# Patient Record
Sex: MALE | Race: WHITE | Employment: FULL TIME | URBAN - NONMETROPOLITAN AREA
[De-identification: names, ages, dates, MRNs, and addresses within clinical notes are randomized per-mention and may not be internally consistent; named-entity substitution may affect disease eponyms.]

---

## 2017-07-20 ENCOUNTER — HOSPITAL ENCOUNTER (EMERGENCY)
Age: 32
Discharge: HOME | End: 2017-07-20
Attending: EMERGENCY MEDICINE

## 2017-07-20 VITALS
HEART RATE: 77 BPM | SYSTOLIC BLOOD PRESSURE: 99 MMHG | TEMPERATURE: 98.1 F | RESPIRATION RATE: 20 BRPM | DIASTOLIC BLOOD PRESSURE: 83 MMHG | OXYGEN SATURATION: 98 %

## 2017-07-20 DIAGNOSIS — K52.9 GASTROENTERITIS: ICD-10-CM

## 2017-07-20 DIAGNOSIS — R11.11 VOMITING WITHOUT NAUSEA, INTRACTABILITY OF VOMITING NOT SPECIFIED, UNSPECIFIED VOMITING TYPE: Primary | ICD-10-CM

## 2017-07-20 DIAGNOSIS — R19.7 DIARRHEA, UNSPECIFIED TYPE: ICD-10-CM

## 2017-07-20 PROCEDURE — 6360000002 HC RX W HCPCS: Performed by: EMERGENCY MEDICINE

## 2017-07-20 PROCEDURE — 99283 EMERGENCY DEPT VISIT LOW MDM: CPT

## 2017-07-20 RX ORDER — ONDANSETRON 4 MG/1
4 TABLET, ORALLY DISINTEGRATING ORAL ONCE
Status: COMPLETED | OUTPATIENT
Start: 2017-07-20 | End: 2017-07-20

## 2017-07-20 RX ORDER — ONDANSETRON 4 MG/1
4 TABLET, ORALLY DISINTEGRATING ORAL EVERY 8 HOURS PRN
Qty: 4 TABLET | Refills: 0 | Status: SHIPPED | OUTPATIENT
Start: 2017-07-20 | End: 2017-12-30 | Stop reason: ALTCHOICE

## 2017-07-20 RX ADMIN — ONDANSETRON 4 MG: 4 TABLET, ORALLY DISINTEGRATING ORAL at 08:42

## 2017-12-30 ENCOUNTER — HOSPITAL ENCOUNTER (EMERGENCY)
Age: 32
Discharge: HOME | End: 2017-12-30
Attending: FAMILY MEDICINE

## 2017-12-30 VITALS
SYSTOLIC BLOOD PRESSURE: 132 MMHG | OXYGEN SATURATION: 98 % | HEART RATE: 79 BPM | TEMPERATURE: 98.8 F | DIASTOLIC BLOOD PRESSURE: 93 MMHG | WEIGHT: 300 LBS | RESPIRATION RATE: 18 BRPM

## 2017-12-30 DIAGNOSIS — H66.002 ACUTE SUPPURATIVE OTITIS MEDIA OF LEFT EAR WITHOUT SPONTANEOUS RUPTURE OF TYMPANIC MEMBRANE, RECURRENCE NOT SPECIFIED: Primary | ICD-10-CM

## 2017-12-30 PROCEDURE — 6370000000 HC RX 637 (ALT 250 FOR IP): Performed by: FAMILY MEDICINE

## 2017-12-30 PROCEDURE — 99282 EMERGENCY DEPT VISIT SF MDM: CPT

## 2017-12-30 RX ORDER — AMOXICILLIN AND CLAVULANATE POTASSIUM 875; 125 MG/1; MG/1
1 TABLET, FILM COATED ORAL ONCE
Status: COMPLETED | OUTPATIENT
Start: 2017-12-30 | End: 2017-12-30

## 2017-12-30 RX ORDER — AMOXICILLIN 500 MG/1
1000 CAPSULE ORAL 3 TIMES DAILY
Qty: 60 CAPSULE | Refills: 0 | Status: SHIPPED | OUTPATIENT
Start: 2017-12-30 | End: 2018-01-09

## 2017-12-30 RX ADMIN — AMOXICILLIN AND CLAVULANATE POTASSIUM 1 TABLET: 875; 125 TABLET, FILM COATED ORAL at 01:53

## 2017-12-30 ASSESSMENT — PAIN DESCRIPTION - ORIENTATION: ORIENTATION: LEFT

## 2017-12-30 ASSESSMENT — PAIN DESCRIPTION - ONSET: ONSET: AWAKENED FROM SLEEP

## 2017-12-30 ASSESSMENT — PAIN DESCRIPTION - DESCRIPTORS: DESCRIPTORS: THROBBING

## 2017-12-30 ASSESSMENT — PAIN DESCRIPTION - PROGRESSION: CLINICAL_PROGRESSION: NOT CHANGED

## 2017-12-30 ASSESSMENT — PAIN SCALES - GENERAL: PAINLEVEL_OUTOF10: 6

## 2017-12-30 ASSESSMENT — PAIN DESCRIPTION - LOCATION: LOCATION: EAR

## 2017-12-30 ASSESSMENT — PAIN DESCRIPTION - FREQUENCY: FREQUENCY: CONTINUOUS

## 2017-12-30 NOTE — ED PROVIDER NOTES
[address]  eMERGENCY dEPARTMENT eNCOUnter          [address] [facility]       Chief Complaint   Patient presents with   Jesi Cobos had a cold for about a week with sinus pain. Tonight around 10:30 I woke up from a nap with left ear pain and noticed blood. Felt dizzy now\"       Nurses Notes reviewed and I agree except as noted in the HPI. HISTORY OF PRESENT ILLNESS    Terrie Zabala is a 28 y.o. male who presents To emergency room with complaint of pain in his left ear, patient states he's had cold with some sinus discomfort over the past week, beginning dosing increasing left ear pain, did try to take a Q-tip and clears wax out me and noticing blood. Patient states he has had ear infections in both ears in the past.  Patient rates his pain 6 out of 10, throbbing continuous. REVIEW OF SYSTEMS     Review of Systems   All other systems reviewed and are negative. PAST MEDICAL HISTORY    has no past medical history on file. SURGICAL HISTORY      has a past surgical history that includes Umbilical hernia repair; Tympanostomy tube placement; and Tympanostomy tube placement. CURRENT MEDICATIONS       Previous Medications    No medications on file       ALLERGIES     has No Known Allergies. FAMILY HISTORY     has no family status information on file. family history is not on file. SOCIAL HISTORY      reports that he has been smoking Cigarettes. He has been smoking about 0.50 packs per day. He does not have any smokeless tobacco history on file. He reports that he does not drink alcohol. PHYSICAL EXAM     INITIAL VITALS:  weight is 300 lb (136.1 kg). His oral temperature is 98.8 °F (37.1 °C). His blood pressure is 132/93 (abnormal) and his pulse is 79. His respiration is 18 and oxygen saturation is 98%. Physical Exam   Constitutional: Patient is oriented to person, place, and time. Patient appears well-developed and well-nourished.  Patient is active and cooperative. HENT:   Head: Normocephalic and atraumatic. Head is without contusion. Right Ear: Hearing and external ear normal. No drainage. Normal TM   Left Ear: Hearing and external ear normal. No drainage. Noted opaque TM with bulging, noted gelled blood external to the tympanic membrane consistent with small scrape noted on the anterior aspect of the ear canal  Nose: Nose normal. No nasal deformity. No epistaxis. Mouth/Throat: Mucous membranes are not dry. Mild erythema without exudate  Eyes: EOMI. Conjunctivae, sclera, and lids are normal. Right eye exhibits no discharge. Left eye exhibits no discharge. Neck: Full passive range of motion without pain and phonation normal.   Cardiovascular:  Normal rate, regular rhythm and intact distal pulses. Pulses: Right radial pulse  2+   Pulmonary/Chest: Effort normal. No tachypnea and no bradypnea. No wheezes, rhonchi, or rales. Abdominal: Soft. Increased BMI   Musculoskeletal:   Negative acute trauma or deformity,  apparent full range of motion and normal strength all extremities appropriate to age. Neurological: Patient is alert and oriented to person, place, and time. patient displays no tremor. Patient displays no seizure activity. .  Lymphatic:  Mild anterior cervical lymphadenopathy  Skin: Skin is warm and dry. Patient is not diaphoretic. Psychiatric: Patient has a normal mood and affect.  Patient speech is normal and behavior is normal. Cognition and memory are normal.      DIFFERENTIAL DIAGNOSIS:   OM, Effusion, ruptured eardrum    DIAGNOSTIC RESULTS           RADIOLOGY: non-plain film images(s) such as CT, Ultrasound and MRI are read by the radiologist.  No orders to display       LABS:   Labs Reviewed - No data to display    EMERGENCY DEPARTMENT COURSE:   Vitals:    Vitals:    12/30/17 0116   BP: (!) 132/93   Pulse: 79   Resp: 18   Temp: 98.8 °F (37.1 °C)   TempSrc: Oral   SpO2: 98%   Weight: 300 lb (136.1 kg)     Patient history physical

## 2018-06-29 ENCOUNTER — APPOINTMENT (OUTPATIENT)
Dept: GENERAL RADIOLOGY | Age: 33
End: 2018-06-29

## 2018-06-29 ENCOUNTER — HOSPITAL ENCOUNTER (EMERGENCY)
Age: 33
Discharge: HOME | End: 2018-06-29
Attending: EMERGENCY MEDICINE

## 2018-06-29 VITALS
SYSTOLIC BLOOD PRESSURE: 122 MMHG | HEART RATE: 82 BPM | OXYGEN SATURATION: 97 % | RESPIRATION RATE: 26 BRPM | TEMPERATURE: 98.6 F | DIASTOLIC BLOOD PRESSURE: 65 MMHG

## 2018-06-29 DIAGNOSIS — M54.12 CERVICAL RADICULOPATHY: ICD-10-CM

## 2018-06-29 DIAGNOSIS — M54.2 NECK PAIN: Primary | ICD-10-CM

## 2018-06-29 LAB
ABSOLUTE BANDS #: 0.41 K/UL (ref 0–1)
ABSOLUTE EOS #: 0.14 K/UL (ref 0–0.4)
ABSOLUTE IMMATURE GRANULOCYTE: ABNORMAL K/UL (ref 0–0.3)
ABSOLUTE LYMPH #: 4.9 K/UL (ref 1–4.8)
ABSOLUTE MONO #: 0.95 K/UL (ref 0–1)
ANION GAP SERPL CALCULATED.3IONS-SCNC: 12 MMOL/L (ref 9–17)
ATYPICAL LYMPHOCYTE ABSOLUTE COUNT: 0.14 K/UL (ref 0–1)
ATYPICAL LYMPHOCYTES: 1 %
BANDS: 3 % (ref 0–10)
BASOPHILS # BLD: ABNORMAL % (ref 0–2)
BASOPHILS ABSOLUTE: ABNORMAL K/UL (ref 0–0.2)
BUN BLDV-MCNC: 20 MG/DL (ref 6–20)
BUN/CREAT BLD: 24 (ref 9–20)
CALCIUM SERPL-MCNC: 9.5 MG/DL (ref 8.6–10.4)
CHLORIDE BLD-SCNC: 100 MMOL/L (ref 98–107)
CO2: 27 MMOL/L (ref 20–31)
CREAT SERPL-MCNC: 0.85 MG/DL (ref 0.7–1.2)
DIFFERENTIAL TYPE: ABNORMAL
EKG ATRIAL RATE: 77 BPM
EKG P AXIS: 18 DEGREES
EKG P-R INTERVAL: 146 MS
EKG Q-T INTERVAL: 388 MS
EKG QRS DURATION: 80 MS
EKG QTC CALCULATION (BAZETT): 439 MS
EKG R AXIS: -9 DEGREES
EKG T AXIS: 36 DEGREES
EKG VENTRICULAR RATE: 77 BPM
EOSINOPHILS RELATIVE PERCENT: 1 % (ref 0–5)
GFR AFRICAN AMERICAN: >60 ML/MIN
GFR NON-AFRICAN AMERICAN: >60 ML/MIN
GFR SERPL CREATININE-BSD FRML MDRD: ABNORMAL ML/MIN/{1.73_M2}
GFR SERPL CREATININE-BSD FRML MDRD: ABNORMAL ML/MIN/{1.73_M2}
GLUCOSE BLD-MCNC: 148 MG/DL (ref 70–99)
HCT VFR BLD CALC: 43.6 % (ref 41–53)
HEMOGLOBIN: 15.1 G/DL (ref 13.5–17.5)
IMMATURE GRANULOCYTES: ABNORMAL %
LYMPHOCYTES # BLD: 36 % (ref 13–44)
MCH RBC QN AUTO: 32 PG (ref 26–34)
MCHC RBC AUTO-ENTMCNC: 34.7 G/DL (ref 31–37)
MCV RBC AUTO: 92.1 FL (ref 80–100)
MONOCYTES # BLD: 7 % (ref 5–9)
MORPHOLOGY: ABNORMAL
NRBC AUTOMATED: ABNORMAL PER 100 WBC
PDW BLD-RTO: 13.7 % (ref 12.1–15.2)
PLATELET # BLD: 209 K/UL (ref 140–450)
PLATELET ESTIMATE: ABNORMAL
PMV BLD AUTO: ABNORMAL FL (ref 6–12)
POTASSIUM SERPL-SCNC: 4 MMOL/L (ref 3.7–5.3)
RBC # BLD: 4.73 M/UL (ref 4.5–5.9)
RBC # BLD: ABNORMAL 10*6/UL
SEG NEUTROPHILS: 52 % (ref 39–75)
SEGMENTED NEUTROPHILS ABSOLUTE COUNT: 7.06 K/UL (ref 2.1–6.5)
SODIUM BLD-SCNC: 139 MMOL/L (ref 135–144)
TROPONIN INTERP: NORMAL
TROPONIN T: <0.03 NG/ML
WBC # BLD: 13.6 K/UL (ref 3.5–11)
WBC # BLD: ABNORMAL 10*3/UL

## 2018-06-29 PROCEDURE — 93005 ELECTROCARDIOGRAM TRACING: CPT

## 2018-06-29 PROCEDURE — 99283 EMERGENCY DEPT VISIT LOW MDM: CPT

## 2018-06-29 PROCEDURE — 85025 COMPLETE CBC W/AUTO DIFF WBC: CPT

## 2018-06-29 PROCEDURE — 96374 THER/PROPH/DIAG INJ IV PUSH: CPT

## 2018-06-29 PROCEDURE — 36415 COLL VENOUS BLD VENIPUNCTURE: CPT

## 2018-06-29 PROCEDURE — 71046 X-RAY EXAM CHEST 2 VIEWS: CPT

## 2018-06-29 PROCEDURE — 80048 BASIC METABOLIC PNL TOTAL CA: CPT

## 2018-06-29 PROCEDURE — 6360000002 HC RX W HCPCS: Performed by: EMERGENCY MEDICINE

## 2018-06-29 PROCEDURE — 84484 ASSAY OF TROPONIN QUANT: CPT

## 2018-06-29 RX ORDER — KETOROLAC TROMETHAMINE 30 MG/ML
15 INJECTION, SOLUTION INTRAMUSCULAR; INTRAVENOUS ONCE
Status: COMPLETED | OUTPATIENT
Start: 2018-06-29 | End: 2018-06-29

## 2018-06-29 RX ADMIN — KETOROLAC TROMETHAMINE 15 MG: 30 INJECTION, SOLUTION INTRAMUSCULAR at 00:55

## 2018-06-29 ASSESSMENT — ENCOUNTER SYMPTOMS
SHORTNESS OF BREATH: 0
EYE REDNESS: 0
DIARRHEA: 0
SORE THROAT: 0
ABDOMINAL PAIN: 0
TROUBLE SWALLOWING: 0
BACK PAIN: 0
NAUSEA: 0
EYE PAIN: 0
COUGH: 0
COLOR CHANGE: 0
VOMITING: 0

## 2018-06-29 ASSESSMENT — PAIN SCALES - GENERAL
PAINLEVEL_OUTOF10: 4
PAINLEVEL_OUTOF10: 4

## 2018-06-29 ASSESSMENT — PAIN DESCRIPTION - PAIN TYPE: TYPE: ACUTE PAIN

## 2018-06-29 ASSESSMENT — PAIN DESCRIPTION - ORIENTATION: ORIENTATION: RIGHT;LEFT

## 2018-06-29 ASSESSMENT — PAIN DESCRIPTION - FREQUENCY: FREQUENCY: CONTINUOUS

## 2018-06-29 ASSESSMENT — PAIN DESCRIPTION - DESCRIPTORS: DESCRIPTORS: SHARP;SHOOTING

## 2018-06-29 ASSESSMENT — PAIN DESCRIPTION - LOCATION: LOCATION: ARM

## 2018-06-29 NOTE — ED PROVIDER NOTES
[facility] ED  eMERGENCY dEPARTMENT eNCOUnter      Pt Name: Nickolas Chavira  MRN: [id number]  [place] 1985  Date of evaluation: 6/29/2018  Provider: [place] MD    CHIEF COMPLAINT       Chief Complaint   Patient presents with    Arm Pain     pt c/o bilateral arm pain with numbness, lightheaded and dizziness     Patient is a 77-year-old male presents to the emergency department complaining of bilateral arm pain. He states he developed pain across his back and neck with numbness and pain radiating down his arms. He denies any chest pain. He denies any shortness of breath. He denies any nausea or vomiting. He states that the symptoms were bad for 30 minutes and have gotten better but are still present. He states it hurts when he moves his arms. He denies any numbness or tingling. He denies any history of any medical problems and denies any family history of medical problems. Nursing Notes were reviewed. REVIEW OF SYSTEMS    (2-9 systems for level 4, 10 or more for level 5)     Review of Systems   Constitutional: Negative for chills and fever. HENT: Negative for ear pain, sore throat and trouble swallowing. Eyes: Negative for pain and redness. Respiratory: Negative for cough and shortness of breath. Cardiovascular: Negative for chest pain and palpitations. Gastrointestinal: Negative for abdominal pain, diarrhea, nausea and vomiting. Genitourinary: Negative for dysuria and frequency. Musculoskeletal: Negative for back pain and neck pain. Skin: Negative for color change and rash. Neurological: Positive for weakness and numbness. Negative for dizziness, syncope and headaches. Psychiatric/Behavioral: Negative for hallucinations and suicidal ideas. Except as noted above the remainder of the review of systems was reviewed and negative. PAST MEDICAL HISTORY   History reviewed. No pertinent past medical history.       SURGICAL HISTORY       Past Surgical History:   Procedure Laterality Date    TYMPANOSTOMY TUBE PLACEMENT      TYMPANOSTOMY TUBE PLACEMENT      UMBILICAL HERNIA REPAIR           ALLERGIES     Patient has no known allergies. FAMILY HISTORY     History reviewed. No pertinent family history. SOCIAL HISTORY       Social History     Social History    Marital status:      Spouse name: N/A    Number of children: N/A    Years of education: N/A     Social History Main Topics    Smoking status: Current Every Day Smoker     Packs/day: 0.50     Types: Cigarettes    Smokeless tobacco: Never Used    Alcohol use No    Drug use: Unknown    Sexual activity: Not Asked     Other Topics Concern    None     Social History Narrative    None           PHYSICAL EXAM    (up to 7 for level 4, 8 or more for level 5)     ED Triage Vitals [06/29/18 0028]   BP Temp Temp Source Pulse Resp SpO2 Height Weight   (!) 144/86 98.6 °F (37 °C) Oral 79 12 99 % -- --       Physical Exam     Physical    Vital signs and nursing notes were reviewed as well as the social, family, and past medical history. Gen. appearance: Patient is alert and oriented and in no acute distress    Head: Atraumatic, normocephalic    Neck: Supple, trachea/thyroid normal    EENT: PERRLA, EOMI, conjunctiva normal.    Skin: Warm and dry with no rash    Cardiovascular: Heart RRR, no gallops or rubs, no aortic enlargement or bruits noted. Respiratory: Lungs clear, no wheezing, no rales, normal breath sounds. Gastrointestinal: Abdomen nontender, bowel sounds normal, no rebound/guarding/distention or mass    Musculoskeletal: No tenderness in the extremities, no back or hip pain.     Neurological: Patient is alert and oriented ×3, no focal motor or sensory deficits noted, reflexes normal, NIH = 0      DIAGNOSTIC RESULTS     EKG: All EKG's are interpreted by the Emergency Department Physician who either signs or Co-signs this chart in the absence of a cardiologist.    EKG: normal EKG,

## 2018-06-29 NOTE — LETTER
[facility] ED  [address] [address]  Phone: [phone]               June 29, 2018    Patient: Kojo Jesus   YOB: 1985   Date of Visit: 6/29/2018       To Whom It May Concern:    Kojo Jesus was seen and treated in our emergency department on 6/29/2018.  He may return to work on 6/30/18      Sincerely,       Varsha Grissom RN         Signature:__________________________________

## 2018-11-12 ENCOUNTER — OFFICE VISIT (OUTPATIENT)
Dept: PRIMARY CARE CLINIC | Age: 33
End: 2018-11-12
Payer: COMMERCIAL

## 2018-11-12 VITALS
HEART RATE: 66 BPM | TEMPERATURE: 98.4 F | OXYGEN SATURATION: 98 % | WEIGHT: 296 LBS | SYSTOLIC BLOOD PRESSURE: 125 MMHG | DIASTOLIC BLOOD PRESSURE: 86 MMHG

## 2018-11-12 DIAGNOSIS — J02.0 STREP PHARYNGITIS: Primary | ICD-10-CM

## 2018-11-12 DIAGNOSIS — J02.9 SORE THROAT: ICD-10-CM

## 2018-11-12 LAB — S PYO AG THROAT QL: POSITIVE

## 2018-11-12 PROCEDURE — 99213 OFFICE O/P EST LOW 20 MIN: CPT | Performed by: NURSE PRACTITIONER

## 2018-11-12 PROCEDURE — 87880 STREP A ASSAY W/OPTIC: CPT | Performed by: NURSE PRACTITIONER

## 2018-11-12 RX ORDER — VARENICLINE TARTRATE
KIT
Refills: 0 | COMMUNITY
Start: 2018-10-26 | End: 2019-04-13 | Stop reason: ALTCHOICE

## 2018-11-12 RX ORDER — LURASIDONE HYDROCHLORIDE 20 MG/1
TABLET, FILM COATED ORAL
Refills: 0 | COMMUNITY
Start: 2018-10-25 | End: 2019-09-01

## 2018-11-12 RX ORDER — PENICILLIN V POTASSIUM 500 MG/1
500 TABLET ORAL 3 TIMES DAILY
Qty: 30 TABLET | Refills: 0 | Status: SHIPPED | OUTPATIENT
Start: 2018-11-12 | End: 2018-11-22

## 2018-11-12 ASSESSMENT — ENCOUNTER SYMPTOMS
NAUSEA: 1
VOMITING: 0
SHORTNESS OF BREATH: 0
COUGH: 1
ALLERGIC/IMMUNOLOGIC NEGATIVE: 1
EYES NEGATIVE: 1
SORE THROAT: 1
ABDOMINAL PAIN: 0
VOICE CHANGE: 1

## 2018-11-12 NOTE — PATIENT INSTRUCTIONS
SURVEY:    You may be receiving a survey from [employer] regarding your visit today. Please complete the survey to enable us to provide the highest quality of care to you and your family. If you cannot score us as very good on any question, please call the office to discuss how we could have made your experience exceptional.     Thank you. Patient Education        Sore Throat: Care Instructions  Your Care Instructions    Infection by bacteria or a virus causes most sore throats. Cigarette smoke, dry air, air pollution, allergies, and yelling can also cause a sore throat. Sore throats can be painful and annoying. Fortunately, most sore throats go away on their own. If you have a bacterial infection, your doctor may prescribe antibiotics. Follow-up care is a key part of your treatment and safety. Be sure to make and go to all appointments, and call your doctor if you are having problems. It's also a good idea to know your test results and keep a list of the medicines you take. How can you care for yourself at home? · If your doctor prescribed antibiotics, take them as directed. Do not stop taking them just because you feel better. You need to take the full course of antibiotics. · Gargle with warm salt water once an hour to help reduce swelling and relieve discomfort. Use 1 teaspoon of salt mixed in 1 cup of warm water. · Take an over-the-counter pain medicine, such as acetaminophen (Tylenol), ibuprofen (Advil, Motrin), or naproxen (Aleve). Read and follow all instructions on the label. · Be careful when taking over-the-counter cold or flu medicines and Tylenol at the same time. Many of these medicines have acetaminophen, which is Tylenol. Read the labels to make sure that you are not taking more than the recommended dose. Too much acetaminophen (Tylenol) can be harmful. · Drink plenty of fluids. Fluids may help soothe an irritated throat.  Hot fluids, such as tea or soup, may help decrease throat make and go to all appointments, and call your doctor if you are having problems. It's also a good idea to know your test results and keep a list of the medicines you take. How can you care for yourself at home? · Take your antibiotics as directed. Do not stop taking them just because you feel better. You need to take the full course of antibiotics. · Strep throat can spread to others until 24 hours after you begin taking antibiotics. During this time, you should avoid contact with other people at work or home, especially infants and children. Do not sneeze or cough on others, and wash your hands often. Keep your drinking glass and eating utensils separate from those of others, and wash these items well in hot, soapy water. · Gargle with warm salt water at least once each hour to help reduce swelling and make your throat feel better. Use 1 teaspoon of salt mixed in 8 fluid ounces of warm water. · Take an over-the-counter pain medication, such as acetaminophen (Tylenol), ibuprofen (Advil, Motrin), or naproxen (Aleve). Read and follow all instructions on the label. · Try an over-the-counter anesthetic throat spray or throat lozenges, which may help relieve throat pain. · Drink plenty of fluids. Fluids may help soothe an irritated throat. Hot fluids, such as tea or soup, may help your throat feel better. · Eat soft solids and drink plenty of clear liquids. Flavored ice pops, ice cream, scrambled eggs, sherbet, and gelatin dessert (such as Jell-O) may also soothe the throat. · Get lots of rest.  · Do not smoke, and avoid secondhand smoke. If you need help quitting, talk to your doctor about stop-smoking programs and medicines. These can increase your chances of quitting for good. · Use a vaporizer or humidifier to add moisture to the air in your bedroom. Follow the directions for cleaning the machine. When should you call for help?   Call your doctor now or seek immediate medical care if:    · You have a new or higher fever.     · You have a fever with a stiff neck or severe headache.     · You have new or worse trouble swallowing.     · Your sore throat gets much worse on one side.     · Your pain becomes much worse on one side of your throat.    Watch closely for changes in your health, and be sure to contact your doctor if:    · You are not getting better after 2 days (48 hours).     · You do not get better as expected. Where can you learn more? Go to https://[employer]. org and sign in to your [employer] account. Enter K625 in the "[employer]" box to learn more about \"Strep Throat: Care Instructions. \"     If you do not have an account, please click on the \"Sign Up Now\" link. Current as of: March 28, 2018  Content Version: 11.8  © 1965-1614 [employer]. Care instructions adapted under license by [facility] ([facility]). If you have questions about a medical condition or this instruction, always ask your healthcare professional. [address] any warranty or liability for your use of this information.

## 2018-11-12 NOTE — PROGRESS NOTES
[address] [facility] WALK-IN [facility] [address] [id number]  Dept: [phone]  Dept Fax: [phone]    Preethi Gauthier is a 35 y.o. male who presents to the [address] [place] [address] in Care today for his medical conditions/complaints as noted below. Preethi Gauthier is c/o of Pharyngitis (patient presents today for sore throat and BUK ear pain with fever since last night )      HPI:    Preethi Gauthier is a 35 y.o. male who presents with  Pharyngitis   This is a new problem. The current episode started yesterday (Late last night sore throat started. ). The problem occurs constantly. The problem has been unchanged. Associated symptoms include chills, coughing (at times ), diaphoresis, fatigue, a fever (102.9 temp this morning.), nausea, neck pain (Bilateral) and a sore throat. Pertinent negatives include no abdominal pain, congestion, headaches, rash or vomiting. The symptoms are aggravated by drinking, swallowing and eating. He has tried nothing for the symptoms. No past medical history on file. Current Outpatient Prescriptions   Medication Sig Dispense Refill    LATUDA 20 MG TABS tablet take 1 tablet by mouth daily  0    CHANTIX STARTING MONTH QUYEN 0.5 MG X 11 & 1 MG X 42 tablet   0    penicillin v potassium (VEETID) 500 MG tablet Take 1 tablet by mouth 3 times daily for 10 days 30 tablet 0     No current facility-administered medications for this visit. No Known Allergies    Subjective:      Review of Systems   Constitutional: Positive for chills, diaphoresis, fatigue and fever (102.9 temp this morning.). HENT: Positive for ear pain (Bilateral ear pain), sore throat and voice change (hoarse). Negative for congestion. Eyes: Negative. Respiratory: Positive for cough (at times ). Negative for shortness of breath. Cardiovascular: Negative. Gastrointestinal: Positive for nausea. Negative for abdominal pain and vomiting.    Endocrine: antibiotic  · Tylenol/Ibuprofen OTC PRN as directed on package for pain, discomfort or fever  · Encouraged to increase fluids and rest  · Avoid salty, spicy or acidic foods or beverages  · Soft diet until sore throat subsides  · Warm salt water gargles for sore throat  · Cepacol lozenges, chloraseptic spray OTC q 1-2 hrs PRN for sore throat  · Patient instructions given for strep pharyngitis  · To ER or call 911 if any difficulty breathing, shortness of breath, inability to swallow, hives, rash, facial/tongue swelling or temp greater than 103 degrees. · Follow up with PCP or Walk in Care as needed if symptoms worsen or do not improve. Return if symptoms worsen or fail to improve.     Orders Placed This Encounter   Medications    penicillin v potassium (VEETID) 500 MG tablet     Sig: Take 1 tablet by mouth 3 times daily for 10 days     Dispense:  30 tablet     Refill:  0        Electronically signed TOMMIE Calderon CNP on 11/12/2018 at 3:59 PM

## 2019-04-13 ENCOUNTER — OFFICE VISIT (OUTPATIENT)
Dept: PRIMARY CARE CLINIC | Age: 34
End: 2019-04-13
Payer: COMMERCIAL

## 2019-04-13 ENCOUNTER — HOSPITAL ENCOUNTER (OUTPATIENT)
Age: 34
Setting detail: SPECIMEN
Discharge: HOME | End: 2019-04-13
Payer: COMMERCIAL

## 2019-04-13 VITALS
OXYGEN SATURATION: 96 % | WEIGHT: 311 LBS | TEMPERATURE: 98.1 F | DIASTOLIC BLOOD PRESSURE: 80 MMHG | HEART RATE: 74 BPM | SYSTOLIC BLOOD PRESSURE: 126 MMHG

## 2019-04-13 DIAGNOSIS — J03.80 ACUTE BACTERIAL TONSILLITIS: ICD-10-CM

## 2019-04-13 DIAGNOSIS — J02.9 PHARYNGITIS, UNSPECIFIED ETIOLOGY: ICD-10-CM

## 2019-04-13 DIAGNOSIS — J01.00 ACUTE MAXILLARY SINUSITIS, RECURRENCE NOT SPECIFIED: ICD-10-CM

## 2019-04-13 DIAGNOSIS — R50.9 FEVER, UNSPECIFIED FEVER CAUSE: ICD-10-CM

## 2019-04-13 DIAGNOSIS — B96.89 ACUTE BACTERIAL TONSILLITIS: ICD-10-CM

## 2019-04-13 DIAGNOSIS — H66.002 ACUTE SUPPURATIVE OTITIS MEDIA OF LEFT EAR WITHOUT SPONTANEOUS RUPTURE OF TYMPANIC MEMBRANE, RECURRENCE NOT SPECIFIED: Primary | ICD-10-CM

## 2019-04-13 LAB
INFLUENZA A ANTIBODY: NORMAL
INFLUENZA B ANTIBODY: NORMAL
S PYO AG THROAT QL: NORMAL

## 2019-04-13 PROCEDURE — 87804 INFLUENZA ASSAY W/OPTIC: CPT | Performed by: NURSE PRACTITIONER

## 2019-04-13 PROCEDURE — 4004F PT TOBACCO SCREEN RCVD TLK: CPT | Performed by: NURSE PRACTITIONER

## 2019-04-13 PROCEDURE — G8421 BMI NOT CALCULATED: HCPCS | Performed by: NURSE PRACTITIONER

## 2019-04-13 PROCEDURE — G8427 DOCREV CUR MEDS BY ELIG CLIN: HCPCS | Performed by: NURSE PRACTITIONER

## 2019-04-13 PROCEDURE — 87651 STREP A DNA AMP PROBE: CPT

## 2019-04-13 PROCEDURE — 99214 OFFICE O/P EST MOD 30 MIN: CPT | Performed by: NURSE PRACTITIONER

## 2019-04-13 PROCEDURE — 87880 STREP A ASSAY W/OPTIC: CPT | Performed by: NURSE PRACTITIONER

## 2019-04-13 RX ORDER — AMOXICILLIN AND CLAVULANATE POTASSIUM 875; 125 MG/1; MG/1
1 TABLET, FILM COATED ORAL 2 TIMES DAILY
Qty: 20 TABLET | Refills: 0 | Status: SHIPPED | OUTPATIENT
Start: 2019-04-13 | End: 2019-04-23

## 2019-04-13 RX ORDER — BENZONATATE 100 MG/1
100-200 CAPSULE ORAL 3 TIMES DAILY PRN
Qty: 60 CAPSULE | Refills: 0 | Status: SHIPPED | OUTPATIENT
Start: 2019-04-13 | End: 2019-04-20

## 2019-04-13 ASSESSMENT — ENCOUNTER SYMPTOMS
NAUSEA: 0
SINUS PAIN: 1
VOMITING: 1
SORE THROAT: 1
SHORTNESS OF BREATH: 0
SINUS PRESSURE: 1
RHINORRHEA: 1
COUGH: 1
WHEEZING: 0
ALLERGIC/IMMUNOLOGIC NEGATIVE: 1
EYES NEGATIVE: 1

## 2019-04-13 NOTE — PATIENT INSTRUCTIONS
SURVEY:    You may be receiving a survey from [employer] regarding your visit today. Please complete the survey to enable us to provide the highest quality of care to you and your family. If you cannot score us a very good on any question, please call the office to discuss how we could have made your experience a very good one. Thank you. Patient Education        Ear Infection (Otitis Media): Care Instructions  Your Care Instructions    An ear infection may start with a cold and affect the middle ear (otitis media). It can hurt a lot. Most ear infections clear up on their own in a couple of days. Most often you will not need antibiotics. This is because many ear infections are caused by a virus. Antibiotics don't work against a virus. Regular doses of pain medicines are the best way to reduce your fever and help you feel better. Follow-up care is a key part of your treatment and safety. Be sure to make and go to all appointments, and call your doctor if you are having problems. It's also a good idea to know your test results and keep a list of the medicines you take. How can you care for yourself at home? · Take pain medicines exactly as directed. ? If the doctor gave you a prescription medicine for pain, take it as prescribed. ? If you are not taking a prescription pain medicine, take an over-the-counter medicine, such as acetaminophen (Tylenol), ibuprofen (Advil, Motrin), or naproxen (Aleve). Read and follow all instructions on the label. ? Do not take two or more pain medicines at the same time unless the doctor told you to. Many pain medicines have acetaminophen, which is Tylenol. Too much acetaminophen (Tylenol) can be harmful. · Plan to take a full dose of pain reliever before bedtime. Getting enough sleep will help you get better. · Try a warm, moist washcloth on the ear. It may help relieve pain. · If your doctor prescribed antibiotics, take them as directed.  Do not stop taking them just because you feel better. You need to take the full course of antibiotics. When should you call for help? Call your doctor now or seek immediate medical care if:    · You have new or increasing ear pain.     · You have new or increasing pus or blood draining from your ear.     · You have a fever with a stiff neck or a severe headache.    Watch closely for changes in your health, and be sure to contact your doctor if:    · You have new or worse symptoms.     · You are not getting better after taking an antibiotic for 2 days. Where can you learn more? Go to https://Powered Nowpepiceweb.DataCore Software. org and sign in to your WillCall account. Enter F445 in the TrendKite box to learn more about \"Ear Infection (Otitis Media): Care Instructions. \"     If you do not have an account, please click on the \"Sign Up Now\" link. Current as of: March 27, 2018  Content Version: 11.9  © 1756-9541 [employer] K[employer]. Care instructions adapted under license by [facility] ([facility]). If you have questions about a medical condition or this instruction, always ask your healthcare professional. [place] [address] any warranty or liability for your use of this information. Patient Education        Sore Throat: Care Instructions  Your Care Instructions    Infection by bacteria or a virus causes most sore throats. Cigarette smoke, dry air, air pollution, allergies, and yelling can also cause a sore throat. Sore throats can be painful and annoying. Fortunately, most sore throats go away on their own. If you have a bacterial infection, your doctor may prescribe antibiotics. Follow-up care is a key part of your treatment and safety. Be sure to make and go to all appointments, and call your doctor if you are having problems. It's also a good idea to know your test results and keep a list of the medicines you take. How can you care for yourself at home?   · If your doctor prescribed antibiotics, take them as directed. Do not stop taking them just because you feel better. You need to take the full course of antibiotics. · Gargle with warm salt water once an hour to help reduce swelling and relieve discomfort. Use 1 teaspoon of salt mixed in 1 cup of warm water. · Take an over-the-counter pain medicine, such as acetaminophen (Tylenol), ibuprofen (Advil, Motrin), or naproxen (Aleve). Read and follow all instructions on the label. · Be careful when taking over-the-counter cold or flu medicines and Tylenol at the same time. Many of these medicines have acetaminophen, which is Tylenol. Read the labels to make sure that you are not taking more than the recommended dose. Too much acetaminophen (Tylenol) can be harmful. · Drink plenty of fluids. Fluids may help soothe an irritated throat. Hot fluids, such as tea or soup, may help decrease throat pain. · Use over-the-counter throat lozenges to soothe pain. Regular cough drops or hard candy may also help. These should not be given to young children because of the risk of choking. · Do not smoke or allow others to smoke around you. If you need help quitting, talk to your doctor about stop-smoking programs and medicines. These can increase your chances of quitting for good. · Use a vaporizer or humidifier to add moisture to your bedroom. Follow the directions for cleaning the machine. When should you call for help? Call your doctor now or seek immediate medical care if:    · You have new or worse trouble swallowing.     · Your sore throat gets much worse on one side.    Watch closely for changes in your health, and be sure to contact your doctor if you do not get better as expected. Where can you learn more? Go to https://Pulsegema.Share Some Style. org and sign in to your Deligic account. Enter T124 in the BizGreet box to learn more about \"Sore Throat: Care Instructions. \"     If you do not have an account, please click on the \"Sign Up Now\" link.   Current as of: March 27, 2018  Content Version: 11.9  © 0360-2366 SafetyPay. Care instructions adapted under license by [facility] ([facility]). If you have questions about a medical condition or this instruction, always ask your healthcare professional. [address] any warranty or liability for your use of this information. Patient Education        Tonsillitis: Care Instructions  Your Care Instructions    Tonsillitis is an infection of the tonsils that is caused by bacteria or a virus. The tonsils are in the back of the throat and are part of the immune system. Tonsillitis typically lasts from a few days up to a couple of weeks. Tonsillitis caused by a virus goes away on its own. Tonsillitis caused by the bacteria that causes strep throat is treated with antibiotics. You and your doctor may consider surgery to remove the tonsils (tonsillectomy) if you have serious complications or repeat infections. Follow-up care is a key part of your treatment and safety. Be sure to make and go to all appointments, and call your doctor if you are having problems. It's also a good idea to know your test results and keep a list of the medicines you take. How can you care for yourself at home? · If your doctor prescribed antibiotics, take them as directed. Do not stop taking them just because you feel better. You need to take the full course of antibiotics. · Gargle with warm salt water. This helps reduce swelling and relieve discomfort. Gargle once an hour with 1 teaspoon of salt mixed in 8 fluid ounces of warm water. · Take an over-the-counter pain medicine, such as acetaminophen (Tylenol), ibuprofen (Advil, Motrin), or naproxen (Aleve). Be safe with medicines. Read and follow all instructions on the label. No one younger than 20 should take aspirin. It has been linked to Reye syndrome, a serious illness.   · Be careful when taking over-the-counter cold or flu medicines and Tylenol at the same time. Many of these medicines have acetaminophen, which is Tylenol. Read the labels to make sure that you are not taking more than the recommended dose. Too much acetaminophen (Tylenol) can be harmful. · Try an over-the-counter throat spray to relieve throat pain. · Drink plenty of fluids. Fluids may help soothe an irritated throat. Drink warm or cool liquids (whichever feels better). These include tea, soup, and juice. · Do not smoke, and avoid secondhand smoke. Smoking can make tonsillitis worse. If you need help quitting, talk to your doctor about stop-smoking programs and medicines. These can increase your chances of quitting for good. · Use a vaporizer or humidifier to add moisture to your bedroom. Follow the directions for cleaning the machine. When should you call for help? Call your doctor now or seek immediate medical care if:    · Your pain gets worse on one side of your throat.     · You have a new or higher fever.     · You notice changes in your voice.     · You have trouble opening your mouth.     · You have any trouble breathing.     · You have much more trouble swallowing.     · You have a fever with a stiff neck or a severe headache.     · You are sensitive to light or feel very sleepy or confused.    Watch closely for changes in your health, and be sure to contact your doctor if:    · You do not get better after 2 days. Where can you learn more? Go to https://GenometrycristelBluebox.Hypios. org and sign in to your Perceptis account. Enter E383 in the [facility] box to learn more about \"Tonsillitis: Care Instructions. \"     If you do not have an account, please click on the \"Sign Up Now\" link. Current as of: March 27, 2018  Content Version: 11.9  © 1457-2217 Mobileye, iovox. Care instructions adapted under license by [facility] ([facility]).  If you have questions about a medical condition or this instruction, always ask your healthcare professional. [place] [place] any warranty or liability for your use of this information. Patient Education        Sinusitis: Care Instructions  Your Care Instructions    Sinusitis is an infection of the lining of the sinus cavities in your head. Sinusitis often follows a cold. It causes pain and pressure in your head and face. In most cases, sinusitis gets better on its own in 1 to 2 weeks. But some mild symptoms may last for several weeks. Sometimes antibiotics are needed. Follow-up care is a key part of your treatment and safety. Be sure to make and go to all appointments, and call your doctor if you are having problems. It's also a good idea to know your test results and keep a list of the medicines you take. How can you care for yourself at home? · Take an over-the-counter pain medicine, such as acetaminophen (Tylenol), ibuprofen (Advil, Motrin), or naproxen (Aleve). Read and follow all instructions on the label. · If the doctor prescribed antibiotics, take them as directed. Do not stop taking them just because you feel better. You need to take the full course of antibiotics. · Be careful when taking over-the-counter cold or flu medicines and Tylenol at the same time. Many of these medicines have acetaminophen, which is Tylenol. Read the labels to make sure that you are not taking more than the recommended dose. Too much acetaminophen (Tylenol) can be harmful. · Breathe warm, moist air from a steamy shower, a hot bath, or a sink filled with hot water. Avoid cold, dry air. Using a humidifier in your home may help. Follow the directions for cleaning the machine. · Use saline (saltwater) nasal washes to help keep your nasal passages open and wash out mucus and bacteria. You can buy saline nose drops at a grocery store or drugstore. Or you can make your own at home by adding 1 teaspoon of salt and 1 teaspoon of baking soda to 2 cups of distilled water.  If you make your own, fill a bulb syringe with the solution, insert the tip

## 2019-04-13 NOTE — PROGRESS NOTES
Cardiovascular: Negative. Gastrointestinal: Positive for vomiting. Negative for nausea. Endocrine: Negative. Genitourinary: Negative. Musculoskeletal: Positive for myalgias (still has body aches). Skin: Negative. Allergic/Immunologic: Negative. Neurological: Positive for headaches. Hematological: Negative. Psychiatric/Behavioral: Negative. Objective:     Physical Exam   Constitutional: He is oriented to person, place, and time. He appears well-developed and well-nourished. HENT:   Head: Normocephalic and atraumatic. Right Ear: External ear normal.   Left Ear: External ear normal. No mastoid tenderness. Tympanic membrane is injected, erythematous and bulging. A middle ear effusion is present. Nose: Mucosal edema present. Right sinus exhibits frontal sinus tenderness. Left sinus exhibits frontal sinus tenderness. Mouth/Throat: Uvula is midline and mucous membranes are normal. Posterior oropharyngeal erythema present. Tonsils are 3+ on the right. Tonsils are 3+ on the left. No tonsillar exudate. Eyes: Pupils are equal, round, and reactive to light. EOM are normal.   Neck: Trachea normal and normal range of motion. Neck supple. Cardiovascular: Normal rate, regular rhythm and normal heart sounds. Pulmonary/Chest: Effort normal and breath sounds normal.   Abdominal: Soft. Bowel sounds are normal.   Musculoskeletal: Normal range of motion. Neurological: He is alert and oriented to person, place, and time. Skin: Skin is warm and dry. Capillary refill takes less than 2 seconds. Psychiatric: He has a normal mood and affect. Nursing note and vitals reviewed. /80   Pulse 74   Temp 98.1 °F (36.7 °C) (Oral)   Wt (!) 311 lb (141.1 kg)   SpO2 96%     Assessment:      Diagnosis Orders   1. Acute suppurative otitis media of left ear without spontaneous rupture of tympanic membrane, recurrence not specified     2.  Acute maxillary sinusitis, recurrence not specified 3. Pharyngitis, unspecified etiology  Strep A DNA probe, amplification    lidocaine viscous (XYLOCAINE) 2 % solution   4. Fever, unspecified fever cause  POCT rapid strep A    POCT Influenza A/B   5. Acute bacterial tonsillitis  lidocaine viscous (XYLOCAINE) 2 % solution     No results found for this visit on 04/13/19. Plan:       Exam findings and plan of care discussed at bedside including:    · Start Augmentin today as prescribed; administration and side effects reviewed. Encouraged that it be taken with food and a probiotic and examples give. · Supportive management discussed including: rest, hydration, OTC Tylenol or Ibuprofen as instructed on labelling. Warm compresses to ear to relieve pain. Elevate head of bed. Hot tea with honey and lemon for cough as needed. · Written instructions provided with AVS including Otitis Media, Augmentin, Tylneol and or ibuprofen weight specific dosing for pediatric age. · To ER or call 911 if any difficulty breathing, shortness of breath, inability to swallow, hives, rash, facial/tongue swelling or temp greater than 103 degrees. · Follow up with PCP as needed if symptoms worsen or do not improve. No follow-ups on file.     Orders Placed This Encounter   Medications    amoxicillin-clavulanate (AUGMENTIN) 875-125 MG per tablet     Sig: Take 1 tablet by mouth 2 times daily for 10 days     Dispense:  20 tablet     Refill:  0    lidocaine viscous (XYLOCAINE) 2 % solution     Sig: Take 15 mLs by mouth as needed for Irritation or Pain     Dispense:  1 Bottle     Refill:  0    benzonatate (TESSALON) 100 MG capsule     Sig: Take 1-2 capsules by mouth 3 times daily as needed for Cough     Dispense:  60 capsule     Refill:  0        Electronically signed by TOMMIE Corrales CNP on 4/13/2019 at 10:45 AM

## 2019-04-13 NOTE — LETTER
[facility]  [address]  [place] [address]  Phone: [phone]  Fax: [phone] [place], APRN - CNP        April 13, 2019     Patient: Celso Cavanaugh   YOB: 1985   Date of Visit: 4/13/2019       To Whom it May Concern:    Celso Cavanaugh was seen in my clinic on 4/13/2019. He may return to work on 4/14/19. If you have any questions or concerns, please don't hesitate to call.     Sincerely,           Nara Galvez, TOMMIE - CNP

## 2019-04-14 LAB
DIRECT EXAM: NORMAL
Lab: NORMAL
SPECIMEN DESCRIPTION: NORMAL

## 2019-04-15 ENCOUNTER — TELEPHONE (OUTPATIENT)
Dept: PRIMARY CARE CLINIC | Age: 34
End: 2019-04-15

## 2019-04-15 NOTE — TELEPHONE ENCOUNTER
----- Message from TOMMIE Stafford CNP sent at 4/15/2019 10:04 AM EDT -----  Please call and advise patient that his strep culture was negative.   Thanks, Mary Fang

## 2019-04-15 NOTE — TELEPHONE ENCOUNTER
Attempted to contact patient to let him know his strep culture results, no answer & mailbox is full so unable to leave message.

## 2019-05-20 ENCOUNTER — OFFICE VISIT (OUTPATIENT)
Dept: PRIMARY CARE CLINIC | Age: 34
End: 2019-05-20
Payer: COMMERCIAL

## 2019-05-20 VITALS
OXYGEN SATURATION: 98 % | WEIGHT: 311 LBS | DIASTOLIC BLOOD PRESSURE: 83 MMHG | TEMPERATURE: 97.6 F | HEART RATE: 74 BPM | SYSTOLIC BLOOD PRESSURE: 123 MMHG

## 2019-05-20 DIAGNOSIS — K52.9 GASTROENTERITIS, ACUTE: Primary | ICD-10-CM

## 2019-05-20 DIAGNOSIS — H66.002 ACUTE SUPPURATIVE OTITIS MEDIA OF LEFT EAR WITHOUT SPONTANEOUS RUPTURE OF TYMPANIC MEMBRANE, RECURRENCE NOT SPECIFIED: ICD-10-CM

## 2019-05-20 PROCEDURE — G8421 BMI NOT CALCULATED: HCPCS | Performed by: NURSE PRACTITIONER

## 2019-05-20 PROCEDURE — G8427 DOCREV CUR MEDS BY ELIG CLIN: HCPCS | Performed by: NURSE PRACTITIONER

## 2019-05-20 PROCEDURE — 4004F PT TOBACCO SCREEN RCVD TLK: CPT | Performed by: NURSE PRACTITIONER

## 2019-05-20 PROCEDURE — 99213 OFFICE O/P EST LOW 20 MIN: CPT | Performed by: NURSE PRACTITIONER

## 2019-05-20 RX ORDER — ONDANSETRON 4 MG/1
4 TABLET, ORALLY DISINTEGRATING ORAL EVERY 8 HOURS PRN
Qty: 9 TABLET | Refills: 0 | Status: SHIPPED | OUTPATIENT
Start: 2019-05-20 | End: 2019-09-01

## 2019-05-20 RX ORDER — AMOXICILLIN 875 MG/1
875 TABLET, COATED ORAL 2 TIMES DAILY
Qty: 20 TABLET | Refills: 0 | Status: SHIPPED | OUTPATIENT
Start: 2019-05-20 | End: 2019-05-30

## 2019-05-20 ASSESSMENT — ENCOUNTER SYMPTOMS
COUGH: 0
SORE THROAT: 0
VOMITING: 1
ABDOMINAL PAIN: 0
DIARRHEA: 1
WHEEZING: 0
NAUSEA: 1
BLOOD IN STOOL: 0
RHINORRHEA: 0
CHANGE IN BOWEL HABIT: 1
SHORTNESS OF BREATH: 0

## 2019-05-20 NOTE — PROGRESS NOTES
[facility] WALK-IN [facility]  [place] [id number]  Dept: [phone]  Dept Fax: [phone]     Nicole Closs is a 35 y.o. male who presents to the [facility] in Care today for hismedical conditions/complaints as noted below. Nicole Closs is c/o of Dizziness (Patient presents today fro dizziness, nausea, fatigue. Patient states he called off work yesterday and today. Patient denies ear pain, but states he had 1 episode of vomiting and \"slight\" diarrhea today as well )      HPI:     Nausea & Vomiting   This is a new problem. The current episode started yesterday (Started yesterday morning with nausea and dizziness. Woke up this morning vomiting with nausea, dizziness and diarrhea. Achy all over. \"Not hungry\"  Sipping on Powerade and keeping it down. ). Associated symptoms include a change in bowel habit, chills, diaphoresis, fatigue, myalgias, nausea and vomiting. Pertinent negatives include no abdominal pain, congestion, coughing, fever, headaches, rash or sore throat. Associated symptoms comments: Diarrhea today, 3-4 episodes. Vomited 5 times this morning, having dry heaves. . The symptoms are aggravated by drinking and eating (Wife had stomach flu couple weeks ago. ). He has tried nothing for the symptoms. The treatment provided no relief. History reviewed. No pertinent past medical history. Current Outpatient Medications   Medication Sig Dispense Refill    ondansetron (ZOFRAN-ODT) 4 MG disintegrating tablet Take 1 tablet by mouth every 8 hours as needed for Nausea or Vomiting 9 tablet 0    amoxicillin (AMOXIL) 875 MG tablet Take 1 tablet by mouth 2 times daily for 10 days 20 tablet 0    LATUDA 20 MG TABS tablet take 1 tablet by mouth daily  0     No current facility-administered medications for this visit.        No Known Allergies    Subjective:     Review of Systems   Constitutional: Positive for appetite change, chills, diaphoresis and fatigue. Negative for fever. HENT: Negative for congestion, ear pain, rhinorrhea and sore throat. Respiratory: Negative for cough, shortness of breath and wheezing. Gastrointestinal: Positive for change in bowel habit, diarrhea, nausea and vomiting. Negative for abdominal pain and blood in stool. Cramping in stomach. Musculoskeletal: Positive for myalgias. Skin: Negative for rash and wound. Neurological: Positive for dizziness. Negative for light-headedness and headaches. Objective:      Physical Exam   Constitutional: He is oriented to person, place, and time. Vital signs are normal. He appears well-developed and well-nourished. He is cooperative. He does not appear ill. No distress. HENT:   Head: Normocephalic and atraumatic. Right Ear: Hearing, tympanic membrane, external ear and ear canal normal. No drainage. No mastoid tenderness. Tympanic membrane is not injected, not erythematous and not bulging. No middle ear effusion. Left Ear: Hearing, external ear and ear canal normal. No drainage. No mastoid tenderness. Tympanic membrane is injected, erythematous (to entire TM) and bulging. A middle ear effusion is present. Nose: Nose normal. No mucosal edema or rhinorrhea. Right sinus exhibits no maxillary sinus tenderness and no frontal sinus tenderness. Left sinus exhibits no maxillary sinus tenderness and no frontal sinus tenderness. Mouth/Throat: Uvula is midline, oropharynx is clear and moist and mucous membranes are normal. Tonsils are 2+ on the right. Tonsils are 2+ on the left. No tonsillar exudate. Eyes: Pupils are equal, round, and reactive to light. Conjunctivae are normal.   Neck: Neck supple. Cardiovascular: Normal rate, regular rhythm, S1 normal, S2 normal and normal heart sounds. Exam reveals no gallop and no friction rub. No murmur heard. Pulmonary/Chest: Effort normal and breath sounds normal. No accessory muscle usage. No respiratory distress.  He has no decreased breath sounds. He has no wheezes. He has no rhonchi. He has no rales. No cough. Breath sounds clear B/L anterior and posterior lobes. Chest expansion symmetrical.  No audible wheezing or respiratory distress. No rales or rhonchi. Abdominal: Soft. Normal appearance. He exhibits no distension. Bowel sounds are increased. There is generalized tenderness. There is no rigidity, no rebound, no guarding, no tenderness at McBurney's point and negative Palacios's sign. Lymphadenopathy:     He has cervical adenopathy (B/L). Right cervical: Superficial cervical adenopathy present. No posterior cervical adenopathy present. Left cervical: Superficial cervical adenopathy present. No posterior cervical adenopathy present. Neurological: He is alert and oriented to person, place, and time. Skin: Skin is warm and dry. No rash noted. He is not diaphoretic. No erythema. No pallor. Psychiatric: He has a normal mood and affect. His behavior is normal.   Nursing note and vitals reviewed. /83   Pulse 74   Temp 97.6 °F (36.4 °C)   Wt (!) 311 lb (141.1 kg)   SpO2 98%     Assessment:      Diagnosis Orders   1. Gastroenteritis, acute  ondansetron (ZOFRAN-ODT) 4 MG disintegrating tablet   2. Acute suppurative otitis media of left ear without spontaneous rupture of tympanic membrane, recurrence not specified  amoxicillin (AMOXIL) 875 MG tablet       Plan:      Return if symptoms worsen or fail to improve, for Resume all previous medications as directed. Orders Placed This Encounter   Medications    ondansetron (ZOFRAN-ODT) 4 MG disintegrating tablet     Sig: Take 1 tablet by mouth every 8 hours as needed for Nausea or Vomiting     Dispense:  9 tablet     Refill:  0    amoxicillin (AMOXIL) 875 MG tablet     Sig: Take 1 tablet by mouth 2 times daily for 10 days     Dispense:  20 tablet     Refill:  0      1.   Gastroenteritis:  · Encouraged to increase fluids, gatorade and electrolyte solutions, 6-8 glasses per day. Avoid soft drinks and fruit juices. · Clear liquid diet for next 24 hours, broths, jello and oral rehydration solutions (Pedialyte). Avoid spicy, hot or high acid beverages or foods. Increase diet as tolerated, start with bland diet, dry toast, bananas, rice and applesauce and take smaller portions. · Meticulous handwashing to prevent spread of infection. · Zofran 4 mg ODT every 8 hours as needed for nausea  · Patient instructions given for viral gastroenteritis and Zofran. · Follow up with PCP or walk in care if symptoms worsen or do not improve. · To ER if unable to keep fluids down, severe abdominal pain, excessive bloody stool or rectal bleeding, prolonged symptoms greater than a week, weakness, any difficulty breathing, shortness of breath, inability to swallow, hives, rash, facial/tongue swelling or temp greater than 103 degrees. 2.  Otitis Media:  · Practice meticulous handwashing and cover cough to prevent spread of infection  · Encouraged to increase fluids and rest   · Continue antibiotic as prescribed until all doses completed. · Tylenol/Ibuprofen OTC PRN for pain, discomfort or fever as directed on package according to age/weight. · Warm compresses to ear to relieve discomfort  · Elevate head of bed (raise head of crib or use pillows for older children)  · Hot tea with honey and lemon for cough PRN   · Patient instructions given for otitis media and amoxicillin. · To ER or call 911 if any difficulty breathing, shortness of breath, inability to swallow, hives, rash, facial/tongue swelling or temp greater than 103 degrees. · Follow up with PCP as needed if symptoms worsen or do not improve. Fernanda Giordano received counseling on the following healthy behaviors: medication adherence. Patient given educational materials - see patient instructions. Discussed use,benefit, and side effects of prescribed medications. Treatment plan discussed atvisit. Continue routine health care follow up. All patient questions answered. Pt voiced understanding.       Electronically signed by Omelia Boxer, APRN - CNP on 5/20/2019 at 2:04 PM

## 2019-05-20 NOTE — PATIENT INSTRUCTIONS
SURVEY:    You may be receiving a survey from [employer] regarding your visit today. Please complete the survey to enable us to provide the highest quality of care to you and your family. If you cannot score us as very good on any question, please call the office to discuss how we could have made your experience exceptional.     Thank you. Patient Education        Gastroenteritis: Care Instructions  Your Care Instructions    Gastroenteritis is an illness that may cause nausea, vomiting, and diarrhea. It is sometimes called \"stomach flu. \" It can be caused by bacteria or a virus. You will probably begin to feel better in 1 to 2 days. In the meantime, get plenty of rest and make sure you do not become dehydrated. Dehydration occurs when your body loses too much fluid. Follow-up care is a key part of your treatment and safety. Be sure to make and go to all appointments, and call your doctor if you are having problems. It's also a good idea to know your test results and keep a list of the medicines you take. How can you care for yourself at home? · If your doctor prescribed antibiotics, take them as directed. Do not stop taking them just because you feel better. You need to take the full course of antibiotics. · Drink plenty of fluids to prevent dehydration, enough so that your urine is light yellow or clear like water. Choose water and other caffeine-free clear liquids until you feel better. If you have kidney, heart, or liver disease and have to limit fluids, talk with your doctor before you increase your fluid intake. · Drink fluids slowly, in frequent, small amounts, because drinking too much too fast can cause vomiting. · Begin eating mild foods, such as dry toast, yogurt, applesauce, bananas, and rice. Avoid spicy, hot, or high-fat foods, and do not drink alcohol or caffeine for a day or two. Do not drink milk or eat ice cream until you are feeling better.   How to prevent gastroenteritis  · Keep hot foods hot and cold foods cold. · Do not eat meats, dressings, salads, or other foods that have been kept at room temperature for more than 2 hours. · Use a thermometer to check your refrigerator. It should be between 34°F and 40°F.  · Defrost meats in the refrigerator or microwave, not on the kitchen counter. · Keep your hands and your kitchen clean. Wash your hands, cutting boards, and countertops with hot soapy water frequently. · Cook meat until it is well done. · Do not eat raw eggs or uncooked sauces made with raw eggs. · Do not take chances. If food looks or tastes spoiled, throw it out. When should you call for help? Call 911 anytime you think you may need emergency care. For example, call if:    · You vomit blood or what looks like coffee grounds.     · You passed out (lost consciousness).     · You pass maroon or very bloody stools.    Call your doctor now or seek immediate medical care if:    · You have severe belly pain.     · You have signs of needing more fluids. You have sunken eyes, a dry mouth, and pass only a little dark urine.     · You feel like you are going to faint.     · You have increased belly pain that does not go away in 1 to 2 days.     · You have new or increased nausea, or you are vomiting.     · You have a new or higher fever.     · Your stools are black and tarlike or have streaks of blood.    Watch closely for changes in your health, and be sure to contact your doctor if:    · You are dizzy or lightheaded.     · You urinate less than usual, or your urine is dark yellow or brown.     · You do not feel better with each day that goes by. Where can you learn more? Go to https://BioGenericspeContinuumRx.Entrisphere. org and sign in to your [employer] account. Enter N142 in the Amplify.LA box to learn more about \"Gastroenteritis: Care Instructions. \"     If you do not have an account, please click on the \"Sign Up Now\" link.   Current as of: July 30, 2018  Content Version: 12.0  © [id number] [employer]. Care instructions adapted under license by [facility] ([facility]). If you have questions about a medical condition or this instruction, always ask your healthcare professional. [address] any warranty or liability for your use of this information. Patient Education        ondansetron (oral)  Pronunciation:  on DAN se mariya  Brand:  Zofran, Zofran ODT, Janeen Ruiz  What is the most important information I should know about ondansetron? You should not use ondansetron if you are also using apomorphine (Apokyn). What is ondansetron? Ondansetron blocks the actions of chemicals in the body that can trigger nausea and vomiting. Ondansetron is used to prevent nausea and vomiting that may be caused by surgery, cancer chemotherapy, or radiation treatment. Ondansetron may be used for purposes not listed in this medication guide. What should I discuss with my health care provider before taking ondansetron? You should not use ondansetron if:  · you are also using apomorphine (Apokyn); or  · you are allergic to ondansetron or similar medicines (dolasetron, granisetron, palonosetron). To make sure ondansetron is safe for you, tell your doctor if you have:  · liver disease;  · an electrolyte imbalance (such as low levels of potassium or magnesium in your blood);  · congestive heart failure, slow heartbeats;  · a personal or family history of long QT syndrome; or  · a blockage in your digestive tract (stomach or intestines). Ondansetron is not expected to harm an unborn baby. Tell your doctor if you are pregnant. It is not known whether ondansetron passes into breast milk or if it could harm a nursing baby. Tell your doctor if you are breast-feeding a baby. Ondansetron is not approved for use by anyone younger than 3years old. Ondansetron orally disintegrating tablets may contain phenylalanine. Tell your doctor if you have phenylketonuria (PKU).   How should I take ondansetron? Follow all directions on your prescription label. Do not take this medicine in larger or smaller amounts or for longer than recommended. Ondansetron can be taken with or without food. The first dose of ondansetron is usually taken before the start of your surgery, chemotherapy, or radiation treatment. Follow your doctor's dosing instructions very carefully. Take the ondansetron regular tablet  with a full glass of water. To take the orally disintegrating tablet (Zofran ODT):  · Keep the tablet in its blister pack until you are ready to take it. Open the package and peel back the foil. Do not push a tablet through the foil or you may damage the tablet. · Use dry hands to remove the tablet and place it in your mouth. · Do not swallow the tablet whole. Allow it to dissolve in your mouth without chewing. · Swallow several times as the tablet dissolves. To use ondansetron oral soluble film (strip) (Alexus Haynes):  · Keep the strip in the foil pouch until you are ready to use the medicine. · Using dry hands, remove the strip and place it on your tongue. It will begin to dissolve right away. · Do not swallow the strip whole. Allow it to dissolve in your mouth without chewing. · Swallow several times after the strip dissolves. If desired, you may drink liquid to help swallow the dissolved strip. · Wash your hands after using Alexus Haynes. Measure liquid medicine with the dosing syringe provided, or with a special dose-measuring spoon or medicine cup. If you do not have a dose-measuring device, ask your pharmacist for one. Store at room temperature away from moisture, heat, and light. Store liquid medicine in an upright position. What happens if I miss a dose? Take the missed dose as soon as you remember. Skip the missed dose if it is almost time for your next scheduled dose. Do not  take extra medicine to make up the missed dose. What happens if I overdose?   Seek emergency medical attention or call the intended to cover all possible uses, directions, precautions, warnings, drug interactions, allergic reactions, or adverse effects. If you have questions about the drugs you are taking, check with your doctor, nurse or pharmacist.  Copyright 5906-4618 [address]: 13.01. Revision date: 10/21/2016. Care instructions adapted under license by [facility] ([facility]). If you have questions about a medical condition or this instruction, always ask your healthcare professional. [place] [address] any warranty or liability for your use of this information. Patient Education        Ear Infection (Otitis Media): Care Instructions  Your Care Instructions    An ear infection may start with a cold and affect the middle ear (otitis media). It can hurt a lot. Most ear infections clear up on their own in a couple of days. Most often you will not need antibiotics. This is because many ear infections are caused by a virus. Antibiotics don't work against a virus. Regular doses of pain medicines are the best way to reduce your fever and help you feel better. Follow-up care is a key part of your treatment and safety. Be sure to make and go to all appointments, and call your doctor if you are having problems. It's also a good idea to know your test results and keep a list of the medicines you take. How can you care for yourself at home? · Take pain medicines exactly as directed. ? If the doctor gave you a prescription medicine for pain, take it as prescribed. ? If you are not taking a prescription pain medicine, take an over-the-counter medicine, such as acetaminophen (Tylenol), ibuprofen (Advil, Motrin), or naproxen (Aleve). Read and follow all instructions on the label. ? Do not take two or more pain medicines at the same time unless the doctor told you to. Many pain medicines have acetaminophen, which is Tylenol. Too much acetaminophen (Tylenol) can be harmful.   · Plan to take a full dose of pain reliever before bedtime. Getting enough sleep will help you get better. · Try a warm, moist washcloth on the ear. It may help relieve pain. · If your doctor prescribed antibiotics, take them as directed. Do not stop taking them just because you feel better. You need to take the full course of antibiotics. When should you call for help? Call your doctor now or seek immediate medical care if:    · You have new or increasing ear pain.     · You have new or increasing pus or blood draining from your ear.     · You have a fever with a stiff neck or a severe headache.    Watch closely for changes in your health, and be sure to contact your doctor if:    · You have new or worse symptoms.     · You are not getting better after taking an antibiotic for 2 days. Where can you learn more? Go to https://EVOFEM.Peecho. org and sign in to your [employer] account. Enter I699 in the Snapkin box to learn more about \"Ear Infection (Otitis Media): Care Instructions. \"     If you do not have an account, please click on the \"Sign Up Now\" link. Current as of: October 21, 2018  Content Version: 12.0  © 3719-1848 [employer]. Care instructions adapted under license by [facility] ([facility]). If you have questions about a medical condition or this instruction, always ask your healthcare professional. [address] any warranty or liability for your use of this information. Patient Education        amoxicillin  Pronunciation:  am OX i myranda in  Brand:  Moxatag  What is the most important information I should know about amoxicillin? You should not use this medicine if you are allergic to any penicillin antibiotic. What is amoxicillin? Amoxicillin is a penicillin antibiotic that fights bacteria.   Amoxicillin is used to treat many different types of infection caused by bacteria, such as tonsillitis, bronchitis, pneumonia, gonorrhea, and infections of the ear, nose, throat, skin, or urinary tract. Amoxicillin is also sometimes used together with another antibiotic called clarithromycin (Biaxin) to treat stomach ulcers caused by Helicobacter pylori infection. This combination is sometimes used with a stomach acid reducer called lansoprazole (Prevacid). There are many brands and forms of amoxicillin available and not all brands are listed on this leaflet. Amoxicillin may also be used for purposes not listed in this medication guide. What should I discuss with my healthcare provider before taking amoxicillin? You should not use this medicine if you are allergic to any penicillin antibiotic, such as ampicillin, dicloxacillin, oxacillin, penicillin, or ticarcillin. To make sure amoxicillin is safe for you, tell your doctor if you have:  · asthma;  · liver or kidney disease;  · mononucleosis (also called \"mono\");  · a history of diarrhea caused by taking antibiotics; or  · food or drug allergies (especially to a cephalosporin antibiotic such as Omnicef, Cefzil, Ceftin, Keflex, and others). If you are being treated for gonorrhea, your doctor may also have you tested for syphilis, another sexually transmitted disease. Amoxicillin is not expected to harm an unborn baby. Tell your doctor if you are pregnant or plan to become pregnant during treatment. Amoxicillin can make birth control pills less effective. Ask your doctor about using non hormonal birth control (condom, diaphragm with spermicide) to prevent pregnancy while taking amoxicillin. Amoxicillin can pass into breast milk and may harm a nursing baby. Tell your doctor if you are breast-feeding a baby. The amoxicillin chewable tablet may contain phenylalanine. Talk to your doctor before using this form of amoxicillin if you have phenylketonuria (PKU). How should I take amoxicillin? Follow all directions on your prescription label. Do not take this medicine in larger or smaller amounts or for longer than recommended.   Take this may store liquid amoxicillin in a refrigerator but do not allow it to freeze. Throw away any liquid amoxicillin that is not used within 14 days after it was mixed at the pharmacy. What happens if I miss a dose? Take the missed dose as soon as you remember. Skip the missed dose if it is almost time for your next scheduled dose. Do not take extra medicine to make up the missed dose. What happens if I overdose? Seek emergency medical attention or call the Poison Help line at 1-626.620.3791. Overdose symptoms may include confusion, behavior changes, a severe skin rash, urinating less than usual, or seizure (black-out or convulsions). What should I avoid while taking amoxicillin? Antibiotic medicines can cause diarrhea, which may be a sign of a new infection. If you have diarrhea that is watery or bloody, stop using amoxicillin and call your doctor. Do not use anti-diarrhea medicine unless your doctor tells you to. What are the possible side effects of amoxicillin? Get emergency medical help if you have any of these signs of an allergic reaction: hives; difficulty breathing; swelling of your face, lips, tongue, or throat. Call your doctor at once if you have:  · diarrhea that is watery or bloody;  · fever, swollen gums, painful mouth sores, pain when swallowing, skin sores, cold or flu symptoms, cough, trouble breathing;  · swollen glands, rash or itching, joint pain, or general ill feeling;  · pale or yellowed skin, yellowing of the eyes, dark colored urine, fever, confusion or weakness;  · severe tingling, numbness, pain, muscle weakness;  · easy bruising, unusual bleeding (nose, mouth, vagina, or rectum), purple or red pinpoint spots under your skin; or  · severe skin reaction --fever, sore throat, swelling in your face or tongue, burning in your eyes, skin pain, followed by a red or purple skin rash that spreads (especially in the face or upper body) and causes blistering and peeling.   Common side effects may include:  · stomach pain, nausea, vomiting, diarrhea;  · vaginal itching or discharge;  · headache; or  · swollen, black, or \"hairy\" tongue. This is not a complete list of side effects and others may occur. Call your doctor for medical advice about side effects. You may report side effects to FDA at 8-180-XSF-0165. What other drugs will affect amoxicillin? Other drugs may interact with amoxicillin, including prescription and over-the-counter medicines, vitamins, and herbal products. Tell each of your health care providers about all medicines you use now and any medicine you start or stop using. Where can I get more information? Your pharmacist can provide more information about amoxicillin. Remember, keep this and all other medicines out of the reach of children, never share your medicines with others, and use this medication only for the indication prescribed. Every effort has been made to ensure that the information provided by [address] is accurate, up-to-date, and complete, but no guarantee is made to that effect. Drug information contained herein may be time sensitive. [employer] information has been compiled for use by healthcare practitioners and consumers in the [place] and therefore [employer] does not warrant that uses outside of the [place] are appropriate, unless specifically indicated otherwise. [employer] drug information does not endorse drugs, diagnose patients or recommend therapy. [employer] drug information is an informational resource designed to assist licensed healthcare practitioners in caring for their patients and/or to serve consumers viewing this service as a supplement to, and not a substitute for, the expertise, skill, knowledge and judgment of healthcare practitioners.  The absence of a warning for a given drug or drug combination in no way should be construed to indicate that the drug or drug combination is safe, effective or appropriate for any given · Tylenol/Ibuprofen OTC PRN for pain, discomfort or fever as directed on package according to age/weight. · Warm compresses to ear to relieve discomfort  · Elevate head of bed (raise head of crib or use pillows for older children)  · Hot tea with honey and lemon for cough PRN   · Patient instructions given for otitis media and amoxicillin. · To ER or call 911 if any difficulty breathing, shortness of breath, inability to swallow, hives, rash, facial/tongue swelling or temp greater than 103 degrees. · Follow up with PCP as needed if symptoms worsen or do not improve.

## 2019-09-01 ENCOUNTER — HOSPITAL ENCOUNTER (EMERGENCY)
Age: 34
Discharge: HOME | End: 2019-09-01
Attending: EMERGENCY MEDICINE
Payer: COMMERCIAL

## 2019-09-01 VITALS
HEIGHT: 71 IN | DIASTOLIC BLOOD PRESSURE: 85 MMHG | TEMPERATURE: 97.6 F | SYSTOLIC BLOOD PRESSURE: 145 MMHG | WEIGHT: 315 LBS | RESPIRATION RATE: 16 BRPM | BODY MASS INDEX: 44.1 KG/M2 | HEART RATE: 68 BPM | OXYGEN SATURATION: 98 %

## 2019-09-01 DIAGNOSIS — H66.015 RECURRENT ACUTE SUPPURATIVE OTITIS MEDIA WITH SPONTANEOUS RUPTURE OF LEFT TYMPANIC MEMBRANE: Primary | ICD-10-CM

## 2019-09-01 PROCEDURE — 99282 EMERGENCY DEPT VISIT SF MDM: CPT

## 2019-09-01 RX ORDER — AMOXICILLIN 500 MG/1
500 CAPSULE ORAL 3 TIMES DAILY
Qty: 30 CAPSULE | Refills: 0 | Status: SHIPPED | OUTPATIENT
Start: 2019-09-01 | End: 2019-09-11

## 2019-09-01 RX ORDER — OXYMETAZOLINE HYDROCHLORIDE 0.05 G/100ML
2 SPRAY NASAL 2 TIMES DAILY
Qty: 1 BOTTLE | Refills: 0 | Status: SHIPPED | OUTPATIENT
Start: 2019-09-01 | End: 2019-10-01

## 2019-09-01 ASSESSMENT — PAIN DESCRIPTION - ORIENTATION: ORIENTATION: LEFT

## 2019-09-01 ASSESSMENT — PAIN DESCRIPTION - PAIN TYPE: TYPE: ACUTE PAIN

## 2019-09-01 ASSESSMENT — PAIN DESCRIPTION - DESCRIPTORS: DESCRIPTORS: ACHING

## 2019-09-01 ASSESSMENT — PAIN SCALES - GENERAL: PAINLEVEL_OUTOF10: 4

## 2019-09-01 ASSESSMENT — PAIN DESCRIPTION - FREQUENCY: FREQUENCY: CONTINUOUS

## 2019-09-01 ASSESSMENT — PAIN DESCRIPTION - LOCATION: LOCATION: EAR

## 2019-09-01 NOTE — LETTER
[facility] ED  [address] [place], [address]  Phone: [phone]               September 1, 2019    Patient: Soco Larson   YOB: 1985   Date of Visit: 9/1/2019       To Whom It May Concern:    Soco Larson was seen and treated in our emergency department on 9/1/2019. He may return to work on 9/2/2019.       Sincerely,       Dr. Paul Caputo        Signature:__________________________________

## 2019-12-19 RX ORDER — AMOXICILLIN 500 MG/1
500 CAPSULE ORAL 3 TIMES DAILY
COMMUNITY
End: 2020-01-07

## 2019-12-19 RX ORDER — DICYCLOMINE HCL 20 MG
20 TABLET ORAL EVERY 6 HOURS
COMMUNITY
End: 2020-01-07

## 2019-12-19 RX ORDER — FLUTICASONE PROPIONATE 50 MCG
1 SPRAY, SUSPENSION (ML) NASAL DAILY
COMMUNITY
End: 2020-01-07

## 2020-01-07 ENCOUNTER — HOSPITAL ENCOUNTER (EMERGENCY)
Age: 35
Discharge: HOME | End: 2020-01-08
Attending: FAMILY MEDICINE
Payer: COMMERCIAL

## 2020-01-07 VITALS
TEMPERATURE: 98.2 F | OXYGEN SATURATION: 100 % | SYSTOLIC BLOOD PRESSURE: 138 MMHG | HEART RATE: 80 BPM | RESPIRATION RATE: 16 BRPM | DIASTOLIC BLOOD PRESSURE: 92 MMHG | BODY MASS INDEX: 43.54 KG/M2 | WEIGHT: 312.2 LBS

## 2020-01-07 PROCEDURE — 99283 EMERGENCY DEPT VISIT LOW MDM: CPT

## 2020-01-07 ASSESSMENT — PAIN DESCRIPTION - ORIENTATION: ORIENTATION: RIGHT

## 2020-01-07 ASSESSMENT — PAIN SCALES - GENERAL: PAINLEVEL_OUTOF10: 10

## 2020-01-07 ASSESSMENT — PAIN DESCRIPTION - PAIN TYPE: TYPE: ACUTE PAIN

## 2020-01-07 ASSESSMENT — PAIN DESCRIPTION - DESCRIPTORS: DESCRIPTORS: THROBBING;SHOOTING;ACHING

## 2020-01-07 ASSESSMENT — PAIN DESCRIPTION - LOCATION: LOCATION: ARM

## 2020-01-08 NOTE — ED PROVIDER NOTES
symptoms and exam findings, discussed clinical diagnosis neuropraxia, likely affecting the right median nerve, expected timeline of several hours to even a few days, discussed Motrin Tylenol for pain, importance of hydration, continue range of motion, follow-up with primary care provider, if symptoms not improved patient may need to see neurology, patient acknowledges    FINAL IMPRESSION      1. Neurapraxia of right median nerve, initial encounter          DISPOSITION/PLAN   D/c    PATIENT REFERRED TO:  Mendez Marie  [facility] [id number]    Call       Jay Parnell MD  [address] [facility] [address]  [phone]    Call   Neurology, Call As needed      DISCHARGE MEDICATIONS:  Discharge Medication List as of 1/8/2020 12:48 AM              Summation      Patient Course:d/c    ED Medications administered this visit:  Medications - No data to display    New Prescriptions from this visit:    Discharge Medication List as of 1/8/2020 12:48 AM          Follow-up:  Mendez Marie  [address] [address] [address]  [phone]    Call       Jay Parnell MD  [address] [facility] [address]  [phone]    Call   Neurology, Call As needed        Final Impression:   1.  Neurapraxia of right median nerve, initial encounter               (Please note that portions of this note were completed with a voice recognition program.  Efforts were made to edit the dictations but occasionally words are mis-transcribed.)    MD Ishan Nash MD  01/08/20 0504

## 2020-01-15 ENCOUNTER — OFFICE VISIT (OUTPATIENT)
Dept: ENT CLINIC | Age: 35
End: 2020-01-15
Payer: COMMERCIAL

## 2020-01-15 VITALS
OXYGEN SATURATION: 96 % | DIASTOLIC BLOOD PRESSURE: 80 MMHG | BODY MASS INDEX: 43.26 KG/M2 | RESPIRATION RATE: 18 BRPM | SYSTOLIC BLOOD PRESSURE: 121 MMHG | HEIGHT: 71 IN | WEIGHT: 309 LBS | HEART RATE: 75 BPM

## 2020-01-15 PROBLEM — H74.42 GRANULATION POLYP OF MIDDLE EAR, LEFT: Status: ACTIVE | Noted: 2020-01-15

## 2020-01-15 PROBLEM — H70.12 CHRONIC INFLAMMATION OF LEFT MASTOID: Status: ACTIVE | Noted: 2020-01-15

## 2020-01-15 PROCEDURE — G8484 FLU IMMUNIZE NO ADMIN: HCPCS | Performed by: OTOLARYNGOLOGY

## 2020-01-15 PROCEDURE — G8427 DOCREV CUR MEDS BY ELIG CLIN: HCPCS | Performed by: OTOLARYNGOLOGY

## 2020-01-15 PROCEDURE — G8419 CALC BMI OUT NRM PARAM NOF/U: HCPCS | Performed by: OTOLARYNGOLOGY

## 2020-01-15 PROCEDURE — 99243 OFF/OP CNSLTJ NEW/EST LOW 30: CPT | Performed by: OTOLARYNGOLOGY

## 2020-01-15 RX ORDER — CIPROFLOXACIN AND DEXAMETHASONE 3; 1 MG/ML; MG/ML
4 SUSPENSION/ DROPS AURICULAR (OTIC) 2 TIMES DAILY
Qty: 1 BOTTLE | Refills: 0 | Status: SHIPPED | OUTPATIENT
Start: 2020-01-15 | End: 2020-01-22

## 2020-01-15 ASSESSMENT — ENCOUNTER SYMPTOMS
NAUSEA: 0
VOICE CHANGE: 0
ABDOMINAL DISTENTION: 0
SHORTNESS OF BREATH: 0
RHINORRHEA: 0
DIARRHEA: 0
SORE THROAT: 0
COUGH: 0
WHEEZING: 0
EYE DISCHARGE: 0
CHOKING: 0
APNEA: 0
SINUS PAIN: 0
CONSTIPATION: 0
CHEST TIGHTNESS: 0
EYE REDNESS: 0
STRIDOR: 0
BLOOD IN STOOL: 0
EYE ITCHING: 0
TROUBLE SWALLOWING: 0
ANAL BLEEDING: 0
BACK PAIN: 0
ABDOMINAL PAIN: 0
EYE PAIN: 0
PHOTOPHOBIA: 0
FACIAL SWELLING: 0
COLOR CHANGE: 0
RECTAL PAIN: 0
VOMITING: 0
SINUS PRESSURE: 0

## 2020-01-15 NOTE — PROGRESS NOTES
file     Non-medical: Not on file   Tobacco Use    Smoking status: Current Every Day Smoker     Packs/day: 0.50     Types: Cigarettes    Smokeless tobacco: Never Used   Substance and Sexual Activity    Alcohol use: No    Drug use: Never    Sexual activity: Not on file   Lifestyle    Physical activity:     Days per week: Not on file     Minutes per session: Not on file    Stress: Not on file   Relationships    Social connections:     Talks on phone: Not on file     Gets together: Not on file     Attends Mu-ism service: Not on file     Active member of club or organization: Not on file     Attends meetings of clubs or organizations: Not on file     Relationship status: Not on file    Intimate partner violence:     Fear of current or ex partner: Not on file     Emotionally abused: Not on file     Physically abused: Not on file     Forced sexual activity: Not on file   Other Topics Concern    Not on file   Social History Narrative    Not on file     History reviewed. No pertinent family history.      PHYSICAL EXAM:    The patient was examined today 1/15/2020 with findings as follows:    CONSTITUTIONAL:    General Appearance: well-appearing, nontoxic, alert, no acute distress     Communication: understanding at normal conversational tones, normal voicing, speech intelligible    HEAD/FACE:    Head: atraumatic, normocephalic, no lesions    Facial Inspection: no lesions, healthy skin    Facial Strength: motor strength normal, symmetric strength, symmetric movement, motor strength    Sinuses: no sinus tenderness    Salivary Glands: no enlargements of parotid glands, no tenderness of parotid glands, no masses of parotid glands, clear salivary flow on palpation from Stensen's ducts, no duct stones of Stensen's duct, no enlargement of submandibular glands, no tenderness of submandibular glands, no masses of submandibular glands, clear salivary flow from Sabrina's ducts, no stones of [place]'s ducts    Temporomandibular Joint: no crepitus with motion, no tenderness on palpation, no trismus, motion symmetric    EYES:    Pupils: PERRLA, extra-ocular movements intact, no nystagmus, sclera white, no redness of eyes, no watering of eyes    EARS:    Bilateral External Ears: no pits, no tags    Right External Ear: normally formed, no lesions, no mastoid tenderness    Left External Ear: normally formed, no lesions, no mastoid tenderness    Right External Auditory Canal: normal, healthy skin, no obstructing cerumen, no discharge    Left External Auditory Canal: normal, healthy skin, no obstructing cerumen, no discharge    Right Tympanic Membrane: normal landmarks, translucent, mobile to pneumatic otoscopy, no perforation    Left Tympanic Membrane: large granulation tissue extending from likely perforation    Hearing: intact to spoken voice, intact to finger rub, Garcia midline, Right Ear: Rinne AC>BC, Left Left Ear: Rinne AC<BC    NOSE:    Nasal Skin: no lesions, no lacerations, no scars    Nasal Dorsum: symmetric with no visible or palpable deformities    Nasal Tip: normal symmetric nasal tip, normal nasal valves    Nasal Mucosa: normal, pink and moist    Septum: not markedly deformed, midline, no exposed vessels, no bleeding, no septal granuloma    Turbinates: normal size and conformation    Nasopharynx: normal    ORAL CAVITY/MOUTH:    Lips, teeth, gums: normal lips, normal gums, dentition intact, no dental pain on palpation    Oral Mucosa: normal, moist, no lesions    Palate: normal hard palate, normal soft palate, symmetric palatal elevation    Floor of Mouth: normal floor of mouth    Tongue: normal tongue, no lesions, no edema, no masses, normal mucosa, mobile    Tonsils: 3+ tonsils, symmetric, no lesions    Posterior pharynx: normal    NECK:    Neck: no masses, area of concern is prominent clavicular head of SCM muscle, trachea midline, normal range of motion, no cysts or pits, no tenderness to palpation    Thyroid: normal thyroid, no enlargement, no tenderness, no nodules    LYMPH NODES:    Cervical: no palpable lymph node enlargement    SKIN:    General Appearance: no lesions, warm and dry, normal turgor, no bruising    NEUROLOGICAL SYSTEM:    Orientation: oriented to time, oriented to place, oriented to person    Cranial Nerves: Cranial Nerves II-XII intact, normal facial movement    PSYCHIATRIC:    Mood and affect: normal mood, normal affect            Assessment and Plan:    The patient and/or caregiver is advised on the use of any prescribed medication. The avoidance of water exposure to the ear and mechanical trauma such as the use of cotton tipped swabs or the insertion of objects into the ear is advised. The patient and/or caregiver is to notify the office if no improvement or worsening of symptoms is noted prior to the scheduled follow-up for sooner evaluation. The patient and/or caregiver is able to state an understanding of these recommendations and is agreeable to the treatment plan. He has a long history of left ear disease highly suggestive of cholesteatoma. Topical treatment and follow-up imaging is advised for further assessment. I do not appreciate a discrete mass in the neck and suspect a palpable neck muscle possibly with spasm from chronic mastoid inflammation. Diagnosis Orders   1. Chronic inflammation of left mastoid     2. Granulation polyp of middle ear, left  ciprofloxacin-dexamethasone (CIPRODEX) 0.3-0.1 % otic suspension   3. Neck mass          The patient and/or caregiver is to notify the office if no improvement or worsening of symptoms is noted prior to the scheduled follow-up for sooner evaluation. The patient and/or caregiver is able to state an understanding of these recommendations and is agreeable to the treatment plan.

## 2020-01-15 NOTE — PROGRESS NOTES
Review of Systems   Constitutional: Negative for activity change, appetite change, chills, diaphoresis, fatigue, fever and unexpected weight change. HENT: Positive for congestion, ear discharge, ear pain and hearing loss. Negative for dental problem, drooling, facial swelling, mouth sores, nosebleeds, postnasal drip, rhinorrhea, sinus pressure, sinus pain, sneezing, sore throat, tinnitus, trouble swallowing and voice change. Eyes: Negative for photophobia, pain, discharge, redness, itching and visual disturbance. Respiratory: Negative for apnea, cough, choking, chest tightness, shortness of breath, wheezing and stridor. Cardiovascular: Negative for chest pain, palpitations and leg swelling. Gastrointestinal: Negative for abdominal distention, abdominal pain, anal bleeding, blood in stool, constipation, diarrhea, nausea, rectal pain and vomiting. Endocrine: Negative for cold intolerance, heat intolerance, polydipsia, polyphagia and polyuria. Genitourinary: Negative for decreased urine volume, difficulty urinating, discharge, dysuria, enuresis, flank pain, frequency, genital sores, hematuria, penile pain, penile swelling, scrotal swelling, testicular pain and urgency. Musculoskeletal: Negative for arthralgias, back pain, gait problem, joint swelling, myalgias, neck pain and neck stiffness. Skin: Negative for color change, pallor, rash and wound. Allergic/Immunologic: Negative for environmental allergies, food allergies and immunocompromised state. Neurological: Negative for dizziness, tremors, seizures, syncope, facial asymmetry, speech difficulty, weakness, light-headedness, numbness and headaches. Hematological: Negative for adenopathy. Does not bruise/bleed easily. Psychiatric/Behavioral: Negative for agitation, behavioral problems, confusion, decreased concentration, dysphoric mood, hallucinations, self-injury, sleep disturbance and suicidal ideas.  The patient is not nervous/anxious and is not hyperactive.

## 2020-02-19 ENCOUNTER — OFFICE VISIT (OUTPATIENT)
Dept: ENT CLINIC | Age: 35
End: 2020-02-19
Payer: COMMERCIAL

## 2020-02-19 VITALS
HEIGHT: 71 IN | SYSTOLIC BLOOD PRESSURE: 137 MMHG | WEIGHT: 314.6 LBS | BODY MASS INDEX: 44.04 KG/M2 | DIASTOLIC BLOOD PRESSURE: 84 MMHG | HEART RATE: 74 BPM

## 2020-02-19 PROCEDURE — G8484 FLU IMMUNIZE NO ADMIN: HCPCS | Performed by: OTOLARYNGOLOGY

## 2020-02-19 PROCEDURE — 92567 TYMPANOMETRY: CPT | Performed by: AUDIOLOGIST

## 2020-02-19 PROCEDURE — 92557 COMPREHENSIVE HEARING TEST: CPT | Performed by: AUDIOLOGIST

## 2020-02-19 PROCEDURE — 99214 OFFICE O/P EST MOD 30 MIN: CPT | Performed by: OTOLARYNGOLOGY

## 2020-02-19 PROCEDURE — G8417 CALC BMI ABV UP PARAM F/U: HCPCS | Performed by: OTOLARYNGOLOGY

## 2020-02-19 PROCEDURE — 4004F PT TOBACCO SCREEN RCVD TLK: CPT | Performed by: OTOLARYNGOLOGY

## 2020-02-19 PROCEDURE — G8427 DOCREV CUR MEDS BY ELIG CLIN: HCPCS | Performed by: OTOLARYNGOLOGY

## 2020-02-19 PROCEDURE — G8428 CUR MEDS NOT DOCUMENT: HCPCS | Performed by: AUDIOLOGIST

## 2020-02-19 ASSESSMENT — ENCOUNTER SYMPTOMS
SHORTNESS OF BREATH: 0
EYE ITCHING: 0
CHOKING: 0
RECTAL PAIN: 0
CONSTIPATION: 0
TROUBLE SWALLOWING: 1
CHEST TIGHTNESS: 0
COUGH: 0
FACIAL SWELLING: 0
ANAL BLEEDING: 0
BACK PAIN: 0
WHEEZING: 0
COLOR CHANGE: 0
EYE PAIN: 0
VOICE CHANGE: 1
SORE THROAT: 0
DIARRHEA: 0
ABDOMINAL PAIN: 0
SINUS PRESSURE: 0
PHOTOPHOBIA: 0
RHINORRHEA: 0
ABDOMINAL DISTENTION: 0
NAUSEA: 0
EYE DISCHARGE: 0
STRIDOR: 0
VOMITING: 0
BLOOD IN STOOL: 0
EYE REDNESS: 0

## 2020-02-19 NOTE — PROGRESS NOTES
feeling \"off balance\". Denies spinning sensation. Denies tinnitus/ringing. Review of systems covering 10 systems is reviewed as staff entry in other note and pertinent positives and negatives noted. Past Medical History:   Diagnosis Date    Bipolar 1 disorder ([address]     Hypertension     Obesity     Pre-diabetes      No current outpatient medications on file.    No Known Allergies   Past Surgical History:   Procedure Laterality Date    TESTICLE REMOVAL      TONSILLECTOMY      TYMPANOSTOMY TUBE PLACEMENT      TYMPANOSTOMY TUBE PLACEMENT      UMBILICAL HERNIA REPAIR        Social History     Socioeconomic History    Marital status:      Spouse name: Not on file    Number of children: Not on file    Years of education: Not on file    Highest education level: Not on file   Occupational History    Not on file   Social Needs    Financial resource strain: Not on file    Food insecurity:     Worry: Not on file     Inability: Not on file    Transportation needs:     Medical: Not on file     Non-medical: Not on file   Tobacco Use    Smoking status: Current Every Day Smoker     Packs/day: 0.50     Types: Cigarettes    Smokeless tobacco: Never Used   Substance and Sexual Activity    Alcohol use: No    Drug use: Never    Sexual activity: Not on file   Lifestyle    Physical activity:     Days per week: Not on file     Minutes per session: Not on file    Stress: Not on file   Relationships    Social connections:     Talks on phone: Not on file     Gets together: Not on file     Attends Adventism service: Not on file     Active member of club or organization: Not on file     Attends meetings of clubs or organizations: Not on file     Relationship status: Not on file    Intimate partner violence:     Fear of current or ex partner: Not on file     Emotionally abused: Not on file     Physically abused: Not on file     Forced sexual activity: Not on file   Other Topics Concern    Not on file Rinne AC<BC     NOSE:     Nasal Skin: no lesions, no lacerations, no scars     Nasal Dorsum: symmetric with no visible or palpable deformities     Nasal Tip: normal symmetric nasal tip, normal nasal valves     Nasal Mucosa: normal, pink and moist     Septum: not markedly deformed, midline, no exposed vessels, no bleeding, no septal granuloma     Turbinates: normal size and conformation     Nasopharynx: normal     ORAL CAVITY/MOUTH:     Lips, teeth, gums: normal lips, normal gums, dentition intact, no dental pain on palpation     Oral Mucosa: normal, moist, no lesions     Palate: normal hard palate, normal soft palate, symmetric palatal elevation     Floor of Mouth: normal floor of mouth     Tongue: normal tongue, no lesions, no edema, no masses, normal mucosa, mobile     Tonsils: 3+ tonsils, symmetric, no lesions     Posterior pharynx: normal     NECK:     Neck: no masses, area of concern is prominent clavicular head of SCM muscle, trachea midline, normal range of motion, no cysts or pits, no tenderness to palpation     Thyroid: normal thyroid, no enlargement, no tenderness, no nodules     LYMPH NODES:     Cervical: no palpable lymph node enlargement     SKIN:     General Appearance: no lesions, warm and dry, normal turgor, no bruising     NEUROLOGICAL SYSTEM:     Orientation: oriented to time, oriented to place, oriented to person     Cranial Nerves: Cranial Nerves II-XII intact, normal facial movement     PSYCHIATRIC:     Mood and affect: normal mood, normal affect      Assessment and Plan:    His granulation tissue has resolved. A large perforation with a white mass highly suggestive of cholesteatoma is noted and imaging for further assessment is advised. Audiometric evaluaiton reveals a left sided moderate conductive hearing loss and a large volume tympanogram consistent with these findings. Diagnosis Orders   1.  Cholesteatoma of attic, left  Ambulatory referral to Audiology    CT IAC POSTERIOR FOSSA WO CONTRAST   2. Conductive hearing loss of left ear with unrestricted hearing of right ear  Ambulatory referral to Audiology        The patient and/or caregiver is to notify the office if no improvement or worsening of symptoms is noted prior to the scheduled follow-up for sooner evaluation. The patient and/or caregiver is able to state an understanding of these recommendations and is agreeable to the treatment plan.

## 2020-02-19 NOTE — PROGRESS NOTES
Nisa Bhat   1985, 29 y.o. male   [id number]       Referring Provider: Dr. Shae Whittaker is a 29 y.o. male seen today, 02/19/20 , for a same-day add-on audiologic evaluation. AUDIOLOGIC HISTORY:    Nisa Bhat noted hearing loss, otalgia and dizziness. Nisa Bhat denied aural fullness, tinnitus and history of ear surgery. ASSESSMENT AND FINDINGS:    Otoscopy revealed: minimal cerumen accumulation bilaterally. RIGHT EAR:  Tympanometry: Normal peak pressure and compliance, Type A tympanogram, consistent with normal middle ear function. Acoustic Reflexes: Ipsilateral: Did not test. Contralateral: Did not test.  Hearing Sensitivity: Normal hearing sensitivity  Word Recognition: Excellent (%), based on NU-6 10-word list at 55 dBHL using recorded speech stimuli. LEFT EAR:  Tympanometry: Normal peak pressure and compliance, Type A tympanogram, consistent with normal middle ear function. Acoustic Reflexes: Ipsilateral: Did not test. Contralateral: Did not test.  Hearing Sensitivity: Moderate conductive hearing loss rising to normal hearing. Word Recognition: Excellent (%), based on NU-6 10-word list at 85 dBHL using recorded speech stimuli. See scanned tympanograms and audiogram for results. RECOMMENDATIONS:                                                                                                                                                                                                                                                                      - Continue medical follow-up with Dr. Ayan Hutchinson. and  - Retest hearing as medically indicated and/or sooner if a change in hearing is noted.          Electronically signed by Tex Wolff

## 2020-02-27 ENCOUNTER — HOSPITAL ENCOUNTER (OUTPATIENT)
Dept: CT IMAGING | Age: 35
Discharge: HOME | End: 2020-02-29
Payer: COMMERCIAL

## 2020-02-27 PROCEDURE — 70480 CT ORBIT/EAR/FOSSA W/O DYE: CPT

## 2020-03-11 ENCOUNTER — OFFICE VISIT (OUTPATIENT)
Dept: ENT CLINIC | Age: 35
End: 2020-03-11
Payer: COMMERCIAL

## 2020-03-11 VITALS
WEIGHT: 314 LBS | HEART RATE: 74 BPM | HEIGHT: 71 IN | BODY MASS INDEX: 43.96 KG/M2 | DIASTOLIC BLOOD PRESSURE: 87 MMHG | RESPIRATION RATE: 20 BRPM | TEMPERATURE: 97.1 F | SYSTOLIC BLOOD PRESSURE: 129 MMHG

## 2020-03-11 PROCEDURE — G8417 CALC BMI ABV UP PARAM F/U: HCPCS | Performed by: OTOLARYNGOLOGY

## 2020-03-11 PROCEDURE — G8427 DOCREV CUR MEDS BY ELIG CLIN: HCPCS | Performed by: OTOLARYNGOLOGY

## 2020-03-11 PROCEDURE — 4004F PT TOBACCO SCREEN RCVD TLK: CPT | Performed by: OTOLARYNGOLOGY

## 2020-03-11 PROCEDURE — G8484 FLU IMMUNIZE NO ADMIN: HCPCS | Performed by: OTOLARYNGOLOGY

## 2020-03-11 PROCEDURE — 99214 OFFICE O/P EST MOD 30 MIN: CPT | Performed by: OTOLARYNGOLOGY

## 2020-03-11 ASSESSMENT — ENCOUNTER SYMPTOMS
SINUS PAIN: 1
NAUSEA: 1
COUGH: 0
VOMITING: 0
ABDOMINAL PAIN: 0
COLOR CHANGE: 0
SINUS PRESSURE: 1
SORE THROAT: 0
EYE DISCHARGE: 0
RHINORRHEA: 1
EYE REDNESS: 0
EYE ITCHING: 0
PHOTOPHOBIA: 0
DIARRHEA: 0
FACIAL SWELLING: 0
STRIDOR: 0
CHEST TIGHTNESS: 0
TROUBLE SWALLOWING: 0
BACK PAIN: 0
ABDOMINAL DISTENTION: 0
RECTAL PAIN: 0
VOICE CHANGE: 0
WHEEZING: 0
CHOKING: 0
SHORTNESS OF BREATH: 0
BLOOD IN STOOL: 0
EYE PAIN: 0
CONSTIPATION: 0
ANAL BLEEDING: 0
APNEA: 0

## 2020-03-11 NOTE — PROGRESS NOTES
[facility] [address] [place] [address] [facility]  [address] [address]  Dept: [phone]  Radha Christian MD    Jie Cochran 29 y.o. male     Patient presents with a chief complaint of Follow-up (f/u CT CT IAC posterior fossa 02/27/20. Patient complains of pressure in left ear.)       /87 (Site: Left Upper Arm, Position: Sitting, Cuff Size: Large Adult)   Pulse 74   Temp 97.1 °F (36.2 °C) (Oral)   Resp 20   Ht 5' 11\" (1.803 m)   Wt (!) 314 lb (142.4 kg)   BMI 43.79 kg/m²       History of Presenting Illness: The patient/caregiver reports a history of complaint with the following features: Onset:   Hearing loss in left ear x years. No change since last visit. Timing:  Gradual onset  Duration:  Hearing loss constant in left ear. Constant left ear pain, soreness type. Intermittent left ear fullness/pressure. Quality:  Hearing loss left ear, off balance, left ear pain  Location:  Left ear  Denies right ear complaints. Severity:   Current level of ear pain moderate. Risk factors/other information:  Ear tubes multiple times during childhood. Can't remember how many. Denies other types of ear surgery. Hx of ear infections. Denies Hx of ear trauma or head trauma. Denies water getting in left ear. Tries to prevent water from getting in ear. Will wear a cotton ball with water exposure. Alleviating factors:  Nothing. No relief with OTC pain relievers. No relief with Ciprodex. Aggravating factors:  Nothing   Associated factors:  Denies ear drainage. Intermittent left ear fullness/pressure. Lately noticing feeling \"off balance\". Denies spinning sensation. Denies tinnitus/ringing. Review of systems covering 10 systems is reviewed as staff entry in other note and pertinent positives and negatives noted.     Past Medical History:   Diagnosis Date    Bipolar 1 disorder ([facility] [address]     Hypertension     Obesity     Pre-diabetes      No current outpatient medications on file. No Known Allergies   Past Surgical History:   Procedure Laterality Date    TESTICLE REMOVAL      TONSILLECTOMY      TYMPANOSTOMY TUBE PLACEMENT      TYMPANOSTOMY TUBE PLACEMENT      UMBILICAL HERNIA REPAIR        Social History     Socioeconomic History    Marital status:      Spouse name: Not on file    Number of children: Not on file    Years of education: Not on file    Highest education level: Not on file   Occupational History    Not on file   Social Needs    Financial resource strain: Not on file    Food insecurity     Worry: Not on file     Inability: Not on file    Transportation needs     Medical: Not on file     Non-medical: Not on file   Tobacco Use    Smoking status: Current Every Day Smoker     Packs/day: 0.50     Types: Cigarettes    Smokeless tobacco: Never Used   Substance and Sexual Activity    Alcohol use: No    Drug use: Never    Sexual activity: Not on file   Lifestyle    Physical activity     Days per week: Not on file     Minutes per session: Not on file    Stress: Not on file   Relationships    Social connections     Talks on phone: Not on file     Gets together: Not on file     Attends Christian service: Not on file     Active member of club or organization: Not on file     Attends meetings of clubs or organizations: Not on file     Relationship status: Not on file    Intimate partner violence     Fear of current or ex partner: Not on file     Emotionally abused: Not on file     Physically abused: Not on file     Forced sexual activity: Not on file   Other Topics Concern    Not on file   Social History Narrative    Not on file     History reviewed. No pertinent family history.      PHYSICAL EXAM:     The patient was examined today 3/11/2020 with findings as follows:     CONSTITUTIONAL:     General Appearance: well-appearing, nontoxic, alert, no acute distress      Communication: understanding at normal conversational tones, normal voicing, speech intelligible     HEAD/FACE:     Head: atraumatic, normocephalic, no lesions     Facial Inspection: no lesions, healthy skin     Facial Strength: motor strength normal, symmetric strength, symmetric movement, motor strength     Sinuses: no sinus tenderness     Salivary Glands: no enlargements of parotid glands, no tenderness of parotid glands, no masses of parotid glands, clear salivary flow on palpation from Stensen's ducts, no duct stones of Stensen's duct, no enlargement of submandibular glands, no tenderness of submandibular glands, no masses of submandibular glands, clear salivary flow from [place]'s ducts, no stones of [place]'s ducts     Temporomandibular Joint: no crepitus with motion, no tenderness on palpation, no trismus, motion symmetric     EYES:     Pupils: PERRLA, extra-ocular movements intact, no nystagmus, sclera white, no redness of eyes, no watering of eyes     EARS:     Bilateral External Ears: no pits, no tags     Right External Ear: normally formed, no lesions, no mastoid tenderness     Left External Ear: normally formed, no lesions, no mastoid tenderness     Right External Auditory Canal: normal, healthy skin, no obstructing cerumen, no discharge     Left External Auditory Canal: normal, healthy skin, no obstructing cerumen, no discharge     Right Tympanic Membrane: normal landmarks, translucent, mobile to pneumatic otoscopy, no perforation     Left Tympanic Membrane: large perforation with bulging pink-whitish area superiorly     Hearing: intact to spoken voice, intact to finger rub, Garcia midline, Right Ear: Rinne AC>BC, Left Left Ear: Rinne AC<BC     NOSE:     Nasal Skin: no lesions, no lacerations, no scars     Nasal Dorsum: symmetric with no visible or palpable deformities     Nasal Tip: normal symmetric nasal tip, normal nasal valves     Nasal Mucosa: normal, pink and moist     Septum: not markedly deformed, midline, no exposed vessels, no bleeding, no septal granuloma     Turbinates: normal size and conformation     Nasopharynx: normal     ORAL CAVITY/MOUTH:     Lips, teeth, gums: normal lips, normal gums, dentition intact, no dental pain on palpation     Oral Mucosa: normal, moist, no lesions     Palate: normal hard palate, normal soft palate, symmetric palatal elevation     Floor of Mouth: normal floor of mouth     Tongue: normal tongue, no lesions, no edema, no masses, normal mucosa, mobile     Tonsils: 3+ tonsils, symmetric, no lesions     Posterior pharynx: normal     NECK:     Neck: no masses, trachea midline, normal range of motion, no cysts or pits, no tenderness to palpation     Thyroid: normal thyroid, no enlargement, no tenderness, no nodules     LYMPH NODES:     Cervical: no palpable lymph node enlargement    RESPIRATORY:    Inspection/Auscultation: good air movement, chest expands symmetrically, normal breath sounds, no wheezing, no stridor    CARDIOVASCULAR SYSTEM:    Auscultation: regular rate and rhythm, carotid pulse normal, no carotid thrills, no carotid bruits    Observation/Palpation of Peripheral Vascular System: no varicosities, no cyanosis, no edema    SKIN:     General Appearance: no lesions, warm and dry, normal turgor, no bruising     NEUROLOGICAL SYSTEM:     Orientation: oriented to time, oriented to place, oriented to person     Cranial Nerves: Cranial Nerves II-XII intact, normal facial movement     PSYCHIATRIC:     Mood and affect: normal mood, normal affect      Assessment and Plan:    His granulation tissue has resolved. A large perforation with a white mass highly suggestive of cholesteatoma. CT of the left mastoid reveals extensive opacification of the mastoid and attic again consistent with chronic mastoiditis or cholesteatoma.   Given that he has chronic complaints without relief with medical therapy, surgical treatment is advised with mastoidectomy and reconstruction of the ossicular chain in hopes of improved hearing, although the primary goal is control of disease and discomfort. Surgical treatment is recommended upon review of the patient's history, clinical presentation, exam, and available testing and laboratory data. The risks, alternatives, potential complications, and benefits of surgery are discussed at length. The surgical procedure, pre-operative preparation, and post-operative course are discussed. Any questions are answered to the patient's and/or caregiver's satisfaction and they are agreeable to proceed. An informational sheet covering this information is also provided. Witnessed informed surgical consent is signed in the office. The patient and/or caregiver is able to state an understanding of these recommendations and is agreeable to the treatment plan. Diagnosis Orders   1. Cholesteatoma of attic, left     2. Chronic mastoiditis of left side     3. Conductive hearing loss of left ear with unrestricted hearing of right ear          The patient and/or caregiver is to notify the office if no improvement or worsening of symptoms is noted prior to the scheduled follow-up for sooner evaluation. The patient and/or caregiver is able to state an understanding of these recommendations and is agreeable to the treatment plan.

## 2020-03-11 NOTE — PROGRESS NOTES
Review of Systems   Constitutional: Negative for activity change, appetite change, chills, diaphoresis, fatigue, fever and unexpected weight change. HENT: Positive for congestion, ear discharge, ear pain, hearing loss (left ear), rhinorrhea, sinus pressure, sinus pain and sneezing. Negative for dental problem, drooling, facial swelling, mouth sores, nosebleeds, postnasal drip, sore throat, tinnitus, trouble swallowing and voice change. Eyes: Negative for photophobia, pain, discharge, redness, itching and visual disturbance. Respiratory: Negative for apnea, cough, choking, chest tightness, shortness of breath, wheezing and stridor. Cardiovascular: Negative for chest pain, palpitations and leg swelling. Gastrointestinal: Positive for nausea. Negative for abdominal distention, abdominal pain, anal bleeding, blood in stool, constipation, diarrhea, rectal pain and vomiting. Endocrine: Negative for cold intolerance, heat intolerance, polydipsia, polyphagia and polyuria. Genitourinary: Negative for decreased urine volume, difficulty urinating, discharge, dysuria, enuresis, flank pain, frequency, genital sores, hematuria, penile pain, penile swelling, scrotal swelling, testicular pain and urgency. Musculoskeletal: Positive for neck stiffness (left). Negative for arthralgias, back pain, gait problem, joint swelling, myalgias and neck pain. Skin: Negative for color change, pallor, rash and wound. Allergic/Immunologic: Negative for environmental allergies, food allergies and immunocompromised state. Neurological: Positive for dizziness (occasional). Negative for tremors, seizures, syncope, facial asymmetry, speech difficulty, weakness, light-headedness, numbness and headaches. Hematological: Negative for adenopathy. Does not bruise/bleed easily.    Psychiatric/Behavioral: Negative for agitation, behavioral problems, confusion, decreased concentration, dysphoric mood, hallucinations, self-injury, sleep

## 2020-10-20 ENCOUNTER — HOSPITAL ENCOUNTER (OUTPATIENT)
Age: 35
Discharge: HOME | End: 2020-10-20
Payer: COMMERCIAL

## 2020-10-20 PROCEDURE — 87081 CULTURE SCREEN ONLY: CPT

## 2020-10-22 LAB
CULTURE: NORMAL
Lab: NORMAL
SPECIMEN DESCRIPTION: NORMAL

## 2020-11-04 ENCOUNTER — OFFICE VISIT (OUTPATIENT)
Dept: ENT CLINIC | Age: 35
End: 2020-11-04
Payer: COMMERCIAL

## 2020-11-04 VITALS
TEMPERATURE: 97.3 F | WEIGHT: 314 LBS | SYSTOLIC BLOOD PRESSURE: 128 MMHG | BODY MASS INDEX: 43.96 KG/M2 | DIASTOLIC BLOOD PRESSURE: 88 MMHG | HEART RATE: 97 BPM | OXYGEN SATURATION: 98 % | HEIGHT: 71 IN

## 2020-11-04 PROCEDURE — 99213 OFFICE O/P EST LOW 20 MIN: CPT | Performed by: OTOLARYNGOLOGY

## 2020-11-04 PROCEDURE — G8427 DOCREV CUR MEDS BY ELIG CLIN: HCPCS | Performed by: OTOLARYNGOLOGY

## 2020-11-04 PROCEDURE — 4004F PT TOBACCO SCREEN RCVD TLK: CPT | Performed by: OTOLARYNGOLOGY

## 2020-11-04 PROCEDURE — G8484 FLU IMMUNIZE NO ADMIN: HCPCS | Performed by: OTOLARYNGOLOGY

## 2020-11-04 PROCEDURE — G8417 CALC BMI ABV UP PARAM F/U: HCPCS | Performed by: OTOLARYNGOLOGY

## 2020-11-04 RX ORDER — CIPROFLOXACIN AND DEXAMETHASONE 3; 1 MG/ML; MG/ML
4 SUSPENSION/ DROPS AURICULAR (OTIC) 2 TIMES DAILY
Qty: 1 BOTTLE | Refills: 0 | Status: SHIPPED | OUTPATIENT
Start: 2020-11-04 | End: 2020-11-14

## 2020-11-04 ASSESSMENT — ENCOUNTER SYMPTOMS
RHINORRHEA: 0
WHEEZING: 0
COUGH: 1
EYE DISCHARGE: 0
TROUBLE SWALLOWING: 0
EYE REDNESS: 0
ABDOMINAL DISTENTION: 0
VOMITING: 0
CONSTIPATION: 0
VOICE CHANGE: 0
BLOOD IN STOOL: 0
EYE ITCHING: 0
FACIAL SWELLING: 0
COLOR CHANGE: 0
CHEST TIGHTNESS: 0
PHOTOPHOBIA: 0
ANAL BLEEDING: 0
EYE PAIN: 0
BACK PAIN: 1
SINUS PAIN: 1
SHORTNESS OF BREATH: 0
SORE THROAT: 0
CHOKING: 0
DIARRHEA: 0
SINUS PRESSURE: 1
STRIDOR: 0
NAUSEA: 0
ABDOMINAL PAIN: 0
APNEA: 0
RECTAL PAIN: 0

## 2020-11-04 NOTE — PATIENT INSTRUCTIONS
SURVEY:    You may be receiving a survey from [employer] regarding your visit today. Please complete the survey to enable us to provide the highest quality of care to you and your family. If you cannot score us a very good on any question, please call the office to discuss how we could have made your experience a very good one. Thank you.

## 2020-11-04 NOTE — PROGRESS NOTES
[facility] [address] [place] [address] [facility]  [address] [facility] [address]  Dept: [phone]  Nilesh Mckeon MD    Miguel Pontiac 28 y.o. male     Patient presents with a chief complaint of Follow-up (Patient returns to discuss rescheduling mastoidectomy and reconstruction of ossicular chain. Surgery was initially scheduled at [facility], due to need of equipment not available at [facility], for Spring of 2020, canceled due to COVID-19. Patient has current c/o left ear pain, along with recovering from cold. Patient has had the cold x 2 weeks, along with left side ear pain/drainage. Patient had COVID testing performed 2 weeks ago which was negative. )       /88 (Site: Right Upper Arm, Position: Sitting, Cuff Size: Large Adult)   Pulse 97   Temp 97.3 °F (36.3 °C) (Infrared)   Ht 5' 11\" (1.803 m)   Wt (!) 314 lb (142.4 kg)   SpO2 98%   BMI 43.79 kg/m²       History of Presenting Illness: The patient/caregiver reports a history of complaint with the following features: Onset:   Hearing loss in left ear x years. Left ear discomfort on and off over last 6 months. Timing:  Gradual onset  Duration:  Hearing loss constant in left ear. Constant left ear pain, soreness type. Intermittent left ear fullness/pressure. Quality:  Hearing loss left ear, off balance, left ear pain  Location:  Left ear  Denies right ear complaints. Severity:   Current level of ear pain moderate. Risk factors/other information:  Ear tubes multiple times during childhood. Can't remember how many. Denies other types of ear surgery. Hx of ear infections. Denies Hx of ear trauma or head trauma. Denies water getting in left ear. Tries to prevent water from getting in ear. Will wear a cotton ball with water exposure. Alleviating factors:  Nothing. No relief with OTC pain relievers. No relief with Ciprodex.   Aggravating factors:  Nothing   Associated factors:  Denies ear drainage. Intermittent left ear fullness/pressure. Lately noticing feeling \"off balance\". Denies spinning sensation. Denies tinnitus/ringing. Review of systems covering 10 systems is reviewed as staff entry in other note and pertinent positives and negatives noted.     Past Medical History:   Diagnosis Date    Bipolar 1 disorder ([facility] [address]     Hypertension     Obesity     Pre-diabetes        Current Outpatient Medications:     Brompheniramine-Phenylephrine (COLD & ALLERGY PO), Take by mouth, Disp: , Rfl:     ciprofloxacin-dexamethasone (CIPRODEX) 0.3-0.1 % otic suspension, Place 4 drops into both ears 2 times daily for 10 days, Disp: 1 Bottle, Rfl: 0   No Known Allergies   Past Surgical History:   Procedure Laterality Date    TESTICLE REMOVAL      TONSILLECTOMY      TYMPANOSTOMY TUBE PLACEMENT      TYMPANOSTOMY TUBE PLACEMENT      UMBILICAL HERNIA REPAIR        Social History     Socioeconomic History    Marital status:      Spouse name: Not on file    Number of children: Not on file    Years of education: Not on file    Highest education level: Not on file   Occupational History    Not on file   Social Needs    Financial resource strain: Not on file    Food insecurity     Worry: Not on file     Inability: Not on file    Transportation needs     Medical: Not on file     Non-medical: Not on file   Tobacco Use    Smoking status: Current Every Day Smoker     Packs/day: 0.50     Types: Cigarettes    Smokeless tobacco: Former User     Types: Snuff, Chew   Substance and Sexual Activity    Alcohol use: No    Drug use: Never    Sexual activity: Not on file   Lifestyle    Physical activity     Days per week: Not on file     Minutes per session: Not on file    Stress: Not on file   Relationships    Social connections     Talks on phone: Not on file     Gets together: Not on file     Attends Jainism service: Not on file     Active member of club or organization: Not on file     Attends meetings of clubs or organizations: Not on file     Relationship status: Not on file    Intimate partner violence     Fear of current or ex partner: Not on file     Emotionally abused: Not on file     Physically abused: Not on file     Forced sexual activity: Not on file   Other Topics Concern    Not on file   Social History Narrative    Not on file     History reviewed. No pertinent family history.      PHYSICAL EXAM:     The patient was examined today 3/11/2020 with findings as follows:     CONSTITUTIONAL:     General Appearance: well-appearing, nontoxic, alert, no acute distress      Communication: understanding at normal conversational tones, normal voicing, speech intelligible     HEAD/FACE:     Head: atraumatic, normocephalic, no lesions     Facial Inspection: no lesions, healthy skin     Facial Strength: motor strength normal, symmetric strength, symmetric movement, motor strength     Sinuses: no sinus tenderness     Salivary Glands: no enlargements of parotid glands, no tenderness of parotid glands, no masses of parotid glands, clear salivary flow on palpation from Stensen's ducts, no duct stones of Stensen's duct, no enlargement of submandibular glands, no tenderness of submandibular glands, no masses of submandibular glands, clear salivary flow from Oneida's ducts, no stones of Sabrina's ducts     Temporomandibular Joint: no crepitus with motion, no tenderness on palpation, no trismus, motion symmetric     EYES:     Pupils: PERRLA, extra-ocular movements intact, no nystagmus, sclera white, no redness of eyes, no watering of eyes     EARS:     Bilateral External Ears: no pits, no tags     Right External Ear: normally formed, no lesions, no mastoid tenderness     Left External Ear: normally formed, no lesions, no mastoid tenderness     Right External Auditory Canal: normal, healthy skin, no obstructing cerumen, no discharge     Left External Auditory Canal: normal, healthy skin, no obstructing cerumen, no discharge     Right Tympanic Membrane: normal landmarks, translucent, mobile to pneumatic otoscopy, no perforation     Left Tympanic Membrane: large perforation with posterior granulation     Hearing: intact to spoken voice, intact to finger rub, Garcia midline, Right Ear: Rinne AC>BC, Left Left Ear: Rinne AC<BC     NECK:     Neck: no masses, trachea midline, normal range of motion, no cysts or pits, no tenderness to palpation     Thyroid: normal thyroid, no enlargement, no tenderness, no nodules     LYMPH NODES:     Cervical: no palpable lymph node enlargement    SKIN:     General Appearance: no lesions, warm and dry, normal turgor, no bruising     NEUROLOGICAL SYSTEM:     Orientation: oriented to time, oriented to place, oriented to person     PSYCHIATRIC:     Mood and affect: normal mood, normal affect      Assessment and Plan:    His granulation tissue has recurred. The use of ototopical agents to reduce this is advised. A large perforation with a white mass highly suggestive of cholesteatoma. CT of the left mastoid reveals extensive opacification of the mastoid and attic again consistent with chronic mastoiditis or cholesteatoma. Given that he has chronic complaints without relief with medical therapy, surgical treatment is again advised with mastoidectomy and reconstruction of the ossicular chain in hopes of improved hearing, although the primary goal is control of disease and discomfort. I remain concerned about the level of COVID spread currently and he does not appear to be at acute risk, but this will eventually require surgical treatment. Diagnosis Orders   1. Cholesteatoma of attic, left     2. Granulation polyp of middle ear, left  ciprofloxacin-dexamethasone (CIPRODEX) 0.3-0.1 % otic suspension   3.  Chronic mastoiditis of left side          The patient and/or caregiver is to notify the office if no improvement or worsening of symptoms is noted prior to the scheduled follow-up for sooner

## 2020-11-04 NOTE — PROGRESS NOTES
Review of Systems   Constitutional: Negative for activity change, appetite change, chills, diaphoresis, fatigue, fever and unexpected weight change. HENT: Positive for congestion, dental problem, drooling, ear discharge (left side), ear pain (left side), hearing loss (left ear), sinus pressure, sinus pain and tinnitus (occasional). Negative for facial swelling, mouth sores, nosebleeds, postnasal drip, rhinorrhea, sneezing, sore throat, trouble swallowing and voice change. Eyes: Negative for photophobia, pain, discharge, redness, itching and visual disturbance. Respiratory: Positive for cough. Negative for apnea, choking, chest tightness, shortness of breath, wheezing and stridor. Cardiovascular: Negative for chest pain, palpitations and leg swelling. Gastrointestinal: Negative for abdominal distention, abdominal pain, anal bleeding, blood in stool, constipation, diarrhea, nausea, rectal pain and vomiting. Endocrine: Negative for cold intolerance, heat intolerance, polydipsia, polyphagia and polyuria. Genitourinary: Negative for decreased urine volume, difficulty urinating, discharge, dysuria, enuresis, flank pain, frequency, genital sores, hematuria, penile pain, penile swelling, scrotal swelling, testicular pain and urgency. Musculoskeletal: Positive for back pain, neck pain and neck stiffness. Negative for arthralgias, gait problem, joint swelling and myalgias. Skin: Negative for color change, pallor, rash and wound. Allergic/Immunologic: Negative for environmental allergies, food allergies and immunocompromised state. Neurological: Negative for dizziness, tremors, seizures, syncope, facial asymmetry, speech difficulty, weakness, light-headedness, numbness and headaches. Hematological: Negative for adenopathy. Does not bruise/bleed easily.    Psychiatric/Behavioral: Negative for agitation, behavioral problems, confusion, decreased concentration, dysphoric mood, hallucinations, self-injury, sleep disturbance and suicidal ideas. The patient is not nervous/anxious and is not hyperactive.

## 2021-03-04 ENCOUNTER — APPOINTMENT (OUTPATIENT)
Dept: CT IMAGING | Age: 36
End: 2021-03-04
Payer: COMMERCIAL

## 2021-03-04 ENCOUNTER — HOSPITAL ENCOUNTER (EMERGENCY)
Age: 36
Discharge: HOME | End: 2021-03-05
Attending: INTERNAL MEDICINE
Payer: COMMERCIAL

## 2021-03-04 DIAGNOSIS — K76.0 HEPATIC STEATOSIS: ICD-10-CM

## 2021-03-04 DIAGNOSIS — I10 ESSENTIAL HYPERTENSION: ICD-10-CM

## 2021-03-04 DIAGNOSIS — N20.0 NEPHROLITHIASIS: ICD-10-CM

## 2021-03-04 DIAGNOSIS — Z72.0 TOBACCO ABUSE: ICD-10-CM

## 2021-03-04 DIAGNOSIS — R10.9 LEFT FLANK PAIN: Primary | ICD-10-CM

## 2021-03-04 LAB
BILIRUBIN URINE: NEGATIVE
COLOR: YELLOW
COMMENT UA: NORMAL
GLUCOSE URINE: NEGATIVE
KETONES, URINE: NEGATIVE
LEUKOCYTE ESTERASE, URINE: NEGATIVE
NITRITE, URINE: NEGATIVE
PH UA: 5 (ref 5–8)
PROTEIN UA: NEGATIVE
SPECIFIC GRAVITY UA: 1.02 (ref 1–1.03)
TURBIDITY: CLEAR
URINE HGB: NEGATIVE
UROBILINOGEN, URINE: NORMAL

## 2021-03-04 PROCEDURE — 6360000002 HC RX W HCPCS: Performed by: INTERNAL MEDICINE

## 2021-03-04 PROCEDURE — 80048 BASIC METABOLIC PNL TOTAL CA: CPT

## 2021-03-04 PROCEDURE — 85025 COMPLETE CBC W/AUTO DIFF WBC: CPT

## 2021-03-04 PROCEDURE — 74176 CT ABD & PELVIS W/O CONTRAST: CPT

## 2021-03-04 PROCEDURE — 2580000003 HC RX 258: Performed by: INTERNAL MEDICINE

## 2021-03-04 PROCEDURE — 96375 TX/PRO/DX INJ NEW DRUG ADDON: CPT

## 2021-03-04 PROCEDURE — 99282 EMERGENCY DEPT VISIT SF MDM: CPT

## 2021-03-04 PROCEDURE — 96374 THER/PROPH/DIAG INJ IV PUSH: CPT

## 2021-03-04 PROCEDURE — 81003 URINALYSIS AUTO W/O SCOPE: CPT

## 2021-03-04 RX ORDER — KETOROLAC TROMETHAMINE 30 MG/ML
30 INJECTION, SOLUTION INTRAMUSCULAR; INTRAVENOUS ONCE
Status: COMPLETED | OUTPATIENT
Start: 2021-03-04 | End: 2021-03-04

## 2021-03-04 RX ORDER — ONDANSETRON 2 MG/ML
4 INJECTION INTRAMUSCULAR; INTRAVENOUS ONCE
Status: COMPLETED | OUTPATIENT
Start: 2021-03-04 | End: 2021-03-04

## 2021-03-04 RX ORDER — 0.9 % SODIUM CHLORIDE 0.9 %
1000 INTRAVENOUS SOLUTION INTRAVENOUS ONCE
Status: COMPLETED | OUTPATIENT
Start: 2021-03-04 | End: 2021-03-05

## 2021-03-04 RX ORDER — IBUPROFEN 800 MG/1
800 TABLET ORAL EVERY 8 HOURS PRN
COMMUNITY
End: 2022-05-23

## 2021-03-04 RX ORDER — AMOXICILLIN AND CLAVULANATE POTASSIUM 875; 125 MG/1; MG/1
1 TABLET, FILM COATED ORAL 2 TIMES DAILY
COMMUNITY
End: 2021-04-28 | Stop reason: ALTCHOICE

## 2021-03-04 RX ADMIN — ONDANSETRON 4 MG: 2 INJECTION INTRAMUSCULAR; INTRAVENOUS at 23:50

## 2021-03-04 RX ADMIN — KETOROLAC TROMETHAMINE 30 MG: 30 INJECTION, SOLUTION INTRAMUSCULAR at 23:49

## 2021-03-04 RX ADMIN — SODIUM CHLORIDE 1000 ML: 9 INJECTION, SOLUTION INTRAVENOUS at 23:50

## 2021-03-04 ASSESSMENT — PAIN DESCRIPTION - PAIN TYPE: TYPE: ACUTE PAIN

## 2021-03-04 ASSESSMENT — PAIN DESCRIPTION - ORIENTATION: ORIENTATION: LEFT

## 2021-03-04 ASSESSMENT — PAIN DESCRIPTION - DESCRIPTORS: DESCRIPTORS: THROBBING

## 2021-03-04 ASSESSMENT — PAIN SCALES - GENERAL: PAINLEVEL_OUTOF10: 6

## 2021-03-05 VITALS
SYSTOLIC BLOOD PRESSURE: 149 MMHG | TEMPERATURE: 99 F | OXYGEN SATURATION: 97 % | BODY MASS INDEX: 45.61 KG/M2 | DIASTOLIC BLOOD PRESSURE: 79 MMHG | WEIGHT: 315 LBS | RESPIRATION RATE: 16 BRPM | HEART RATE: 88 BPM

## 2021-03-05 LAB
ABSOLUTE BANDS #: 0.76 K/UL (ref 0–1)
ABSOLUTE EOS #: 0.13 K/UL (ref 0–0.4)
ABSOLUTE IMMATURE GRANULOCYTE: ABNORMAL K/UL (ref 0–0.3)
ABSOLUTE LYMPH #: 2.92 K/UL (ref 1–4.8)
ABSOLUTE MONO #: 0.89 K/UL (ref 0–1)
ANION GAP SERPL CALCULATED.3IONS-SCNC: 11 MMOL/L (ref 9–17)
ATYPICAL LYMPHOCYTE ABSOLUTE COUNT: 0.25 K/UL (ref 0–1)
ATYPICAL LYMPHOCYTES: 2 %
BANDS: 6 % (ref 0–10)
BASOPHILS # BLD: ABNORMAL % (ref 0–2)
BASOPHILS ABSOLUTE: ABNORMAL K/UL (ref 0–0.2)
BUN BLDV-MCNC: 20 MG/DL (ref 6–20)
BUN/CREAT BLD: 25 (ref 9–20)
CALCIUM SERPL-MCNC: 9.7 MG/DL (ref 8.6–10.4)
CHLORIDE BLD-SCNC: 104 MMOL/L (ref 98–107)
CO2: 22 MMOL/L (ref 20–31)
CREAT SERPL-MCNC: 0.8 MG/DL (ref 0.7–1.2)
DIFFERENTIAL TYPE: ABNORMAL
EOSINOPHILS RELATIVE PERCENT: 1 % (ref 0–5)
GFR AFRICAN AMERICAN: >60 ML/MIN
GFR NON-AFRICAN AMERICAN: >60 ML/MIN
GFR SERPL CREATININE-BSD FRML MDRD: ABNORMAL ML/MIN/{1.73_M2}
GFR SERPL CREATININE-BSD FRML MDRD: ABNORMAL ML/MIN/{1.73_M2}
GLUCOSE BLD-MCNC: 109 MG/DL (ref 70–99)
HCT VFR BLD CALC: 41.7 % (ref 41–53)
HEMOGLOBIN: 14.5 G/DL (ref 13.5–17.5)
IMMATURE GRANULOCYTES: ABNORMAL %
LYMPHOCYTES # BLD: 23 % (ref 13–44)
MCH RBC QN AUTO: 32.2 PG (ref 26–34)
MCHC RBC AUTO-ENTMCNC: 34.8 G/DL (ref 31–37)
MCV RBC AUTO: 92.7 FL (ref 80–100)
MONOCYTES # BLD: 7 % (ref 5–9)
MORPHOLOGY: ABNORMAL
NRBC AUTOMATED: ABNORMAL PER 100 WBC
PDW BLD-RTO: 12.5 % (ref 12.1–15.2)
PLATELET # BLD: 202 K/UL (ref 140–450)
PLATELET ESTIMATE: ABNORMAL
PMV BLD AUTO: ABNORMAL FL
POTASSIUM SERPL-SCNC: 4.2 MMOL/L (ref 3.7–5.3)
RBC # BLD: 4.5 M/UL (ref 4.5–5.9)
RBC # BLD: ABNORMAL 10*6/UL
SEG NEUTROPHILS: 61 % (ref 39–75)
SEGMENTED NEUTROPHILS ABSOLUTE COUNT: 7.75 K/UL (ref 2.1–6.5)
SODIUM BLD-SCNC: 137 MMOL/L (ref 135–144)
WBC # BLD: 12.7 K/UL (ref 3.5–11)
WBC # BLD: ABNORMAL 10*3/UL

## 2021-03-05 RX ORDER — CYCLOBENZAPRINE HCL 10 MG
10 TABLET ORAL 2 TIMES DAILY PRN
Qty: 30 TABLET | Refills: 0 | Status: SHIPPED | OUTPATIENT
Start: 2021-03-05 | End: 2021-03-15

## 2021-03-05 NOTE — ED PROVIDER NOTES
[facility] ED  EMERGENCY DEPARTMENT ENCOUNTER      Pt Name: Joe Hassan  MRN: [id number]  [place] 1985  Date of evaluation: 3/4/2021  Provider: Bobo Cramer MD    [address] [place] [address]       Chief Complaint   Patient presents with    Flank Pain     started last Friday. Went and seen his PCP and gave urine sample. Has a CT scan this Tuesday. HISTORY OF PRESENT ILLNESS   (Location/Symptom, Timing/Onset, Context/Setting, Quality, Duration, Modifying Factors, Severity)  Note limiting factors. Joe Hassan is a 28 y.o. male who has a history of bipolar disease, kidney stones, hypertension, obesity, diabetes, morbid obesity, presents to the emergency department for evaluation and management of 1 week of left flank pain. It does not radiate to his abdomen. Nothing nothing makes it better or worse. He was seen by his primary care provider for the same problem and his urine was tested. No evidence of infection. He denies trauma or lifting. He is scheduled for CT scan next week. He tried ibuprofen 800 mg at 5:00 this evening. This patient is being evaluated during the COVID-19 pandemic. HPI    Nursing Notes were reviewed. REVIEW OF SYSTEMS    (2-9 systems for level 4, 10 or more for level 5)       REVIEW OF SYSTEMS    Constitutional: Negative for fatigue and fever. Gastrointestinal: Negative for abdominal distention, abdominal pain, diarrhea, nausea and vomiting. Genitourinary: Negative for difficulty urinating, dysuria, hematuria and urgency. Musculoskeletal: Positive for left flank pain, negative for arthralgias, back pain and neck pain. Skin: Negative for color change, pallor, rash and wound. Except as noted above the remainder of the review of systems was reviewed and negative.        PASTMEDICAL HISTORY     Past Medical History:   Diagnosis Date    Bipolar 1 disorder ([facility] [address]     Hypertension     Kidney stones     Obesity     Pre-diabetes SURGICAL HISTORY       Past Surgical History:   Procedure Laterality Date    TESTICLE REMOVAL      TONSILLECTOMY      TYMPANOSTOMY TUBE PLACEMENT      TYMPANOSTOMY TUBE PLACEMENT      UMBILICAL HERNIA REPAIR           CURRENT MEDICATIONS       Discharge Medication List as of 3/5/2021 12:29 AM      CONTINUE these medications which have NOT CHANGED    Details   amoxicillin-clavulanate (AUGMENTIN) 875-125 MG per tablet Take 1 tablet by mouth 2 times dailyHistorical Med      ibuprofen (ADVIL;MOTRIN) 800 MG tablet Take 800 mg by mouth every 8 hours as needed for PainHistorical Med      Brompheniramine-Phenylephrine (COLD & ALLERGY PO) Take by mouthHistorical Med             ALLERGIES     Patient has no known allergies. FAMILY HISTORY     History reviewed. No pertinent family history.        SOCIAL HISTORY       Social History     Socioeconomic History    Marital status:      Spouse name: None    Number of children: None    Years of education: None    Highest education level: None   Occupational History    None   Social Needs    Financial resource strain: None    Food insecurity     Worry: None     Inability: None    Transportation needs     Medical: None     Non-medical: None   Tobacco Use    Smoking status: Current Every Day Smoker     Packs/day: 0.50     Types: Cigarettes    Smokeless tobacco: Former User     Types: Snuff, Chew   Substance and Sexual Activity    Alcohol use: No    Drug use: Never    Sexual activity: None   Lifestyle    Physical activity     Days per week: None     Minutes per session: None    Stress: None   Relationships    Social connections     Talks on phone: None     Gets together: None     Attends Yarsanism service: None     Active member of club or organization: None     Attends meetings of clubs or organizations: None     Relationship status: None    Intimate partner violence     Fear of current or ex partner: None     Emotionally abused: None Physically abused: None     Forced sexual activity: None   Other Topics Concern    None   Social History Narrative    None       SCREENINGS             PHYSICAL EXAM    (up to 7 for level 4, 8 or more for level 5)     ED Triage Vitals   BP Temp Temp src Pulse Resp SpO2 Height Weight   -- -- -- -- -- -- -- --       Physical Exam  Physical Exam   Constitutional:  Appears well, well-developed and well-nourished. No distress noted. Non toxic in appearance. Morbidly obese. HENT:     Head: Normocephalic and atraumatic. Mouth/Throat: Oropharynx is clear and mucosa moist. No oropharyngeal exudate noted. Posterior pharynx is pink and noninjected. Eyes: Conjunctivae and EOM are normal. Pupils are equal, round, and reactive to light. No scleral icterus. There is no tearing or drainage. Neck: Normal range of motion. Neck supple. No tracheal deviation present. Cardiovascular: Normal rate, regular rhythm, normal heartsounds and intact distal pulses. Exam reveals no gallop or friction rub. No murmur heard. Pulmonary/Chest: Effort normal and breath sounds are symmetric and normal. No respiratory distress. There are no wheezes, rales or rhonchi. No tenderness is exhibited upon palpation of the chest wall. Abdominal: Soft. Bowel sounds are normal. No distension or no mass exhibitted. There is no tenderness, rebound, rigidity or guarding. Genitourinary:   Mild left CVA tenderness noted on examination. No masses or induration discoloration. Musculoskeletal: Normal range of motion. No edema, tenderness or deformity. There is no tenderness on palpation of the spinous processes or step-offs noted. No paraspinous tenderness noted. Lymphadenopathy:  No cervical adenopathy. Neurological:   alert and oriented to person, place, and time. Normal speech, normal comprehension, normal cognition,  Reflexes are normal.  There are no cranial nerve deficits.  Normal muscle tone, motor and sensory function including SILT exhibited. Strength 5/5 in extremities and torso. Coordination normal and gait normal.    Skin: Skin is warm and dry. No rash noted. No diaphoresis. No erythema. No pallor. Psychiatric: Pleasant and cooperative. Normal mood and affect. Behavior is  normal. Judgment and thought content normal.     DIAGNOSTIC RESULTS     EKG: All EKG's are interpreted by the Emergency Department Physician who either signs or Co-signs this chart in the absence of a cardiologist.    Not indicated. RADIOLOGY:   Non-plain film images such as CT, Ultrasoundand MRI are read by the radiologist. Plain radiographic images are visualized and preliminarily interpreted by the emergency physician with the below findings:    Not indicated. Interpretation per the Radiologist below, if available at the time of this note:    CT ABDOMEN PELVIS WO CONTRAST Additional Contrast? None   Final Result         1. Nonobstructive left renal calculus with no ureteral calculus seen. 2. Hepatic steatosis. 3. Normal appendix. ED BEDSIDE ULTRASOUND:   Performed by ED Physician - none    LABS:  Labs Reviewed   CBC WITH AUTO DIFFERENTIAL - Abnormal; Notable for the following components:       Result Value    WBC 12.7 (*)     Segs Absolute 7.75 (*)     All other components within normal limits   BASIC METABOLIC PANEL W/ REFLEX TO MG FOR LOW K - Abnormal; Notable for the following components:    Glucose 109 (*)     Bun/Cre Ratio 25 (*)     All other components within normal limits   URINALYSIS       All other labs were within normal range or not returned as of this dictation.     EMERGENCY DEPARTMENT COURSE and DIFFERENTIAL DIAGNOSIS/MDM:   Vitals:    Vitals:    03/04/21 2352 03/05/21 0003 03/05/21 0019 03/05/21 0031   BP: (!) 153/93 (!) 153/81 (!) 149/79    Pulse:       Resp:       Temp:       TempSrc:       SpO2: 98% 96% 98% 97%   Weight:           Noted    MDM    CRITICAL CARE TIME   Total Critical Care time was 0 minutes EDCOURSE       CONSULTS:  None    PROCEDURES:  Unless otherwise noted below, none     Procedures      Summation    Brien Mejias is a 28 y.o. male who has a history of  bipolar disease, kidney stones, hypertension, obesity, diabetes, morbid obesity, presented with left musculoskeletal flank pain. No evidence of UTI or hematuria for suspicion for kidney stone. He has no evidence of nephrolithiasis and hepatic steatosis but no source of pain otherwise identified    He is well, well hydrated, nontoxic, hemodynamically stable and satisfactory for discharge for outpatient management. Findings discussed at length with patient. I prescribed NSAIDs and muscle relaxants. The patient was evaluated during the global COVID-19  pandemic, and that diagnosis was suspected/considered upon their initial presentation. Their evaluation, treatment and testing was consistent with current guidelines for patients who present with complaints or symptoms that may be related to COVID-19 . The patient did not meet criteria for COVID-19 testing per current protocol. We recommend COVID-19 testing as per current recommendations if symptoms worsen including fever and difficulty breathing and any other concerns or indications should arise. I instructed the patient to followup with the PCP for evaluation of response to management of acute problem, and with the PCP for management of hypertension and tobacco abuse. I instructed the patient to return to the ER if his condition worsens, if there is any concern for altered mental status, difficulty breathing, dehydration or loss of function.         Patient Course:        ED Medicationsadministered this visit:    Medications   0.9 % sodium chloride bolus (0 mLs Intravenous Stopped 3/5/21 0040)   ketorolac (TORADOL) injection 30 mg (30 mg Intravenous Given 3/4/21 2349)   ondansetron (ZOFRAN) injection 4 mg (4 mg Intravenous Given 3/4/21 2770)       New Prescriptions from this visit:    Discharge Medication List as of 3/5/2021 12:29 AM      START taking these medications    Details   cyclobenzaprine (FLEXERIL) 10 MG tablet Take 1 tablet by mouth 2 times daily as needed for Muscle spasms Caution may cause drowsiness. , Disp-30 tablet, R-0Normal             Follow-up:  Maurizio Herman [id number]    Schedule an appointment as soon as possible for a visit in 1 week      [facility]  [address] [id number] [phone]  Go to   As needed, If symptoms worsen        Final Impression:   1. Left flank pain    2. Essential hypertension    3. Nephrolithiasis    4. Hepatic steatosis    5. Tobacco abuse               (Please note that portions of this note werecompleted with a voice recognition program.  Efforts were made to edit the dictations but occasionally words are mis-transcribed.)    FINAL IMPRESSION      1. Left flank pain    2. Essential hypertension    3. Nephrolithiasis    4. Hepatic steatosis    5. Tobacco abuse          DISPOSITION/PLAN   DISPOSITION        PATIENT REFERRED TO:  Maurizio Shipman [facility] [place] [facility] [phone]    Schedule an appointment as soon as possible for a visit in 1 week      [facility]  [address] [id number] [phone]  Go to   As needed, If symptoms worsen      DISCHARGE MEDICATIONS:  Discharge Medication List as of 3/5/2021 12:29 AM      START taking these medications    Details   cyclobenzaprine (FLEXERIL) 10 MG tablet Take 1 tablet by mouth 2 times daily as needed for Muscle spasms Caution may cause drowsiness. , Disp-30 tablet, R-0Normal                (Please note that portions of this note were completed with a voice recognition program.  Efforts were made to edit the dictations but occasionally words are mis-transcribed.)    Wilene Denver, MD (electronically signed)  Attending Emergency Physician         Wilene Denver, MD  03/06/21 5539 Marjorie Galvan MD  03/06/21 9766

## 2021-03-24 ENCOUNTER — OFFICE VISIT (OUTPATIENT)
Dept: ENT CLINIC | Age: 36
End: 2021-03-24
Payer: COMMERCIAL

## 2021-03-24 VITALS
HEART RATE: 88 BPM | OXYGEN SATURATION: 98 % | HEIGHT: 71 IN | DIASTOLIC BLOOD PRESSURE: 88 MMHG | SYSTOLIC BLOOD PRESSURE: 134 MMHG | TEMPERATURE: 98.2 F | BODY MASS INDEX: 43.96 KG/M2 | WEIGHT: 314 LBS

## 2021-03-24 DIAGNOSIS — H70.12 CHRONIC INFLAMMATION OF LEFT MASTOID: ICD-10-CM

## 2021-03-24 DIAGNOSIS — H71.02 CHOLESTEATOMA OF ATTIC, LEFT: ICD-10-CM

## 2021-03-24 DIAGNOSIS — H74.42 GRANULATION POLYP OF MIDDLE EAR, LEFT: Primary | ICD-10-CM

## 2021-03-24 DIAGNOSIS — H90.12 CONDUCTIVE HEARING LOSS OF LEFT EAR WITH UNRESTRICTED HEARING OF RIGHT EAR: ICD-10-CM

## 2021-03-24 PROCEDURE — G8417 CALC BMI ABV UP PARAM F/U: HCPCS | Performed by: OTOLARYNGOLOGY

## 2021-03-24 PROCEDURE — G8484 FLU IMMUNIZE NO ADMIN: HCPCS | Performed by: OTOLARYNGOLOGY

## 2021-03-24 PROCEDURE — G8427 DOCREV CUR MEDS BY ELIG CLIN: HCPCS | Performed by: OTOLARYNGOLOGY

## 2021-03-24 PROCEDURE — 99214 OFFICE O/P EST MOD 30 MIN: CPT | Performed by: OTOLARYNGOLOGY

## 2021-03-24 PROCEDURE — 4004F PT TOBACCO SCREEN RCVD TLK: CPT | Performed by: OTOLARYNGOLOGY

## 2021-03-24 RX ORDER — CIPROFLOXACIN AND DEXAMETHASONE 3; 1 MG/ML; MG/ML
4 SUSPENSION/ DROPS AURICULAR (OTIC) 2 TIMES DAILY
Qty: 1 BOTTLE | Refills: 0 | Status: SHIPPED | OUTPATIENT
Start: 2021-03-24 | End: 2021-03-31

## 2021-03-24 ASSESSMENT — ENCOUNTER SYMPTOMS
STRIDOR: 0
DIARRHEA: 0
BLOOD IN STOOL: 0
ABDOMINAL DISTENTION: 0
EYE DISCHARGE: 0
SINUS PAIN: 0
EYE ITCHING: 0
TROUBLE SWALLOWING: 0
ABDOMINAL PAIN: 0
EYE REDNESS: 0
FACIAL SWELLING: 0
SINUS PRESSURE: 0
PHOTOPHOBIA: 0
VOICE CHANGE: 0
CHOKING: 0
VOMITING: 0
NAUSEA: 0
EYE PAIN: 0
SHORTNESS OF BREATH: 0
CONSTIPATION: 0
APNEA: 0
BACK PAIN: 0
WHEEZING: 0
CHEST TIGHTNESS: 0
ANAL BLEEDING: 0
RECTAL PAIN: 0
COUGH: 1
SORE THROAT: 0
COLOR CHANGE: 0
RHINORRHEA: 0

## 2021-03-24 NOTE — PROGRESS NOTES
hallucinations, self-injury and suicidal ideas. The patient is not nervous/anxious and is not hyperactive.

## 2021-03-24 NOTE — PROGRESS NOTES
[facility] [address] [place] [address] [facility]  [address] [address]  Dept: [phone]  MD Aleksandr Nowakye Pies 28 y.o. male     Patient presents with a chief complaint of Otalgia (Follow up- Chronic Mastoiditis/Cholesteatoma of Left side. He was previously seen in the office on 11/04/2020. Patient is present today with c/o Left side Ear Pain- 6/10, on the pain scale. The pain does come and go. He has been experiencing drainage over the last couple of weeks. He did experience some blood from the Left ear 2 weeks ago. )       /88   Pulse 88   Temp 98.2 °F (36.8 °C) (Infrared)   Ht 5' 11\" (1.803 m)   Wt (!) 314 lb (142.4 kg)   SpO2 98%   BMI 43.79 kg/m²       History of Presenting Illness: The patient/caregiver reports a history of complaint with the following features: Follow-up cholesteatoma of left ear. Onset:  Hearing loss x years in left ear. Left ear pain x 10 months. Timing:  Gradual onset hearing loss  Duration:  Constant hearing loss, but severity can fluctuate. Quality:  Hearing loss, aching left ear pain. Blood from left ear 2 weeks ago noted once. Location:  Left ear  Severity:   Left ear pain that fluctuates from mild to severe. Risk factors:   Ear tubes multiple times during childhood.  Can't remember how many. Denies other types of ear surgery. At prior visit left TM with large perforation and posterior granulation tissue noted. Hx of ear infections. Denies Hx of ear trauma. Denies water getting in left ear. Tries to prevent water from getting in ear. Will wear a cotton ball with water exposure. Alleviating factors:   Tyelnol helps with the pain. No relief with Ciprodex   Aggravating factors:  Nothing  Associated factors:   Denies fever. Intermittent left ear fullness/pressure. Sometimes ringing and sometimes clicking in the left ear.     Review of systems covering 10 systems is reviewed as staff entry in Not on file     Attends meetings of clubs or organizations: Not on file     Relationship status: Not on file    Intimate partner violence     Fear of current or ex partner: Not on file     Emotionally abused: Not on file     Physically abused: Not on file     Forced sexual activity: Not on file   Other Topics Concern    Not on file   Social History Narrative    Not on file     No family history on file.      PHYSICAL EXAM:    The patient was examined today 3/24/2021 with findings as follows:    CONSTITUTIONAL:    General Appearance: well-appearing, nontoxic, alert, no acute distress     Communication: understanding at normal conversational tones, normal voicing, speech intelligible    HEAD/FACE:    Head: atraumatic, normocephalic, no lesions    Facial Inspection: no lesions, healthy skin    Facial Strength: motor strength normal, symmetric strength, symmetric movement, motor strength    Sinuses: no sinus tenderness    Salivary Glands: no enlargements of parotid glands, no tenderness of parotid glands, no masses of parotid glands, clear salivary flow on palpation from Stensen's ducts, no duct stones of Stensen's duct, no enlargement of submandibular glands, no tenderness of submandibular glands, no masses of submandibular glands, clear salivary flow from Voorhees's ducts, no stones of Voorhees's ducts    Temporomandibular Joint: no crepitus with motion, no tenderness on palpation, no trismus, motion symmetric    EYES:    Pupils: PERRLA, extra-ocular movements intact, no nystagmus, sclera white, no redness of eyes, no watering of eyes    EARS:     Bilateral External Ears: no pits, no tags     Right External Ear: normally formed, no lesions, no mastoid tenderness     Left External Ear: normally formed, no lesions, no mastoid tenderness     Right External Auditory Canal: normal, healthy skin, no obstructing cerumen, no discharge     Left External Auditory Canal: normal, healthy skin, no obstructing cerumen, no discharge     Right Tympanic Membrane: normal landmarks, translucent, mobile to pneumatic otoscopy, no perforation     Left Tympanic Membrane: large perforation with posterior granulation     Hearing: intact to spoken voice, intact to finger rub, Garcia midline, Right Ear: Rinne AC>BC, Left Left Ear: Rinne AC<BC    NOSE:    Nasal Skin: no lesions, no lacerations, no scars    Nasal Dorsum: symmetric with no visible or palpable deformities    Nasal Tip: normal symmetric nasal tip, normal nasal valves    Nasal Mucosa: normal, pink and moist    Septum: not markedly deformed, midline, no exposed vessels, no bleeding, no septal granuloma    Turbinates: normal size and conformation    Nasopharynx: normal    ORAL CAVITY/MOUTH:    Lips, teeth, gums: normal lips, normal gums, dentition intact, no dental pain on palpation    Oral Mucosa: normal, moist, no lesions    Palate: normal hard palate, normal soft palate, symmetric palatal elevation    Floor of Mouth: normal floor of mouth    Tongue: normal tongue, no lesions, no edema, no masses, normal mucosa, mobile    Tonsils: normal tonsils, symmetric, no lesions    Posterior pharynx: normal    HYPOPHARYNX/LARYNX:    Hypopharynx: normal hypopharynx, normal tongue base, normal pyriform sinus, normal vallecula    Larynx: normal epiglottis, normal false vocal cords, normal true vocal cords, normal glottic mobility, no arytenoid edema, no post-cricoid edema, no subglottic stenosis    NECK:    Neck: no masses, trachea midline, normal range of motion, no cysts or pits, no tenderness to palpation    Thyroid: normal thyroid, no enlargement, no tenderness, no nodules    LYMPH NODES:    Cervical: no palpable lymph node enlargement    RESPIRATORY:    Inspection/Auscultation: good air movement, chest expands symmetrically, normal breath sounds, no wheezing, no stridor    CARDIOVASCULAR SYSTEM:    Auscultation: regular rate and rhythm, carotid pulse normal, no carotid thrills, no carotid bruits    Observation/Palpation of Peripheral Vascular System: no varicosities, no cyanosis, no edema    SKIN:    General Appearance: no lesions, warm and dry, normal turgor, no bruising    NEUROLOGICAL SYSTEM:    Orientation: oriented to time, oriented to place, oriented to person    Cranial Nerves: Cranial Nerves II-XII intact, normal facial movement    PSYCHIATRIC:    Mood and affect: normal mood, normal affect          Assessment and Plan:    He continues with granulation and drainage with a large perforation of the left tympanic membrane. Prior CT showed complete opacification of the attic and mastoid and given this, surgical treatment with tympanomastoidectomy is offered at this time. Surgical treatment is recommended upon review of the patient's history, clinical presentation, exam, and available testing and laboratory data. The risks, alternatives, potential complications, and benefits of surgery are discussed at length. The surgical procedure, pre-operative preparation, and post-operative course are discussed. Any questions are answered to the patient's and/or caregiver's satisfaction and they are agreeable to proceed. An informational sheet covering this information is also provided. Witnessed informed surgical consent is signed in the office. The patient and/or caregiver is able to state an understanding of these recommendations and is agreeable to the treatment plan. Diagnosis Orders   1. Granulation polyp of middle ear, left  ciprofloxacin-dexamethasone (CIPRODEX) 0.3-0.1 % otic suspension   2. Chronic inflammation of left mastoid     3. Conductive hearing loss of left ear with unrestricted hearing of right ear     4. Cholesteatoma of attic, left        No follow-ups on file. The patient and/or caregiver is to notify the office if no improvement or worsening of symptoms is noted prior to the scheduled follow-up for sooner evaluation.  The patient and/or caregiver is able to state an understanding of these recommendations and is agreeable to the treatment plan. --Michael Calvin MD on 3/24/2021 at 11:23 AM    An electronic signature was used to authenticate this note.

## 2021-03-24 NOTE — PATIENT INSTRUCTIONS
brain, or blood vessels; and need for revision surgery for recurrent disease. Hearing loss may require treatment with additional surgery, treatment with a hearing aid, or not be correctable. Removal of the nerve controlling taste is sometimes a necessary part of surgery. Bleeding is uncommon and is usually limited. A visible scar above or behind the ear is possible. There is a small risk from anesthesia. Alternatives- Some ear surgery is elective, which means that you may choose to have no treatment or to treat the problem with medication or by other means. For some conditions surgery is the only available treatment. Sometimes a decision to not have treatment can have serious consequences that you should discuss with your doctor. Complications- Injury to the nerve controlling facial movement resulting in loss of facial movement on one half of the face, complete hearing loss not treatable with a hearing aid, injury to the balance organ with severe dizziness, injury to the brain or brain lining, and injury to the blood vessels are serious complications of ear surgery. All of these complications are rare, but may require additional treatment. Benefits- Most middle ear surgery is without severe complications and the success in relieving most conditions is excellent with significant reduction in the frequency of ear infection, reduced pain, drainage or odor, and stable or improved hearing. RECOVERY- What should I expect? Most patients have a rapid recovery and can resume normal activities later the same day as surgery after a short observation period. If nausea or vomiting is severe, an overnight stay in the hospital may be needed. Once you have gone home, common events in the recovery period and expectations of what is normal are listed below. Call your doctor if you have any questions or concerns that are not answered here.   Pain- Pain is usually mild to moderate and is an expected part of recovery the surgical incision is fresh, you should avoid any water exposure for at least 48 hours. After that time, a cotton ball coated with petroleum jelly may be placed to keep the ear dry during bathing. After healing is complete, you may purchase sized plugs at your doctors office, or many commercial plugs are available at your favorite pharmacy. The most important factor is a comfortable fit that keeps water from entering the ear canal.   Follow-up- Your doctor will see you for follow-up evaluation the day after surgery and then in approximately two weeks after surgery unless another time has been arranged. Call the office if an appointment has not been previously scheduled. A hearing test to confirm that hearing has returned to normal levels will also be performed when the ears are healthy and healing is complete. Ongoing visits for cleaning of the ear and monitoring for recurrent disease may be needed. NOTICE:  THIS DOCUMENT IS INTENDED SOLELY FOR PATIENT EDUCATIONAL PURPOSES AND IS PROVIDED AS A COURTESY TO PATIENTS OF DR. Bebo Pires MD AND Jona Santana PA-C. IT IS NOT INTENDED AS A SUBSTITUTE FOR PROFESSIONAL MEDICAL CARE OR ADVICE. THE PATIENT SHOULD SEEK ADVICE FROM THE PHYSICIAN IF THERE ARE ANY QUESTIONS ABOUT THE CONTENTS OR DIRECTIONS PROVIDED IN THIS DOCUMENT.

## 2021-04-27 ENCOUNTER — ANESTHESIA EVENT (OUTPATIENT)
Dept: OPERATING ROOM | Age: 36
End: 2021-04-27
Payer: COMMERCIAL

## 2021-04-28 ENCOUNTER — HOSPITAL ENCOUNTER (OUTPATIENT)
Age: 36
Discharge: HOME | End: 2021-04-28
Payer: COMMERCIAL

## 2021-04-28 ENCOUNTER — OFFICE VISIT (OUTPATIENT)
Dept: ENT CLINIC | Age: 36
End: 2021-04-28
Payer: COMMERCIAL

## 2021-04-28 VITALS
WEIGHT: 303 LBS | DIASTOLIC BLOOD PRESSURE: 88 MMHG | RESPIRATION RATE: 20 BRPM | BODY MASS INDEX: 42.42 KG/M2 | TEMPERATURE: 97.8 F | SYSTOLIC BLOOD PRESSURE: 134 MMHG | HEART RATE: 80 BPM | HEIGHT: 71 IN

## 2021-04-28 DIAGNOSIS — H90.12 CONDUCTIVE HEARING LOSS OF LEFT EAR WITH UNRESTRICTED HEARING OF RIGHT EAR: ICD-10-CM

## 2021-04-28 DIAGNOSIS — Z01.818 PREOP TESTING: ICD-10-CM

## 2021-04-28 DIAGNOSIS — H70.12 CHRONIC MASTOIDITIS OF LEFT SIDE: Primary | ICD-10-CM

## 2021-04-28 DIAGNOSIS — Z01.818 PREOP TESTING: Primary | ICD-10-CM

## 2021-04-28 DIAGNOSIS — H71.02 CHOLESTEATOMA OF ATTIC, LEFT: ICD-10-CM

## 2021-04-28 PROCEDURE — 93005 ELECTROCARDIOGRAM TRACING: CPT

## 2021-04-28 PROCEDURE — G8427 DOCREV CUR MEDS BY ELIG CLIN: HCPCS | Performed by: OTOLARYNGOLOGY

## 2021-04-28 PROCEDURE — 4004F PT TOBACCO SCREEN RCVD TLK: CPT | Performed by: OTOLARYNGOLOGY

## 2021-04-28 PROCEDURE — 99214 OFFICE O/P EST MOD 30 MIN: CPT | Performed by: OTOLARYNGOLOGY

## 2021-04-28 PROCEDURE — G8417 CALC BMI ABV UP PARAM F/U: HCPCS | Performed by: OTOLARYNGOLOGY

## 2021-04-28 RX ORDER — SODIUM CHLORIDE 0.9 % (FLUSH) 0.9 %
10 SYRINGE (ML) INJECTION EVERY 12 HOURS SCHEDULED
Status: CANCELLED | OUTPATIENT
Start: 2021-04-28

## 2021-04-28 RX ORDER — SODIUM CHLORIDE 9 MG/ML
25 INJECTION, SOLUTION INTRAVENOUS PRN
Status: CANCELLED | OUTPATIENT
Start: 2021-04-28

## 2021-04-28 RX ORDER — SODIUM CHLORIDE 0.9 % (FLUSH) 0.9 %
10 SYRINGE (ML) INJECTION PRN
Status: CANCELLED | OUTPATIENT
Start: 2021-04-28

## 2021-04-28 ASSESSMENT — ENCOUNTER SYMPTOMS
VOICE CHANGE: 0
CHOKING: 0
SORE THROAT: 0
EYE REDNESS: 0
BLOOD IN STOOL: 0
SHORTNESS OF BREATH: 0
BACK PAIN: 0
COLOR CHANGE: 0
ABDOMINAL DISTENTION: 0
STRIDOR: 0
NAUSEA: 0
APNEA: 0
COUGH: 0
DIARRHEA: 0
PHOTOPHOBIA: 0
ANAL BLEEDING: 0
TROUBLE SWALLOWING: 0
RHINORRHEA: 0
RECTAL PAIN: 0
EYE DISCHARGE: 0
VOMITING: 0
WHEEZING: 0
EYE ITCHING: 0
FACIAL SWELLING: 0
CONSTIPATION: 0
EYE PAIN: 0
SINUS PRESSURE: 0
ABDOMINAL PAIN: 0
CHEST TIGHTNESS: 0
SINUS PAIN: 0

## 2021-04-28 NOTE — PROGRESS NOTES
[facility] [address] [place] [address] [facility]  [address] [facility] [address]  Dept: [phone]  MD Silviano Francoshaun Jesus 28 y.o. male     Patient presents with a chief complaint of Follow-up (Patient returns for follow-up to update H&P and consent for tympanomastoidectomy scheduled 05/04/21.)       /88 (Site: Right Upper Arm, Position: Sitting, Cuff Size: Large Adult)   Pulse 80   Temp 97.8 °F (36.6 °C) (Infrared)   Resp 20   Ht 5' 11\" (1.803 m)   Wt (!) 303 lb (137.4 kg)   BMI 42.26 kg/m²       History of Presenting Illness: The patient/caregiver reports a history of complaint with the following features: Follow-up cholesteatoma of left ear. Onset:  Hearing loss x years in left ear. Left ear pain x 10 months. Timing:  Gradual onset hearing loss  Duration:  Constant hearing loss, but severity can fluctuate. Quality:  Hearing loss, aching left ear pain. Location:  Left ear  Severity:   Left ear pain that fluctuates from mild to severe. Risk factors:   Ear tubes multiple times during childhood. At prior visit left TM with large perforation and posterior granulation tissue noted. Hx of ear infections. Tyelnol helps with the pain. Aggravating factors:  Nothing  Associated factors:   Denies fever. Intermittent left ear fullness/pressure. Sometimes ringing and sometimes clicking in the left ear. Review of systems covering 10 systems is reviewed as staff entry in other note and pertinent positives and negatives noted.     Past Medical History:   Diagnosis Date    Bipolar 1 disorder ([address]     Hypertension     Kidney stones     Obesity     Pre-diabetes        Current Outpatient Medications:     ibuprofen (ADVIL;MOTRIN) 800 MG tablet, Take 800 mg by mouth every 8 hours as needed for Pain, Disp: , Rfl:     Brompheniramine-Phenylephrine (COLD & ALLERGY PO), Take by mouth, Disp: , Rfl:    No Known Allergies   Past Surgical History: Procedure Laterality Date    TESTICLE REMOVAL      TONSILLECTOMY      TYMPANOSTOMY TUBE PLACEMENT      TYMPANOSTOMY TUBE PLACEMENT      UMBILICAL HERNIA REPAIR        Social History     Socioeconomic History    Marital status:      Spouse name: Not on file    Number of children: Not on file    Years of education: Not on file    Highest education level: Not on file   Occupational History    Not on file   Social Needs    Financial resource strain: Not on file    Food insecurity     Worry: Not on file     Inability: Not on file    Transportation needs     Medical: Not on file     Non-medical: Not on file   Tobacco Use    Smoking status: Current Every Day Smoker     Packs/day: 0.50     Types: Cigarettes    Smokeless tobacco: Former User     Types: Snuff, Chew   Substance and Sexual Activity    Alcohol use: No    Drug use: Never    Sexual activity: Not on file   Lifestyle    Physical activity     Days per week: Not on file     Minutes per session: Not on file    Stress: Not on file   Relationships    Social connections     Talks on phone: Not on file     Gets together: Not on file     Attends [employer] service: Not on file     Active member of club or organization: Not on file     Attends meetings of clubs or organizations: Not on file     Relationship status: Not on file    Intimate partner violence     Fear of current or ex partner: Not on file     Emotionally abused: Not on file     Physically abused: Not on file     Forced sexual activity: Not on file   Other Topics Concern    Not on file   Social History Narrative    Not on file     History reviewed. No pertinent family history.      PHYSICAL EXAM:    The patient was examined today 4/28/2021 with findings as follows:    CONSTITUTIONAL:    General Appearance: well-appearing, nontoxic, alert, no acute distress     Communication: understanding at normal conversational tones, normal voicing, speech intelligible    HEAD/FACE:    Head: atraumatic, normocephalic, no lesions    Facial Inspection: no lesions, healthy skin    Facial Strength: motor strength normal, symmetric strength, symmetric movement, motor strength    Sinuses: no sinus tenderness    Salivary Glands: no enlargements of parotid glands, no tenderness of parotid glands, no masses of parotid glands, clear salivary flow on palpation from Stensen's ducts, no duct stones of Stensen's duct, no enlargement of submandibular glands, no tenderness of submandibular glands, no masses of submandibular glands, clear salivary flow from Sierra's ducts, no stones of Sierra's ducts    Temporomandibular Joint: no crepitus with motion, no tenderness on palpation, no trismus, motion symmetric    EYES:    Pupils: PERRLA, extra-ocular movements intact, no nystagmus, sclera white, no redness of eyes, no watering of eyes    EARS:     Bilateral External Ears: no pits, no tags     Right External Ear: normally formed, no lesions, no mastoid tenderness     Left External Ear: normally formed, no lesions, no mastoid tenderness     Right External Auditory Canal: normal, healthy skin, no obstructing cerumen, no discharge     Left External Auditory Canal: normal, healthy skin, no obstructing cerumen, no discharge     Right Tympanic Membrane: normal landmarks, translucent, mobile to pneumatic otoscopy, no perforation     Left Tympanic Membrane: large perforation with improved granulation     Hearing: intact to spoken voice, intact to finger rub, Garcia midline, Right Ear: Rinne AC>BC, Left Left Ear: Rinne AC<BC    NOSE:    Nasal Skin: no lesions, no lacerations, no scars    Nasal Dorsum: symmetric with no visible or palpable deformities    Nasal Tip: normal symmetric nasal tip, normal nasal valves    Nasal Mucosa: normal, pink and moist    Septum: not markedly deformed, midline, no exposed vessels, no bleeding, no septal granuloma    Turbinates: normal size and conformation    Nasopharynx: normal    ORAL CAVITY/MOUTH:    Lips, teeth, gums: normal lips, normal gums, dentition intact, no dental pain on palpation    Oral Mucosa: normal, moist, no lesions    Palate: normal hard palate, normal soft palate, symmetric palatal elevation    Floor of Mouth: normal floor of mouth    Tongue: normal tongue, no lesions, no edema, no masses, normal mucosa, mobile    Tonsils: normal tonsils, symmetric, no lesions    Posterior pharynx: normal    HYPOPHARYNX/LARYNX:    Hypopharynx: normal hypopharynx, normal tongue base, normal pyriform sinus, normal vallecula    Larynx: normal epiglottis, normal false vocal cords, normal true vocal cords, normal glottic mobility, no arytenoid edema, no post-cricoid edema, no subglottic stenosis    NECK:    Neck: no masses, trachea midline, normal range of motion, no cysts or pits, no tenderness to palpation    Thyroid: normal thyroid, no enlargement, no tenderness, no nodules    LYMPH NODES:    Cervical: no palpable lymph node enlargement    RESPIRATORY:    Inspection/Auscultation: good air movement, chest expands symmetrically, normal breath sounds, no wheezing, no stridor    CARDIOVASCULAR SYSTEM:    Auscultation: regular rate and rhythm, carotid pulse normal, no carotid thrills, no carotid bruits    Observation/Palpation of Peripheral Vascular System: no varicosities, no cyanosis, no edema    SKIN:    General Appearance: no lesions, warm and dry, normal turgor, no bruising    NEUROLOGICAL SYSTEM:    Orientation: oriented to time, oriented to place, oriented to person    Cranial Nerves: Cranial Nerves II-XII intact, normal facial movement    PSYCHIATRIC:    Mood and affect: normal mood, normal affect          Assessment and Plan:    He has had interval improvement in the granulation and ongoing drainage with a large perforation of the left tympanic membrane.   Prior CT showed complete opacification of the attic and mastoid and given this, surgical treatment with tympanomastoidectomy is offered at this time. Surgical treatment is recommended upon review of the patient's history, clinical presentation, exam, and available testing and laboratory data. The risks, alternatives, potential complications, and benefits of surgery are discussed at length. The surgical procedure, pre-operative preparation, and post-operative course are discussed. Any questions are answered to the patient's and/or caregiver's satisfaction and they are agreeable to proceed. An informational sheet covering this information is also provided. Witnessed informed surgical consent is signed in the office. The patient and/or caregiver is able to state an understanding of these recommendations and is agreeable to the treatment plan. Diagnosis Orders   1. Chronic mastoiditis of left side     2. Cholesteatoma of attic, left     3. Conductive hearing loss of left ear with unrestricted hearing of right ear        No follow-ups on file. The patient and/or caregiver is to notify the office if no improvement or worsening of symptoms is noted prior to the scheduled follow-up for sooner evaluation. The patient and/or caregiver is able to state an understanding of these recommendations and is agreeable to the treatment plan. --Terrell Monson MD on 4/28/2021 at 9:40 AM    An electronic signature was used to authenticate this note.

## 2021-04-28 NOTE — H&P
[facility] [address] [place] [address] [facility]  [address] [facility] [address]  Dept: [phone]  MD Bravo Martete Earl 28 y.o. male     Patient presents with a chief complaint of Follow-up (Patient returns for follow-up to update H&P and consent for tympanomastoidectomy scheduled 05/04/21.)       /88 (Site: Right Upper Arm, Position: Sitting, Cuff Size: Large Adult)   Pulse 80   Temp 97.8 °F (36.6 °C) (Infrared)   Resp 20   Ht 5' 11\" (1.803 m)   Wt (!) 303 lb (137.4 kg)   BMI 42.26 kg/m²       History of Presenting Illness: The patient/caregiver reports a history of complaint with the following features: Follow-up cholesteatoma of left ear. Onset:  Hearing loss x years in left ear. Left ear pain x 10 months. Timing:  Gradual onset hearing loss  Duration:  Constant hearing loss, but severity can fluctuate. Quality:  Hearing loss, aching left ear pain. Location:  Left ear  Severity:   Left ear pain that fluctuates from mild to severe. Risk factors:   Ear tubes multiple times during childhood. At prior visit left TM with large perforation and posterior granulation tissue noted. Hx of ear infections. Tyelnol helps with the pain. Aggravating factors:  Nothing  Associated factors:   Denies fever. Intermittent left ear fullness/pressure. Sometimes ringing and sometimes clicking in the left ear. Review of systems covering 10 systems is reviewed as staff entry in other note and pertinent positives and negatives noted.     Past Medical History:   Diagnosis Date    Bipolar 1 disorder ([address]     Hypertension     Kidney stones     Obesity     Pre-diabetes        Current Outpatient Medications:     ibuprofen (ADVIL;MOTRIN) 800 MG tablet, Take 800 mg by mouth every 8 hours as needed for Pain, Disp: , Rfl:     Brompheniramine-Phenylephrine (COLD & ALLERGY PO), Take by mouth, Disp: , Rfl:    No Known Allergies   Past Surgical History: atraumatic, normocephalic, no lesions    Facial Inspection: no lesions, healthy skin    Facial Strength: motor strength normal, symmetric strength, symmetric movement, motor strength    Sinuses: no sinus tenderness    Salivary Glands: no enlargements of parotid glands, no tenderness of parotid glands, no masses of parotid glands, clear salivary flow on palpation from Stensen's ducts, no duct stones of Stensen's duct, no enlargement of submandibular glands, no tenderness of submandibular glands, no masses of submandibular glands, clear salivary flow from Carson's ducts, no stones of Carson's ducts    Temporomandibular Joint: no crepitus with motion, no tenderness on palpation, no trismus, motion symmetric    EYES:    Pupils: PERRLA, extra-ocular movements intact, no nystagmus, sclera white, no redness of eyes, no watering of eyes    EARS:     Bilateral External Ears: no pits, no tags     Right External Ear: normally formed, no lesions, no mastoid tenderness     Left External Ear: normally formed, no lesions, no mastoid tenderness     Right External Auditory Canal: normal, healthy skin, no obstructing cerumen, no discharge     Left External Auditory Canal: normal, healthy skin, no obstructing cerumen, no discharge     Right Tympanic Membrane: normal landmarks, translucent, mobile to pneumatic otoscopy, no perforation     Left Tympanic Membrane: large perforation with improved granulation     Hearing: intact to spoken voice, intact to finger rub, Garcia midline, Right Ear: Rinne AC>BC, Left Left Ear: Rinne AC<BC    NOSE:    Nasal Skin: no lesions, no lacerations, no scars    Nasal Dorsum: symmetric with no visible or palpable deformities    Nasal Tip: normal symmetric nasal tip, normal nasal valves    Nasal Mucosa: normal, pink and moist    Septum: not markedly deformed, midline, no exposed vessels, no bleeding, no septal granuloma    Turbinates: normal size and conformation    Nasopharynx: normal    ORAL CAVITY/MOUTH:    Lips, teeth, gums: normal lips, normal gums, dentition intact, no dental pain on palpation    Oral Mucosa: normal, moist, no lesions    Palate: normal hard palate, normal soft palate, symmetric palatal elevation    Floor of Mouth: normal floor of mouth    Tongue: normal tongue, no lesions, no edema, no masses, normal mucosa, mobile    Tonsils: normal tonsils, symmetric, no lesions    Posterior pharynx: normal    HYPOPHARYNX/LARYNX:    Hypopharynx: normal hypopharynx, normal tongue base, normal pyriform sinus, normal vallecula    Larynx: normal epiglottis, normal false vocal cords, normal true vocal cords, normal glottic mobility, no arytenoid edema, no post-cricoid edema, no subglottic stenosis    NECK:    Neck: no masses, trachea midline, normal range of motion, no cysts or pits, no tenderness to palpation    Thyroid: normal thyroid, no enlargement, no tenderness, no nodules    LYMPH NODES:    Cervical: no palpable lymph node enlargement    RESPIRATORY:    Inspection/Auscultation: good air movement, chest expands symmetrically, normal breath sounds, no wheezing, no stridor    CARDIOVASCULAR SYSTEM:    Auscultation: regular rate and rhythm, carotid pulse normal, no carotid thrills, no carotid bruits    Observation/Palpation of Peripheral Vascular System: no varicosities, no cyanosis, no edema    SKIN:    General Appearance: no lesions, warm and dry, normal turgor, no bruising    NEUROLOGICAL SYSTEM:    Orientation: oriented to time, oriented to place, oriented to person    Cranial Nerves: Cranial Nerves II-XII intact, normal facial movement    PSYCHIATRIC:    Mood and affect: normal mood, normal affect          Assessment and Plan:    He has had interval improvement in the granulation and ongoing drainage with a large perforation of the left tympanic membrane.   Prior CT showed complete opacification of the attic and mastoid and given this, surgical treatment with tympanomastoidectomy is offered at this time. Surgical treatment is recommended upon review of the patient's history, clinical presentation, exam, and available testing and laboratory data. The risks, alternatives, potential complications, and benefits of surgery are discussed at length. The surgical procedure, pre-operative preparation, and post-operative course are discussed. Any questions are answered to the patient's and/or caregiver's satisfaction and they are agreeable to proceed. An informational sheet covering this information is also provided. Witnessed informed surgical consent is signed in the office. The patient and/or caregiver is able to state an understanding of these recommendations and is agreeable to the treatment plan. Diagnosis Orders   1. Chronic mastoiditis of left side     2. Cholesteatoma of attic, left     3. Conductive hearing loss of left ear with unrestricted hearing of right ear        No follow-ups on file. The patient and/or caregiver is to notify the office if no improvement or worsening of symptoms is noted prior to the scheduled follow-up for sooner evaluation. The patient and/or caregiver is able to state an understanding of these recommendations and is agreeable to the treatment plan. --Aydee Gunn MD on 4/28/2021 at 9:42 AM    An electronic signature was used to authenticate this note.

## 2021-04-28 NOTE — PATIENT INSTRUCTIONS
MIDDLE EAR SURGERY     The ear is a complex organ that allows for hearing and balance. Disease of the middle ear may result in serious impairment of the hearing and balance functions of the ear, as well as cause pain, drainage, and risk of life threatening infection of the brain, skull bones, and blood vessels. Common problems of the ear include perforation (hole) of the eardrum, cholesteatoma (skin cyst behind the eardrum), chronic infection with pain, drainage and odor, and disease of the ear bones. Surgery may be necessary to return the ear to health, or reduce the risk of infection. Hearing loss may sometimes not be correctable or may occur as a result of the underlying disease or result from surgery. This may be secondarily treated after resolution of the primary condition requiring surgery. REASONS FOR SURGERY- How is the decision made? Surgery of the eardrum and middle ear is a decision to be made between the surgeon and the patient or family. Perforation (hole) in the eardrum that fails to heal on its own  Cholesteatoma (skin cyst behind the eardrum)  Chronic ear drainage, bleeding, pain, or odor  Significant hearing loss due to abnormality of the ear bones, eardrum, or middle ear space  Other problems as discussed with your doctor    SURGICAL TREATMENT- What are the Risks, Alternatives, Potential Complications, and Benefits? Risks- The risks of ear surgery include: hearing loss; injury to the ear canal, eardrum, or middle ear; injury to the nerve controlling facial movement; injury to the nerve of taste sensation; injury to the balance organ, brain, or blood vessels; and need for revision surgery for recurrent disease. Hearing loss may require treatment with additional surgery, treatment with a hearing aid, or not be correctable. Removal of the nerve controlling taste is sometimes a necessary part of surgery. Bleeding is uncommon and is usually limited.  A visible scar above or behind the ear is possible. There is a small risk from anesthesia. Alternatives- Some ear surgery is elective, which means that you may choose to have no treatment or to treat the problem with medication or by other means. For some conditions surgery is the only available treatment. Sometimes a decision to not have treatment can have serious consequences that you should discuss with your doctor. Complications- Injury to the nerve controlling facial movement resulting in loss of facial movement on one half of the face, complete hearing loss not treatable with a hearing aid, injury to the balance organ with severe dizziness, injury to the brain or brain lining, and injury to the blood vessels are serious complications of ear surgery. All of these complications are rare, but may require additional treatment. Benefits- Most middle ear surgery is without severe complications and the success in relieving most conditions is excellent with significant reduction in the frequency of ear infection, reduced pain, drainage or odor, and stable or improved hearing. RECOVERY- What should I expect? Most patients have a rapid recovery and can resume normal activities later the same day as surgery after a short observation period. If nausea or vomiting is severe, an overnight stay in the hospital may be needed. Once you have gone home, common events in the recovery period and expectations of what is normal are listed below. Call your doctor if you have any questions or concerns that are not answered here. Pain- Pain is usually mild to moderate and is an expected part of recovery after surgery. Over the counter medications such as Tylenol?, Aspirin?, Advil?, Motrin?, Aleve?, and ibuprofen are generally adequate for relief of pain. Take only the medication prescribed by your doctor unless advised otherwise. If pain is severe or worsening, or not relieved by the pain medication taken as prescribed, call your doctor.   Drainage- This may occur for a few days after surgery and should be clear to grey or pink in color. If bright red blood, pus, or foul smelling drainage is noted, call your doctor. Hearing loss-  This results for packing material, swelling, blood, and drainage in the ear from surgery and is usually temporary. Over the next several weeks resolution of this loss is generally expected. A hearing test 6 to 9 weeks after surgery to assess hearing should be expected. Dizziness-  This is not uncommon after surgery on the ear and will subside after a few days. If symptoms are severe, or show no improvement with healing, notify your doctor. Nausea and vomiting- These are usually due to the effects of anesthesia and should subside in the first day or two. If they continue beyond this, call your doctor. Fever- A low grade temperature of less than 102º F for a few days after surgery is normal.  Notify your doctor for fever above this, or one that persists for greater than 3 days. Eating and drinking- A regular diet is usually well tolerated. Activity- Light activities are permitted, with advancement as tolerated. Do not drive or operate machinery while on narcotic pain medication or if you are feeling dizzy. Keep water from entering the ear with bathing. MEDICATIONS-  Ask your doctor about resuming your home medications. Do not take herbal supplements as these often have blood thinning properties which may increase the risk of bleeding. CONTINUING CARE- What additional care do I need after surgery? Ear plugs- The use of earplugs when around water is recommended. When the surgical incision is fresh, you should avoid any water exposure for at least 48 hours. After that time, a cotton ball coated with petroleum jelly may be placed to keep the ear dry during bathing. After healing is complete, you may purchase sized plugs at your doctors office, or many commercial plugs are available at your favorite pharmacy.   The most important factor is a comfortable fit that keeps water from entering the ear canal.   Follow-up- Your doctor will see you for follow-up evaluation the day after surgery and then in approximately two weeks after surgery unless another time has been arranged. Call the office if an appointment has not been previously scheduled. A hearing test to confirm that hearing has returned to normal levels will also be performed when the ears are healthy and healing is complete. Ongoing visits for cleaning of the ear and monitoring for recurrent disease may be needed. NOTICE:  THIS DOCUMENT IS INTENDED SOLELY FOR PATIENT EDUCATIONAL PURPOSES AND IS PROVIDED AS A COURTESY TO PATIENTS OF DR. Pola Michelle MD AND Akbar Varela PA-C. IT IS NOT INTENDED AS A SUBSTITUTE FOR PROFESSIONAL MEDICAL CARE OR ADVICE. THE PATIENT SHOULD SEEK ADVICE FROM THE PHYSICIAN IF THERE ARE ANY QUESTIONS ABOUT THE CONTENTS OR DIRECTIONS PROVIDED IN THIS DOCUMENT.

## 2021-04-28 NOTE — H&P (VIEW-ONLY)
Procedure Laterality Date    TESTICLE REMOVAL      TONSILLECTOMY      TYMPANOSTOMY TUBE PLACEMENT      TYMPANOSTOMY TUBE PLACEMENT      UMBILICAL HERNIA REPAIR        Social History     Socioeconomic History    Marital status:      Spouse name: Not on file    Number of children: Not on file    Years of education: Not on file    Highest education level: Not on file   Occupational History    Not on file   Social Needs    Financial resource strain: Not on file    Food insecurity     Worry: Not on file     Inability: Not on file    Transportation needs     Medical: Not on file     Non-medical: Not on file   Tobacco Use    Smoking status: Current Every Day Smoker     Packs/day: 0.50     Types: Cigarettes    Smokeless tobacco: Former User     Types: Snuff, Chew   Substance and Sexual Activity    Alcohol use: No    Drug use: Never    Sexual activity: Not on file   Lifestyle    Physical activity     Days per week: Not on file     Minutes per session: Not on file    Stress: Not on file   Relationships    Social connections     Talks on phone: Not on file     Gets together: Not on file     Attends Religion service: Not on file     Active member of club or organization: Not on file     Attends meetings of clubs or organizations: Not on file     Relationship status: Not on file    Intimate partner violence     Fear of current or ex partner: Not on file     Emotionally abused: Not on file     Physically abused: Not on file     Forced sexual activity: Not on file   Other Topics Concern    Not on file   Social History Narrative    Not on file     History reviewed. No pertinent family history.      PHYSICAL EXAM:    The patient was examined today 4/28/2021 with findings as follows:    CONSTITUTIONAL:    General Appearance: well-appearing, nontoxic, alert, no acute distress     Communication: understanding at normal conversational tones, normal voicing, speech intelligible    HEAD/FACE:    Head: atraumatic, normocephalic, no lesions    Facial Inspection: no lesions, healthy skin    Facial Strength: motor strength normal, symmetric strength, symmetric movement, motor strength    Sinuses: no sinus tenderness    Salivary Glands: no enlargements of parotid glands, no tenderness of parotid glands, no masses of parotid glands, clear salivary flow on palpation from Stensen's ducts, no duct stones of Stensen's duct, no enlargement of submandibular glands, no tenderness of submandibular glands, no masses of submandibular glands, clear salivary flow from Parke's ducts, no stones of Parke's ducts    Temporomandibular Joint: no crepitus with motion, no tenderness on palpation, no trismus, motion symmetric    EYES:    Pupils: PERRLA, extra-ocular movements intact, no nystagmus, sclera white, no redness of eyes, no watering of eyes    EARS:     Bilateral External Ears: no pits, no tags     Right External Ear: normally formed, no lesions, no mastoid tenderness     Left External Ear: normally formed, no lesions, no mastoid tenderness     Right External Auditory Canal: normal, healthy skin, no obstructing cerumen, no discharge     Left External Auditory Canal: normal, healthy skin, no obstructing cerumen, no discharge     Right Tympanic Membrane: normal landmarks, translucent, mobile to pneumatic otoscopy, no perforation     Left Tympanic Membrane: large perforation with improved granulation     Hearing: intact to spoken voice, intact to finger rub, Garcia midline, Right Ear: Rinne AC>BC, Left Left Ear: Rinne AC<BC    NOSE:    Nasal Skin: no lesions, no lacerations, no scars    Nasal Dorsum: symmetric with no visible or palpable deformities    Nasal Tip: normal symmetric nasal tip, normal nasal valves    Nasal Mucosa: normal, pink and moist    Septum: not markedly deformed, midline, no exposed vessels, no bleeding, no septal granuloma    Turbinates: normal size and conformation    Nasopharynx: normal    ORAL CAVITY/MOUTH:    Lips, teeth, gums: normal lips, normal gums, dentition intact, no dental pain on palpation    Oral Mucosa: normal, moist, no lesions    Palate: normal hard palate, normal soft palate, symmetric palatal elevation    Floor of Mouth: normal floor of mouth    Tongue: normal tongue, no lesions, no edema, no masses, normal mucosa, mobile    Tonsils: normal tonsils, symmetric, no lesions    Posterior pharynx: normal    HYPOPHARYNX/LARYNX:    Hypopharynx: normal hypopharynx, normal tongue base, normal pyriform sinus, normal vallecula    Larynx: normal epiglottis, normal false vocal cords, normal true vocal cords, normal glottic mobility, no arytenoid edema, no post-cricoid edema, no subglottic stenosis    NECK:    Neck: no masses, trachea midline, normal range of motion, no cysts or pits, no tenderness to palpation    Thyroid: normal thyroid, no enlargement, no tenderness, no nodules    LYMPH NODES:    Cervical: no palpable lymph node enlargement    RESPIRATORY:    Inspection/Auscultation: good air movement, chest expands symmetrically, normal breath sounds, no wheezing, no stridor    CARDIOVASCULAR SYSTEM:    Auscultation: regular rate and rhythm, carotid pulse normal, no carotid thrills, no carotid bruits    Observation/Palpation of Peripheral Vascular System: no varicosities, no cyanosis, no edema    SKIN:    General Appearance: no lesions, warm and dry, normal turgor, no bruising    NEUROLOGICAL SYSTEM:    Orientation: oriented to time, oriented to place, oriented to person    Cranial Nerves: Cranial Nerves II-XII intact, normal facial movement    PSYCHIATRIC:    Mood and affect: normal mood, normal affect          Assessment and Plan:    He has had interval improvement in the granulation and ongoing drainage with a large perforation of the left tympanic membrane.   Prior CT showed complete opacification of the attic and mastoid and given this, surgical treatment with tympanomastoidectomy is offered at this time. Surgical treatment is recommended upon review of the patient's history, clinical presentation, exam, and available testing and laboratory data. The risks, alternatives, potential complications, and benefits of surgery are discussed at length. The surgical procedure, pre-operative preparation, and post-operative course are discussed. Any questions are answered to the patient's and/or caregiver's satisfaction and they are agreeable to proceed. An informational sheet covering this information is also provided. Witnessed informed surgical consent is signed in the office. The patient and/or caregiver is able to state an understanding of these recommendations and is agreeable to the treatment plan. Diagnosis Orders   1. Chronic mastoiditis of left side     2. Cholesteatoma of attic, left     3. Conductive hearing loss of left ear with unrestricted hearing of right ear        No follow-ups on file. The patient and/or caregiver is to notify the office if no improvement or worsening of symptoms is noted prior to the scheduled follow-up for sooner evaluation. The patient and/or caregiver is able to state an understanding of these recommendations and is agreeable to the treatment plan. --Talib Patricio MD on 4/28/2021 at 9:42 AM    An electronic signature was used to authenticate this note.

## 2021-04-28 NOTE — PROGRESS NOTES
Review of Systems   Constitutional: Negative for activity change, appetite change, chills, diaphoresis, fatigue, fever and unexpected weight change. HENT: Positive for ear pain (left) and tinnitus. Negative for congestion, dental problem, drooling, ear discharge (left), facial swelling, hearing loss, mouth sores, nosebleeds, postnasal drip, rhinorrhea, sinus pressure, sinus pain, sneezing, sore throat, trouble swallowing and voice change. Eyes: Negative for photophobia, pain, discharge, redness, itching and visual disturbance. Respiratory: Negative for apnea, cough, choking, chest tightness, shortness of breath, wheezing and stridor. Cardiovascular: Negative for chest pain, palpitations and leg swelling. Gastrointestinal: Negative for abdominal distention, abdominal pain, anal bleeding, blood in stool, constipation, diarrhea, nausea, rectal pain and vomiting. Endocrine: Negative for cold intolerance, heat intolerance, polydipsia, polyphagia and polyuria. Genitourinary: Negative for decreased urine volume, difficulty urinating, discharge, dysuria, enuresis, flank pain, frequency, genital sores, hematuria, penile pain, penile swelling, scrotal swelling, testicular pain and urgency. Musculoskeletal: Negative for arthralgias, back pain, gait problem, joint swelling, myalgias, neck pain and neck stiffness. Skin: Negative for color change, pallor, rash and wound. Allergic/Immunologic: Negative for environmental allergies, food allergies and immunocompromised state. Neurological: Negative for dizziness, tremors, seizures, syncope, facial asymmetry, speech difficulty, weakness, light-headedness, numbness and headaches. Hematological: Negative for adenopathy. Does not bruise/bleed easily. Psychiatric/Behavioral: Negative for agitation, behavioral problems, confusion, decreased concentration, dysphoric mood, hallucinations, self-injury, sleep disturbance and suicidal ideas.  The patient is not nervous/anxious and is not hyperactive.

## 2021-04-29 LAB
EKG ATRIAL RATE: 72 BPM
EKG P AXIS: 37 DEGREES
EKG P-R INTERVAL: 158 MS
EKG Q-T INTERVAL: 406 MS
EKG QRS DURATION: 96 MS
EKG QTC CALCULATION (BAZETT): 444 MS
EKG R AXIS: 19 DEGREES
EKG T AXIS: 58 DEGREES
EKG VENTRICULAR RATE: 72 BPM

## 2021-04-29 PROCEDURE — 93010 ELECTROCARDIOGRAM REPORT: CPT | Performed by: INTERNAL MEDICINE

## 2021-05-04 ENCOUNTER — HOSPITAL ENCOUNTER (OUTPATIENT)
Age: 36
Setting detail: OUTPATIENT SURGERY
Discharge: HOME | End: 2021-05-04
Attending: OTOLARYNGOLOGY | Admitting: OTOLARYNGOLOGY
Payer: COMMERCIAL

## 2021-05-04 ENCOUNTER — ANESTHESIA (OUTPATIENT)
Dept: OPERATING ROOM | Age: 36
End: 2021-05-04
Payer: COMMERCIAL

## 2021-05-04 ENCOUNTER — HOSPITAL ENCOUNTER (OUTPATIENT)
Dept: PREADMISSION TESTING | Age: 36
Setting detail: SPECIMEN
Discharge: HOME | End: 2021-05-04
Payer: COMMERCIAL

## 2021-05-04 VITALS
RESPIRATION RATE: 23 BRPM | OXYGEN SATURATION: 93 % | SYSTOLIC BLOOD PRESSURE: 96 MMHG | DIASTOLIC BLOOD PRESSURE: 46 MMHG | TEMPERATURE: 98.6 F

## 2021-05-04 VITALS
DIASTOLIC BLOOD PRESSURE: 84 MMHG | TEMPERATURE: 97.4 F | BODY MASS INDEX: 43.05 KG/M2 | SYSTOLIC BLOOD PRESSURE: 139 MMHG | WEIGHT: 307.5 LBS | RESPIRATION RATE: 18 BRPM | OXYGEN SATURATION: 98 % | HEIGHT: 71 IN | HEART RATE: 72 BPM

## 2021-05-04 LAB
SARS-COV-2, RAPID: NOT DETECTED
SPECIMEN DESCRIPTION: NORMAL

## 2021-05-04 PROCEDURE — 6370000000 HC RX 637 (ALT 250 FOR IP): Performed by: PHYSICIAN ASSISTANT

## 2021-05-04 PROCEDURE — 3700000000 HC ANESTHESIA ATTENDED CARE: Performed by: OTOLARYNGOLOGY

## 2021-05-04 PROCEDURE — 3700000001 HC ADD 15 MINUTES (ANESTHESIA): Performed by: OTOLARYNGOLOGY

## 2021-05-04 PROCEDURE — 3600000004 HC SURGERY LEVEL 4 BASE: Performed by: OTOLARYNGOLOGY

## 2021-05-04 PROCEDURE — 7100000000 HC PACU RECOVERY - FIRST 15 MIN: Performed by: OTOLARYNGOLOGY

## 2021-05-04 PROCEDURE — 7100000001 HC PACU RECOVERY - ADDTL 15 MIN: Performed by: OTOLARYNGOLOGY

## 2021-05-04 PROCEDURE — 69645 REVISE MIDDLE EAR & MASTOID: CPT | Performed by: OTOLARYNGOLOGY

## 2021-05-04 PROCEDURE — 88305 TISSUE EXAM BY PATHOLOGIST: CPT

## 2021-05-04 PROCEDURE — 3600000014 HC SURGERY LEVEL 4 ADDTL 15MIN: Performed by: OTOLARYNGOLOGY

## 2021-05-04 PROCEDURE — 7100000011 HC PHASE II RECOVERY - ADDTL 15 MIN: Performed by: OTOLARYNGOLOGY

## 2021-05-04 PROCEDURE — 2580000003 HC RX 258: Performed by: NURSE ANESTHETIST, CERTIFIED REGISTERED

## 2021-05-04 PROCEDURE — 2500000003 HC RX 250 WO HCPCS: Performed by: OTOLARYNGOLOGY

## 2021-05-04 PROCEDURE — 7100000010 HC PHASE II RECOVERY - FIRST 15 MIN: Performed by: OTOLARYNGOLOGY

## 2021-05-04 PROCEDURE — C9803 HOPD COVID-19 SPEC COLLECT: HCPCS

## 2021-05-04 PROCEDURE — 2580000003 HC RX 258: Performed by: OTOLARYNGOLOGY

## 2021-05-04 PROCEDURE — 88311 DECALCIFY TISSUE: CPT

## 2021-05-04 PROCEDURE — 2720000010 HC SURG SUPPLY STERILE: Performed by: OTOLARYNGOLOGY

## 2021-05-04 PROCEDURE — 2500000003 HC RX 250 WO HCPCS: Performed by: NURSE ANESTHETIST, CERTIFIED REGISTERED

## 2021-05-04 PROCEDURE — 2709999900 HC NON-CHARGEABLE SUPPLY: Performed by: OTOLARYNGOLOGY

## 2021-05-04 PROCEDURE — 6360000002 HC RX W HCPCS: Performed by: NURSE ANESTHETIST, CERTIFIED REGISTERED

## 2021-05-04 PROCEDURE — 87635 SARS-COV-2 COVID-19 AMP PRB: CPT

## 2021-05-04 RX ORDER — ACETAMINOPHEN 325 MG/1
650 TABLET ORAL EVERY 4 HOURS PRN
Status: DISCONTINUED | OUTPATIENT
Start: 2021-05-04 | End: 2021-05-04 | Stop reason: HOSPADM

## 2021-05-04 RX ORDER — SODIUM CHLORIDE 0.9 % (FLUSH) 0.9 %
5-40 SYRINGE (ML) INJECTION PRN
Status: DISCONTINUED | OUTPATIENT
Start: 2021-05-04 | End: 2021-05-04 | Stop reason: HOSPADM

## 2021-05-04 RX ORDER — ONDANSETRON 2 MG/ML
INJECTION INTRAMUSCULAR; INTRAVENOUS PRN
Status: DISCONTINUED | OUTPATIENT
Start: 2021-05-04 | End: 2021-05-04 | Stop reason: SDUPTHER

## 2021-05-04 RX ORDER — SODIUM CHLORIDE 9 MG/ML
25 INJECTION, SOLUTION INTRAVENOUS PRN
Status: DISCONTINUED | OUTPATIENT
Start: 2021-05-04 | End: 2021-05-04 | Stop reason: HOSPADM

## 2021-05-04 RX ORDER — ACETAMINOPHEN 325 MG/1
650 TABLET ORAL EVERY 6 HOURS PRN
Qty: 1 TABLET | Refills: 0 | COMMUNITY
Start: 2021-05-04 | End: 2022-05-23

## 2021-05-04 RX ORDER — PROPOFOL 10 MG/ML
INJECTION, EMULSION INTRAVENOUS PRN
Status: DISCONTINUED | OUTPATIENT
Start: 2021-05-04 | End: 2021-05-04 | Stop reason: SDUPTHER

## 2021-05-04 RX ORDER — SODIUM CHLORIDE, SODIUM LACTATE, POTASSIUM CHLORIDE, CALCIUM CHLORIDE 600; 310; 30; 20 MG/100ML; MG/100ML; MG/100ML; MG/100ML
INJECTION, SOLUTION INTRAVENOUS CONTINUOUS PRN
Status: DISCONTINUED | OUTPATIENT
Start: 2021-05-04 | End: 2021-05-04 | Stop reason: SDUPTHER

## 2021-05-04 RX ORDER — SUCCINYLCHOLINE/SOD CL,ISO/PF 100 MG/5ML
SYRINGE (ML) INTRAVENOUS PRN
Status: DISCONTINUED | OUTPATIENT
Start: 2021-05-04 | End: 2021-05-04 | Stop reason: SDUPTHER

## 2021-05-04 RX ORDER — SODIUM CHLORIDE 0.9 % (FLUSH) 0.9 %
10 SYRINGE (ML) INJECTION PRN
Status: DISCONTINUED | OUTPATIENT
Start: 2021-05-04 | End: 2021-05-04 | Stop reason: HOSPADM

## 2021-05-04 RX ORDER — IBUPROFEN 200 MG
600 TABLET ORAL EVERY 6 HOURS PRN
Status: DISCONTINUED | OUTPATIENT
Start: 2021-05-04 | End: 2021-05-04 | Stop reason: HOSPADM

## 2021-05-04 RX ORDER — FENTANYL CITRATE 50 UG/ML
INJECTION, SOLUTION INTRAMUSCULAR; INTRAVENOUS PRN
Status: DISCONTINUED | OUTPATIENT
Start: 2021-05-04 | End: 2021-05-04 | Stop reason: SDUPTHER

## 2021-05-04 RX ORDER — MIDAZOLAM HYDROCHLORIDE 2 MG/2ML
INJECTION, SOLUTION INTRAMUSCULAR; INTRAVENOUS PRN
Status: DISCONTINUED | OUTPATIENT
Start: 2021-05-04 | End: 2021-05-04 | Stop reason: SDUPTHER

## 2021-05-04 RX ORDER — DEXAMETHASONE SODIUM PHOSPHATE 10 MG/ML
INJECTION INTRAMUSCULAR; INTRAVENOUS PRN
Status: DISCONTINUED | OUTPATIENT
Start: 2021-05-04 | End: 2021-05-04 | Stop reason: SDUPTHER

## 2021-05-04 RX ORDER — SODIUM CHLORIDE 0.9 % (FLUSH) 0.9 %
10 SYRINGE (ML) INJECTION EVERY 12 HOURS SCHEDULED
Status: DISCONTINUED | OUTPATIENT
Start: 2021-05-04 | End: 2021-05-04 | Stop reason: HOSPADM

## 2021-05-04 RX ORDER — LIDOCAINE HYDROCHLORIDE AND EPINEPHRINE 10; 10 MG/ML; UG/ML
INJECTION, SOLUTION INFILTRATION; PERINEURAL PRN
Status: DISCONTINUED | OUTPATIENT
Start: 2021-05-04 | End: 2021-05-04 | Stop reason: ALTCHOICE

## 2021-05-04 RX ORDER — PROPOFOL 10 MG/ML
INJECTION, EMULSION INTRAVENOUS CONTINUOUS PRN
Status: DISCONTINUED | OUTPATIENT
Start: 2021-05-04 | End: 2021-05-04 | Stop reason: SDUPTHER

## 2021-05-04 RX ORDER — LIDOCAINE HYDROCHLORIDE 10 MG/ML
INJECTION, SOLUTION EPIDURAL; INFILTRATION; INTRACAUDAL; PERINEURAL PRN
Status: DISCONTINUED | OUTPATIENT
Start: 2021-05-04 | End: 2021-05-04 | Stop reason: SDUPTHER

## 2021-05-04 RX ORDER — DEXMEDETOMIDINE HYDROCHLORIDE 100 UG/ML
INJECTION, SOLUTION INTRAVENOUS PRN
Status: DISCONTINUED | OUTPATIENT
Start: 2021-05-04 | End: 2021-05-04 | Stop reason: SDUPTHER

## 2021-05-04 RX ORDER — SODIUM CHLORIDE 0.9 % (FLUSH) 0.9 %
5-40 SYRINGE (ML) INJECTION EVERY 12 HOURS SCHEDULED
Status: DISCONTINUED | OUTPATIENT
Start: 2021-05-04 | End: 2021-05-04 | Stop reason: HOSPADM

## 2021-05-04 RX ADMIN — FENTANYL CITRATE 75 MCG: 50 INJECTION, SOLUTION INTRAMUSCULAR; INTRAVENOUS at 12:30

## 2021-05-04 RX ADMIN — ONDANSETRON 4 MG: 2 INJECTION INTRAMUSCULAR; INTRAVENOUS at 12:30

## 2021-05-04 RX ADMIN — SODIUM CHLORIDE, PRESERVATIVE FREE 10 ML: 5 INJECTION INTRAVENOUS at 08:35

## 2021-05-04 RX ADMIN — FENTANYL CITRATE 25 MCG: 50 INJECTION, SOLUTION INTRAMUSCULAR; INTRAVENOUS at 09:45

## 2021-05-04 RX ADMIN — IBUPROFEN 600 MG: 200 TABLET, FILM COATED ORAL at 15:20

## 2021-05-04 RX ADMIN — PROPOFOL 200 MG: 10 INJECTION, EMULSION INTRAVENOUS at 12:30

## 2021-05-04 RX ADMIN — SODIUM CHLORIDE, POTASSIUM CHLORIDE, SODIUM LACTATE AND CALCIUM CHLORIDE: 600; 310; 30; 20 INJECTION, SOLUTION INTRAVENOUS at 14:01

## 2021-05-04 RX ADMIN — LIDOCAINE HYDROCHLORIDE 5 ML: 10 INJECTION, SOLUTION EPIDURAL; INFILTRATION; INTRACAUDAL; PERINEURAL at 12:30

## 2021-05-04 RX ADMIN — Medication 100 MG: at 12:30

## 2021-05-04 RX ADMIN — DEXMEDETOMIDINE HCL 4 MCG: 100 INJECTION INTRAVENOUS at 09:45

## 2021-05-04 RX ADMIN — DEXAMETHASONE SODIUM PHOSPHATE 10 MG: 10 INJECTION INTRAMUSCULAR; INTRAVENOUS at 12:30

## 2021-05-04 RX ADMIN — SODIUM CHLORIDE, POTASSIUM CHLORIDE, SODIUM LACTATE AND CALCIUM CHLORIDE: 600; 310; 30; 20 INJECTION, SOLUTION INTRAVENOUS at 12:22

## 2021-05-04 RX ADMIN — ACETAMINOPHEN 650 MG: 325 TABLET, FILM COATED ORAL at 15:48

## 2021-05-04 RX ADMIN — PROPOFOL 100 MCG/KG/MIN: 10 INJECTION, EMULSION INTRAVENOUS at 12:30

## 2021-05-04 RX ADMIN — MIDAZOLAM HYDROCHLORIDE 2 MG: 1 INJECTION, SOLUTION INTRAMUSCULAR; INTRAVENOUS at 12:22

## 2021-05-04 ASSESSMENT — PAIN DESCRIPTION - ORIENTATION: ORIENTATION: LEFT

## 2021-05-04 ASSESSMENT — PAIN - FUNCTIONAL ASSESSMENT: PAIN_FUNCTIONAL_ASSESSMENT: 0-10

## 2021-05-04 ASSESSMENT — PAIN DESCRIPTION - DESCRIPTORS: DESCRIPTORS: ACHING

## 2021-05-04 ASSESSMENT — PAIN SCALES - GENERAL
PAINLEVEL_OUTOF10: 10

## 2021-05-04 ASSESSMENT — PAIN DESCRIPTION - FREQUENCY: FREQUENCY: CONTINUOUS

## 2021-05-04 ASSESSMENT — PAIN DESCRIPTION - PAIN TYPE
TYPE: SURGICAL PAIN
TYPE: ACUTE PAIN;SURGICAL PAIN

## 2021-05-04 ASSESSMENT — PAIN DESCRIPTION - LOCATION
LOCATION: EAR
LOCATION: EAR

## 2021-05-04 ASSESSMENT — PAIN DESCRIPTION - ONSET: ONSET: GRADUAL

## 2021-05-04 NOTE — PROGRESS NOTES
Wife at bedside. Pt A&o, remains sleepy but opens eyes spontaneously and follows commands. Able to take in orally sips of water and swallow prn po medications. Viktor tripp

## 2021-05-04 NOTE — PROGRESS NOTES

## 2021-05-04 NOTE — ANESTHESIA PRE PROCEDURE
Department of Anesthesiology  Preprocedure Note       Name:  Bravo Pichardo   Age:  28 y.o.  :  1985                                          MRN:  [id number]         Date:  2021      Surgeon: Daren Viera):  Adriano Blank MD    Procedure: Procedure(s):  TYMPANOMASTOIDECTOMY    Medications prior to admission:   Prior to Admission medications    Medication Sig Start Date End Date Taking? Authorizing Provider   ibuprofen (ADVIL;MOTRIN) 800 MG tablet Take 800 mg by mouth every 8 hours as needed for Pain   Yes Historical Provider, MD   Brompheniramine-Phenylephrine (COLD & ALLERGY PO) Take by mouth    Historical Provider, MD       Current medications:    Current Facility-Administered Medications   Medication Dose Route Frequency Provider Last Rate Last Admin    0.9 % sodium chloride infusion  25 mL Intravenous PRN Adriano Blank MD        sodium chloride flush 0.9 % injection 10 mL  10 mL Intravenous 2 times per day Adriano Blank MD        sodium chloride flush 0.9 % injection 10 mL  10 mL Intravenous PRN Adriano Blank MD   10 mL at 21 8809       Allergies:  No Known Allergies    Problem List:    Patient Active Problem List   Diagnosis Code    Chronic inflammation of left mastoid H70.12    Granulation polyp of middle ear, left H74.42       Past Medical History:        Diagnosis Date    Bipolar 1 disorder ([facility] [address]     Hypertension     Kidney stones     Obesity     Pre-diabetes        Past Surgical History:        Procedure Laterality Date    TESTICLE REMOVAL      TONSILLECTOMY      TYMPANOSTOMY TUBE PLACEMENT      TYMPANOSTOMY TUBE PLACEMENT      UMBILICAL HERNIA REPAIR         Social History:    Social History     Tobacco Use    Smoking status: Current Every Day Smoker     Packs/day: 0.50     Types: Cigarettes    Smokeless tobacco: Former User     Types: Snuff, Chew   Substance Use Topics    Alcohol use:  No                                Ready to quit: Not Answered  Counseling given: Not Answered      Vital Signs (Current):   Vitals:    05/04/21 0811 05/04/21 0814   BP:  (!) 146/88   Pulse:  71   Resp:  18   Temp:  36.6 °C (97.9 °F)   TempSrc:  Temporal   SpO2:  99%   Weight: (!) 307 lb 8 oz (139.5 kg)    Height: 5' 11\" (1.803 m)                                               BP Readings from Last 3 Encounters:   05/04/21 (!) 146/88   04/28/21 134/88   03/24/21 134/88       NPO Status: Time of last liquid consumption: 2100                        Time of last solid consumption: 2100                        Date of last liquid consumption: 05/03/21                        Date of last solid food consumption: 05/03/21    BMI:   Wt Readings from Last 3 Encounters:   05/04/21 (!) 307 lb 8 oz (139.5 kg)   04/28/21 (!) 303 lb (137.4 kg)   03/24/21 (!) 314 lb (142.4 kg)     Body mass index is 42.89 kg/m². CBC:   Lab Results   Component Value Date    WBC 12.7 03/04/2021    RBC 4.50 03/04/2021    RBC 5.29 05/02/2016    HGB 14.5 03/04/2021    HCT 41.7 03/04/2021    MCV 92.7 03/04/2021    RDW 12.5 03/04/2021     03/04/2021       CMP:   Lab Results   Component Value Date     03/04/2021    K 4.2 03/04/2021     03/04/2021    CO2 22 03/04/2021    BUN 20 03/04/2021    CREATININE 0.80 03/04/2021    GFRAA >60 03/04/2021    LABGLOM >60 03/04/2021    GLUCOSE 109 03/04/2021    PROT 7.5 05/02/2016    CALCIUM 9.7 03/04/2021    BILITOT 0.2 05/02/2016    ALKPHOS 67 05/02/2016    AST 21 05/02/2016    ALT 56 05/02/2016       POC Tests: No results for input(s): POCGLU, POCNA, POCK, POCCL, POCBUN, POCHEMO, POCHCT in the last 72 hours.     Coags: No results found for: PROTIME, INR, APTT    HCG (If Applicable): No results found for: PREGTESTUR, PREGSERUM, HCG, HCGQUANT     ABGs: No results found for: PHART, PO2ART, URR8QFT, PJI9UPO, BEART, H4NUFGBD     Type & Screen (If Applicable):  No results found for: LABABO, LABRH    Drug/Infectious Status (If Applicable):  No results found for: HIV, HEPCAB    COVID-19 Screening (If Applicable):   Lab Results   Component Value Date    COVID19 Not Detected 05/04/2021           Anesthesia Evaluation  Patient summary reviewed and Nursing notes reviewed no history of anesthetic complications:   Airway: Mallampati: II  TM distance: >3 FB   Neck ROM: full  Mouth opening: > = 3 FB Dental: normal exam         Pulmonary:Negative Pulmonary ROS and normal exam  breath sounds clear to auscultation                             Cardiovascular:    (+) hypertension:,       ECG reviewed  Rhythm: regular  Rate: normal                    Neuro/Psych:   (+) psychiatric history:            GI/Hepatic/Renal: Neg GI/Hepatic/Renal ROS            Endo/Other: Negative Endo/Other ROS                    Abdominal:   (+) obese,         Vascular: negative vascular ROS. Anesthesia Plan      general     ASA 3       Induction: intravenous. MIPS: Postoperative opioids intended and Prophylactic antiemetics administered. Anesthetic plan and risks discussed with patient. Use of blood products discussed with patient whom. Plan discussed with attending.                   Krish Loaz, TOMMIE - CRNA   5/4/2021

## 2021-05-04 NOTE — PROGRESS NOTES
Pt groggy, able to open eyes to speech. Moves ext x 4 on command. Sutures noted left ear, dressing in ear C/D/I. No drainage noted from site.  Electronically signed by Norman Piedra RN on 5/4/2021 at 2:56 PM

## 2021-05-04 NOTE — OP NOTE
REPORT OF OPERATION      Patient Name: Audie Primrose   YOB: 1985   MRN: [id number]   Date of Procedure: 5/4/2021         Pre-operative diagnosis: CHOLESTEATOMA OF ATTIC, LEFT CHRONIC INFLAMATION OF LEFT MASTOID      Post-operative diagnosis: Same    Procedure:   Left tympanomasotidectomy, canal wall down without ossicular chain reconstruction      Audie Primrose is a 28 y.o. male who presents with left middle ear cholesteatoma and chronic discharge. The above procedure was advised in the hopes of relief and the patient and/or caregiver was agreeable to proceed. The risks, alternatives, potential complications, and benefits were discussed at length and witnessed informed consent obtained. Procedure detail:  The patient was identified in the preoperative holding and the surgical site marked in accordance with the patient's history, exam, and office notes. The patient was then brought to the operating room and was placed under general anesthesia and intubated. The facial nerve monitoring electrodes were then placed in accordance with the 's directions and confirmed be operational prior to proceeding. The left ear was then injected with 1% lidocaine with 100,000 epinephrine along the planned postauricular incision site as well as in the external auditory canal.  Through a #6 otic speculum the external auditory canal and tympanic membrane were visualized. A Betadine prep solution was then irrigated clear with saline and the tympanic membrane visualized. There is noted to be a large perforation with a middle ear polyp and cholesteatoma. The long process of the incus was noted to be absent. The edge of the perforation was then freshened with a gently curved pick and the epithelial edge was then withdrawn with a cup forceps and discarded.   Using a round knife an incision was then made along the lateral aspect of the posterior auditory canal and a tympanomeatal flap developed anteriorly to the level of the annulus. This was then undermined with a curved pick and the middle ear cleft entered. The annulus was then widely elevated with a gimmick elevator. The posterior aspect of the tympanic membrane flap was then draped anteriorly. An epinephrine soaked cottonball was then placed into the middle ear cleft for hemostasis. Attention was then turned to the mastoidectomy portion of the procedure. A postauricular incision was then made a 15 blade scalpel through the skin and subcutaneous tissues 5 cm in length. Dissection was then carried out to the subfascial plane and a wide area undermined to allow for harvest of loose areolar tissue graft material.  Self-retaining retractors were then placed in an area of loose areolar tissue 2 x 2.5 cm was then harvested and set aside on a Honorio block for reconstruction of the tympanic membrane. Using monopolar cautery the wound edges were then cauterized for hemostasis and the incision then made along the temporal line. A descending limb extending to the mastoid tip was then created. Using the [facility] elevator the the periosteum was then freed posteriorly and superiorly. A [place] elevator was then used to develop the flap anteriorly joining the external auditory canal incision. Self-retaining retractors were then placed. Mastoidectomy was then carried out beginning with a #6 cutting bur followed by a #4 dale. There is noted to be a very poorly developed mastoid cavity and an anteriorly displaced sigmoid sinus. This was carefully preserved. Given the very tight space the posterior auditory canal was taken down to allow for visualization of the lateral semicircular canal and facial nerve. The epinephrine soaked cotton ball was removed. The chorda tympani was found to be involved in the granulation tissue and cholesteatoma and was sacrificed.   The incus was noted to be significantly eroded with a missing long process and this was then dissected free and removed. The stapes superstructure was noted to be intact and was cleaned of the adherent granulation tissue. The tensor tympani muscle was then sharply transected and the malleus then removed to allow for medial placement of the new tympanic membrane in contact with the stapes superstructure. The facial recess was then developed and cleaned of cholesteatoma and inflammatory debris. This resulted in a clean middle ear cleft. Attention was then turned to reconstruction of the tympanic membrane. The eustachian tube the middle ear cleft was filled with Gelfoam material to support the graft. The previous Dev harvested loose areolar tissue graft was then placed in underlay fashion ensuring that it was placed beneath the edge of the perforation anteriorly and contact with the stapes superstructure with the excess sling over the facial recess facial nerve and lateral semicircular canal.  Gelfoam material was then placed to hold this in position followed by bacitracin ointment. The postauricular incision was closed with interrupted 3-0 Vicryl sutures followed by running 5-0 Monocryl suture to the skin. Superior and inferiorly relaxing incisions of the auditory canal were then created to make a modest meatal plasty given the very small size of the mastoid cavity as it was severely underdeveloped, and Ambrus pack then placed. The facial nerve monitoring electrodes were then removed and the prep solution was cleaned from the patient's face. The patient was then returned to anesthesia, was revived, and was extubated without complication having tolerated the procedure well.             Anesthesia: General     Surgeon(s):  Deangelo Bowers MD     Staff:  Scrub Person First: Samantha Alexander  Physician Assistant: ANNA Hernandes    Estimated blood loss: 20 mL    Fluids: 1200 mL    Specimens: choleasteatoma and granulation tissue of middle ear    Drains: none    Grafts or implants: none    Complications: none        PRANAV Isbell MD   Date: 5/4/21   Time: 2:34 PM EDT

## 2021-05-04 NOTE — INTERVAL H&P NOTE
History & Physical Examination Update    Patient's history and physical from his pre-operative evaluation was reviewed. On review and examination today, 5/4/2021, there has been no significant change in the treatment plan or examination unless noted below.     PRANAV Ramires

## 2021-05-04 NOTE — ANESTHESIA POSTPROCEDURE EVALUATION
Department of Anesthesiology  Postprocedure Note    Patient: Darshan Guan  MRN: [id number]  [place]: 1985  Date of evaluation: 5/4/2021  Time:  5:40 PM     Procedure Summary     Date: 05/04/21 Room / Location: [address] [place] [address]    Anesthesia Start: 1222 Anesthesia Stop: 5784    Procedure: TYMPANOMASTOIDECTOMY (Left ) Diagnosis: (CHOLESTEATOMA OF ATTIC, LEFT CHRONIC INFLAMATION OF LEFT MASTOID)    Surgeons: Violet Hess MD Responsible Provider: TOMMIE Barreto CRNA    Anesthesia Type: general ASA Status: 3          Anesthesia Type: general    Nika Phase I: Nika Score: 9    Nika Phase II: Nika Score: 10    Last vitals: Reviewed and per EMR flowsheets.        Anesthesia Post Evaluation    Patient location during evaluation: bedside  Patient participation: complete - patient participated  Level of consciousness: awake and alert  Pain score: 0  Airway patency: patent  Nausea & Vomiting: no nausea and no vomiting  Complications: no  Cardiovascular status: blood pressure returned to baseline and hemodynamically unstable  Respiratory status: acceptable and spontaneous ventilation  Hydration status: euvolemic

## 2021-05-05 ENCOUNTER — TELEPHONE (OUTPATIENT)
Dept: ENT CLINIC | Age: 36
End: 2021-05-05

## 2021-05-05 NOTE — TELEPHONE ENCOUNTER
POD #1 status post left tympanomastoidectomy. This is a call to check on Pt after surgery. Mailbox was full so I couldn't leave a message.

## 2021-05-05 NOTE — TELEPHONE ENCOUNTER
Patient's wife, Sharmila Davenport, returned call to office. Stated that patient left facial droop is no longer present. No fever or chills. Stated minimal amount of left facial swelling and small amount of ear drainage has been present today. Denied any further symptoms. Encouraged patient to contact office with any questions or concerns. Post-op appointment scheduled for 05/12/21.

## 2021-05-06 LAB — SURGICAL PATHOLOGY REPORT: NORMAL

## 2021-05-12 ENCOUNTER — OFFICE VISIT (OUTPATIENT)
Dept: ENT CLINIC | Age: 36
End: 2021-05-12

## 2021-05-12 VITALS — HEIGHT: 71 IN | WEIGHT: 311 LBS | BODY MASS INDEX: 43.54 KG/M2 | RESPIRATION RATE: 20 BRPM | TEMPERATURE: 98.8 F

## 2021-05-12 DIAGNOSIS — H71.02 CHOLESTEATOMA OF ATTIC, LEFT: Primary | ICD-10-CM

## 2021-05-12 DIAGNOSIS — H90.12 CONDUCTIVE HEARING LOSS OF LEFT EAR WITH UNRESTRICTED HEARING OF RIGHT EAR: ICD-10-CM

## 2021-05-12 PROCEDURE — 99024 POSTOP FOLLOW-UP VISIT: CPT | Performed by: OTOLARYNGOLOGY

## 2021-05-12 ASSESSMENT — ENCOUNTER SYMPTOMS
EYE ITCHING: 0
PHOTOPHOBIA: 0
ANAL BLEEDING: 0
COLOR CHANGE: 0
SHORTNESS OF BREATH: 0
BLOOD IN STOOL: 0
SINUS PRESSURE: 0
ABDOMINAL PAIN: 0
SORE THROAT: 0
COUGH: 0
EYE DISCHARGE: 0
BACK PAIN: 0
FACIAL SWELLING: 0
VOMITING: 0
ABDOMINAL DISTENTION: 0
RECTAL PAIN: 0
DIARRHEA: 0
NAUSEA: 0
RHINORRHEA: 0
TROUBLE SWALLOWING: 0
WHEEZING: 0
VOICE CHANGE: 0
EYE PAIN: 0
SINUS PAIN: 0
CHOKING: 0
EYE REDNESS: 0
STRIDOR: 0
APNEA: 0
CONSTIPATION: 0
CHEST TIGHTNESS: 0

## 2021-05-12 NOTE — PROGRESS NOTES
Glands: no enlargements of parotid glands, no tenderness of parotid glands, no masses of parotid glands, clear salivary flow on palpation from Stensen's ducts, no duct stones of Stensen's duct, no enlargement of submandibular glands, no tenderness of submandibular glands, no masses of submandibular glands, clear salivary flow from Somerset's ducts, no stones of Somerset's ducts    Temporomandibular Joint: no crepitus with motion, no tenderness on palpation, no trismus, motion symmetric    EYES:    Pupils: PERRLA, extra-ocular movements intact, no nystagmus, sclera white, no redness of eyes, no watering of eyes    EARS:    Bilateral External Ears: no pits, no tags    Right External Ear: normally formed, no lesions, no mastoid tenderness    Left External Ear: normally formed, no lesions, no mastoid tenderness, post-auricular incision intact, sutures removed    Right External Auditory Canal: normal, healthy skin, no obstructing cerumen, no discharge    Left External Auditory Canal: healing well with graft intact    Right Tympanic Membrane: normal landmarks, translucent, mobile to pneumatic otoscopy, no perforation    Left Tympanic Membrane: loss of normal landmarks, graft intact    Hearing: intact to spoken voice    SKIN:    General Appearance: no lesions, warm and dry, normal turgor, no bruising    NEUROLOGICAL SYSTEM:    Orientation: oriented to time, oriented to place, oriented to person    Cranial Nerves: Cranial Nerves II-XII intact, normal facial movement    PSYCHIATRIC:    Mood and affect: normal mood, normal affect          Assessment and Plan:    He appears to have excellent healing and happily is reported improved hearing after his ossicular reconstruction. The loss of taste is due to sacrifice of the chorda tympani nerve at surgery and this is normal and expected. We have discussed that this will generally subside over time.  He has some brief facial weakness that was due to lidocaine infiltration and this has completely resolved. Overall, he is healing well and as anticipated and I am hopeful for a good hearing outcome. He may take some time off work until his imbalance clears which I feel is due to the packing material within the middle ear cleft and should subside over time. Diagnosis Orders   1. Cholesteatoma of attic, left     2. Conductive hearing loss of left ear with unrestricted hearing of right ear        Return in about 1 month (around 6/12/2021). The patient and/or caregiver is to notify the office if no improvement or worsening of symptoms is noted prior to the scheduled follow-up for sooner evaluation. The patient and/or caregiver is able to state an understanding of these recommendations and is agreeable to the treatment plan. --Renee Cee MD on 5/12/2021 at 12:37 PM    An electronic signature was used to authenticate this note.

## 2021-05-19 ENCOUNTER — TELEPHONE (OUTPATIENT)
Dept: ENT CLINIC | Age: 36
End: 2021-05-19

## 2021-05-19 NOTE — TELEPHONE ENCOUNTER
Alexus Pendleton called in regards to Elier Monique about his [facility] papers, they are requesting a call back.

## 2021-05-21 NOTE — TELEPHONE ENCOUNTER
Spoke with patient's wife regarding FMLA. Anticipating return to work date 06/02/21. Status post left tympanomastoidectomy 05/04/21. Informed that office staff will complete paperwork for Dr Julio Ashley to review and sign next Wednesday. Patient's wife stated patient is still experiencing ear pain and imbalance. Dr Julio Ashley discussed packing material with patient during post-op visit. Patient's wife also stated his temperature has been between  F over the past 24 hours and foul smelling ear drainage has been present. Requested soon follow-up. Appointment given for 05/26/21.

## 2021-05-26 ENCOUNTER — OFFICE VISIT (OUTPATIENT)
Dept: ENT CLINIC | Age: 36
End: 2021-05-26

## 2021-05-26 VITALS
DIASTOLIC BLOOD PRESSURE: 68 MMHG | BODY MASS INDEX: 43.73 KG/M2 | HEIGHT: 71 IN | TEMPERATURE: 98.2 F | OXYGEN SATURATION: 96 % | HEART RATE: 77 BPM | SYSTOLIC BLOOD PRESSURE: 114 MMHG | WEIGHT: 312.4 LBS

## 2021-05-26 DIAGNOSIS — H71.02 CHOLESTEATOMA OF ATTIC, LEFT: Primary | ICD-10-CM

## 2021-05-26 DIAGNOSIS — H70.12 CHRONIC MASTOIDITIS OF LEFT SIDE: ICD-10-CM

## 2021-05-26 PROCEDURE — 99024 POSTOP FOLLOW-UP VISIT: CPT | Performed by: OTOLARYNGOLOGY

## 2021-05-26 ASSESSMENT — ENCOUNTER SYMPTOMS
COUGH: 0
VOMITING: 0
SHORTNESS OF BREATH: 0
ANAL BLEEDING: 0
CONSTIPATION: 0
SINUS PRESSURE: 0
ABDOMINAL PAIN: 0
RHINORRHEA: 0
EYE ITCHING: 0
SORE THROAT: 0
APNEA: 0
ABDOMINAL DISTENTION: 0
SINUS PAIN: 0
EYE PAIN: 0
BACK PAIN: 0
STRIDOR: 0
VOICE CHANGE: 0
CHOKING: 0
DIARRHEA: 0
TROUBLE SWALLOWING: 0
RECTAL PAIN: 0
EYE REDNESS: 0
EYE DISCHARGE: 0
COLOR CHANGE: 0
FACIAL SWELLING: 0
BLOOD IN STOOL: 0
WHEEZING: 0
CHEST TIGHTNESS: 0
NAUSEA: 0
PHOTOPHOBIA: 0

## 2021-05-26 NOTE — PROGRESS NOTES
Review of Systems   Constitutional: Positive for fatigue. Negative for activity change, appetite change, chills, diaphoresis, fever and unexpected weight change. HENT: Positive for ear discharge and ear pain (left). Negative for congestion, dental problem, drooling, facial swelling, hearing loss, mouth sores, nosebleeds, postnasal drip, rhinorrhea, sinus pressure, sinus pain, sneezing, sore throat, tinnitus, trouble swallowing and voice change. Eyes: Negative for photophobia, pain, discharge, redness, itching and visual disturbance. Respiratory: Negative for apnea, cough, choking, chest tightness, shortness of breath, wheezing and stridor. Cardiovascular: Negative for chest pain, palpitations and leg swelling. Gastrointestinal: Negative for abdominal distention, abdominal pain, anal bleeding, blood in stool, constipation, diarrhea, nausea, rectal pain and vomiting. Endocrine: Negative for cold intolerance, heat intolerance, polydipsia, polyphagia and polyuria. Genitourinary: Negative for decreased urine volume, difficulty urinating, discharge, dysuria, enuresis, flank pain, frequency, genital sores, hematuria, penile pain, penile swelling, scrotal swelling, testicular pain and urgency. Musculoskeletal: Positive for neck stiffness. Negative for arthralgias, back pain, gait problem, joint swelling, myalgias and neck pain. Skin: Negative for color change, pallor, rash and wound. Allergic/Immunologic: Negative for environmental allergies, food allergies and immunocompromised state. Neurological: Positive for dizziness and headaches. Negative for tremors, seizures, syncope, facial asymmetry, speech difficulty, weakness, light-headedness and numbness. Hematological: Negative for adenopathy. Does not bruise/bleed easily.    Psychiatric/Behavioral: Negative for agitation, behavioral problems, confusion, decreased concentration, dysphoric mood, hallucinations, self-injury, sleep disturbance and suicidal ideas. The patient is not nervous/anxious and is not hyperactive.

## 2021-05-26 NOTE — PROGRESS NOTES
[facility] [address] [place] [address] [facility]  [address] [facility] [place] [address]  Dept: [phone]  Tracie Sommers MD    Nu Roland 28 y.o. male     Patient presents with a chief complaint of Follow-up (Patient requested sooner follow-up due to left ear pain/imbalance. Status post left tympanomastoidectomy 05/04/21. POD 22.)       /68 (Site: Left Upper Arm, Position: Sitting, Cuff Size: Medium Adult)   Pulse 77   Temp 98.2 °F (36.8 °C) (Temporal)   Ht 5' 11\" (1.803 m)   Wt (!) 312 lb 6.4 oz (141.7 kg)   SpO2 96%   BMI 43.57 kg/m²       History of Presenting Illness: The patient/caregiver reports a history of complaint with the following features:    He reports that he has some discharge from the left ear that concerned him for infection but that this has cleared up. He continues to report some mild imbalance, but denies jami spinning or vertigo. He reports that he continues to notice improved hearing. He has some mild pressure and fullness in the left ear, but no significant pain. Review of systems covering 10 systems is reviewed as staff entry in other note and pertinent positives and negatives noted.     Past Medical History:   Diagnosis Date    Bipolar 1 disorder ([facility] [address]     Hypertension     Kidney stones     Obesity     Pre-diabetes        Current Outpatient Medications:     acetaminophen (TYLENOL) 325 MG tablet, Take 2 tablets by mouth every 6 hours as needed (post-op pain), Disp: 1 tablet, Rfl: 0    ibuprofen (ADVIL;MOTRIN) 800 MG tablet, Take 800 mg by mouth every 8 hours as needed for Pain, Disp: , Rfl:     Brompheniramine-Phenylephrine (COLD & ALLERGY PO), Take by mouth, Disp: , Rfl:    No Known Allergies   Past Surgical History:   Procedure Laterality Date    TESTICLE REMOVAL      TONSILLECTOMY      TYMPANOMASTOIDECTOMY Left 5/4/2021    TYMPANOMASTOIDECTOMY performed by Tracie Sommers MD at [address] atraumatic, normocephalic, no lesions    Facial Inspection: no lesions, healthy skin    Facial Strength: motor strength normal, symmetric strength, symmetric movement, motor strength    Sinuses: no sinus tenderness    Salivary Glands: no enlargements of parotid glands, no tenderness of parotid glands, no masses of parotid glands, clear salivary flow on palpation from Stensen's ducts, no duct stones of Stensen's duct, no enlargement of submandibular glands, no tenderness of submandibular glands, no masses of submandibular glands, clear salivary flow from Powhatan's ducts, no stones of Powhatan's ducts    Temporomandibular Joint: no crepitus with motion, no tenderness on palpation, no trismus, motion symmetric    EYES:    Pupils: PERRLA, extra-ocular movements intact, no nystagmus, sclera white, no redness of eyes, no watering of eyes    EARS:    Bilateral External Ears: no pits, no tags    Right External Ear: normally formed, no lesions, no mastoid tenderness    Left External Ear: normally formed, no lesions, no mastoid tenderness, healing post-auricular incision with sutures removed    Right External Auditory Canal: normal, healthy skin, no obstructing cerumen, no discharge    Left External Auditory Canal: Small mastoid cavity with scan debris suctioned, boric acid powder applied    Right Tympanic Membrane: normal landmarks, translucent, mobile to pneumatic otoscopy, no perforation    Left Tympanic Membrane: full graft take with excellent healing    Hearing: intact to spoken voice    RESPIRATORY:    Inspection/Auscultation: good air movement, chest expands symmetrically, normal breath sounds, no wheezing, no stridor    CARDIOVASCULAR SYSTEM:    Auscultation: regular rate and rhythm, carotid pulse normal, no carotid thrills, no carotid bruits    Observation/Palpation of Peripheral Vascular System: no varicosities, no cyanosis, no edema    SKIN:    General Appearance: no lesions, warm and dry, normal turgor, no bruising NEUROLOGICAL SYSTEM:    Orientation: oriented to time, oriented to place, oriented to person    Cranial Nerves: Cranial Nerves II-XII intact, normal facial movement    PSYCHIATRIC:    Mood and affect: normal mood, normal affect          Assessment and Plan:    The patient is doing well after surgery with good healing. We have discussed ongoing wound care and any anticipated changes with healing. Repeat assessment is recommended as scheduled. The patient/caregiver is to notify the office if no improvement or worsening of symptoms is noted prior to the scheduled follow-up for sooner evaluation. The patient and/or caregiver is able to state an understanding of these recommendations and is agreeable to the treatment plan. He has a small and excellently healing mastoid and tympanic membrane. I suspect he may have some deconditioning and have encouraged he increase his level of activity in preparation for return to work. We have also discussed that the middle ear packing can cause some off balance sensation and that this should improve as this breaks down, but that I expect ongoing improvement over time. Diagnosis Orders   1. Cholesteatoma of attic, left     2. Chronic mastoiditis of left side        Return in about 3 months (around 8/26/2021). The patient and/or caregiver is to notify the office if no improvement or worsening of symptoms is noted prior to the scheduled follow-up for sooner evaluation. The patient and/or caregiver is able to state an understanding of these recommendations and is agreeable to the treatment plan. --Kaushal Lynch MD on 5/26/2021 at 11:36 AM    An electronic signature was used to authenticate this note.

## 2021-06-18 ENCOUNTER — HOSPITAL ENCOUNTER (EMERGENCY)
Age: 36
Discharge: HOME | End: 2021-06-18
Attending: FAMILY MEDICINE
Payer: COMMERCIAL

## 2021-06-18 VITALS
OXYGEN SATURATION: 97 % | HEART RATE: 85 BPM | WEIGHT: 315 LBS | TEMPERATURE: 98.9 F | BODY MASS INDEX: 44.03 KG/M2 | DIASTOLIC BLOOD PRESSURE: 89 MMHG | RESPIRATION RATE: 16 BRPM | SYSTOLIC BLOOD PRESSURE: 149 MMHG

## 2021-06-18 DIAGNOSIS — S10.96XA INSECT BITE OF NECK, INITIAL ENCOUNTER: Primary | ICD-10-CM
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
## 2021-06-18 PROCEDURE — 99283 EMERGENCY DEPT VISIT LOW MDM: CPT

## 2021-06-18 PROCEDURE — 6370000000 HC RX 637 (ALT 250 FOR IP)

## 2021-06-18 RX ORDER — DOXYCYCLINE HYCLATE 100 MG
100 TABLET ORAL ONCE
Status: DISCONTINUED | OUTPATIENT
Start: 2021-06-18 | End: 2021-06-18 | Stop reason: HOSPADM

## 2021-06-18 RX ORDER — DOXYCYCLINE HYCLATE 100 MG
100 TABLET ORAL 2 TIMES DAILY
Qty: 19 TABLET | Refills: 0 | Status: SHIPPED | OUTPATIENT
Start: 2021-06-18 | End: 2021-06-28

## 2021-06-18 RX ORDER — DOXYCYCLINE HYCLATE 100 MG
TABLET ORAL
Status: COMPLETED
Start: 2021-06-18 | End: 2021-06-18

## 2021-06-18 RX ADMIN — DOXYCYCLINE HYCLATE 100 MG: 100 TABLET, COATED ORAL at 19:51

## 2021-06-18 ASSESSMENT — PAIN SCALES - GENERAL: PAINLEVEL_OUTOF10: 8

## 2021-06-18 ASSESSMENT — PAIN DESCRIPTION - ONSET: ONSET: ON-GOING

## 2021-06-18 ASSESSMENT — PAIN DESCRIPTION - ORIENTATION: ORIENTATION: RIGHT

## 2021-06-18 ASSESSMENT — PAIN DESCRIPTION - LOCATION: LOCATION: NECK

## 2021-06-18 ASSESSMENT — PAIN DESCRIPTION - DESCRIPTORS: DESCRIPTORS: BURNING;ITCHING

## 2021-06-18 ASSESSMENT — PAIN DESCRIPTION - FREQUENCY: FREQUENCY: CONTINUOUS

## 2021-06-19 NOTE — ED PROVIDER NOTES
[address] [facility]  eMERGENCY dEPARTMENT eNCOUnter          [address] [facility]       Chief Complaint   Patient presents with    Insect Bite     Pt was sitting at home, felt an \"itch,\" then right neck started to swell from insect bite. Nurses Notes reviewed and I agree except as noted in the HPI. HISTORY OF PRESENT ILLNESS    Man Boogie is a 28 y.o. male who presents to the emergency room via private vehicle with significant other, patient complaining of possible insect bite to the right neck, he is unsure of the exact timeline though noticed an area earlier in his anterior lateral neck on the right when he was rubbing it when it itched, states it is increased in size and feels little warm. Patient denies any drainage from the site. Patient thinks he may have been bitten by an insect but is unsure. Denies any known history of MRSA or other antibiotic resistant infections. PCP: ANNA Hernandez    REVIEW OF SYSTEMS     Review of Systems   All other systems reviewed and are negative. PAST MEDICAL HISTORY    has a past medical history of Bipolar 1 disorder ([address] Hypertension, Kidney stones, Obesity, and Pre-diabetes. SURGICAL HISTORY      has a past surgical history that includes Umbilical hernia repair; Tympanostomy tube placement; Tympanostomy tube placement; Testicle removal; Tonsillectomy; and tympanomastoidectomy (Left, 5/4/2021). CURRENT MEDICATIONS       Discharge Medication List as of 6/18/2021  7:47 PM      CONTINUE these medications which have NOT CHANGED    Details   acetaminophen (TYLENOL) 325 MG tablet Take 2 tablets by mouth every 6 hours as needed (post-op pain), Disp-1 tablet, R-0OTC      ibuprofen (ADVIL;MOTRIN) 800 MG tablet Take 800 mg by mouth every 8 hours as needed for PainHistorical Med      Brompheniramine-Phenylephrine (COLD & ALLERGY PO) Take by mouthHistorical Med             ALLERGIES     has No Known Allergies.     FAMILY HISTORY     He indicated that his mother is alive. He indicated that his father is . He indicated that his maternal grandmother is . He indicated that his maternal grandfather is . He indicated that his paternal grandmother is . He indicated that his paternal grandfather is . family history is not on file. SOCIAL HISTORY      reports that he has been smoking cigarettes. He has been smoking about 0.50 packs per day. He has quit using smokeless tobacco.  His smokeless tobacco use included snuff and chew. He reports that he does not drink alcohol and does not use drugs. PHYSICAL EXAM     INITIAL VITALS:  weight is 315 lb 11.2 oz (143.2 kg) (abnormal). His oral temperature is 98.9 °F (37.2 °C). His blood pressure is 149/89 (abnormal) and his pulse is 85. His respiration is 16 and oxygen saturation is 97%. Physical Exam   Constitutional: Patient is oriented to person, place, and time. Patient appears well-developed and well-nourished. Patient is active and cooperative. HENT:   Head: Normocephalic and atraumatic. Head is without contusion. Right Ear: Hearing and external ear normal. No drainage. Left Ear: Hearing and external ear normal. No drainage. Nose: Nose normal. No nasal deformity. No epistaxis. Mouth/Throat: Mucous membranes are not dry. Eyes: EOMI. Conjunctivae, sclera, and lids are normal. Right eye exhibits no discharge. Left eye exhibits no discharge. Neck: Full passive range of motion without pain and phonation normal.  On the right anterior lateral neck, there is an area of mild erythema and slightly raised induration measuring approximately 2 x 7 cm, there is no gross fluctuance, no overlying integument aberration or excoriation  Cardiovascular:  Normal rate, regular rhythm and intact distal pulses. Pulses: Right radial pulse  2+   Pulmonary/Chest: Effort normal. No tachypnea and no bradypnea.    Abdominal: BMI 44.0  Musculoskeletal:   Negative acute trauma or deformity,  apparent full range of motion and normal strength all extremities appropriate to age. Neurological: Patient is alert and oriented to person, place, and time. patient displays no tremor. Patient displays no seizure activity. .  Lymphatic: No gross cervical lymphadenopathy  Skin: Skin is warm and dry. Patient is not diaphoretic. Psychiatric: Patient has a normal mood and affect. Patient speech is normal and behavior is normal. Cognition and memory are normal.    DIFFERENTIAL DIAGNOSIS:   Insect bite, cellulitis, abscess    DIAGNOSTIC RESULTS           RADIOLOGY: non-plain film images(s) such as CT, Ultrasound and MRI are read by the radiologist.  No orders to display       LABS:   Labs Reviewed - No data to display    EMERGENCY DEPARTMENT COURSE:   Vitals:    Vitals:    06/18/21 1907   BP: (!) 149/89   Pulse: 85   Resp: 16   Temp: 98.9 °F (37.2 °C)   TempSrc: Oral   SpO2: 97%   Weight: (!) 315 lb 11.2 oz (143.2 kg)     Patient history and physical exam taken at bedside, discussed patient symptoms and exam findings, discussed with like to use ultrasound to get a better idea of the area on the side of his neck to look for any potential fluid collection or abscess, patient acknowledges. Utilizing POCUS with a [facility], was able to visualize inflammatory tissue without gross abscess collection, able to identify the carotid artery using the Doppler feature. Discussed with patient inflammation and possible early cellulitis in the neck, I do not appreciate a gross abscess, discussed using warm compresses over the area, confirmed allergies, will initiate patient on doxycycline twice daily for 10 days, follow-up with primary care, return to ER if any symptoms change worsen or other concerns, acknowledged    FINAL IMPRESSION      1.  Insect bite of neck, initial encounter          DISPOSITION/PLAN   D/c    PATIENT REFERRED TO:  Vicente Flores  [address] [place] [id number]    Call [facility] ED  [address] [facility] [address]  [phone]    As needed, If symptoms worsen      DISCHARGE MEDICATIONS:  Discharge Medication List as of 6/18/2021  7:47 PM      START taking these medications    Details   doxycycline hyclate (VIBRA-TABS) 100 MG tablet Take 1 tablet by mouth 2 times daily for 10 days, Disp-19 tablet, R-0Normal                 Summation      Patient Course:  D/c    ED Medications administered this visit:    Medications   doxycycline hyclate (VIBRA-TABS) 100 MG tablet (100 mg  Given 6/18/21 1951)       New Prescriptions from this visit:    Discharge Medication List as of 6/18/2021  7:47 PM      START taking these medications    Details   doxycycline hyclate (VIBRA-TABS) 100 MG tablet Take 1 tablet by mouth 2 times daily for 10 days, Disp-19 tablet, R-0Normal             Follow-up:  Lázaro Emery  [place] [id number] [phone]    Call       [facility] ED  [address] [facility] [address] [phone]    As needed, If symptoms worsen        Final Impression:   1.  Insect bite of neck, initial encounter               (Please note that portions of this note were completed with a voice recognition program.  Efforts were made to edit the dictations but occasionally words are mis-transcribed.)    MD Soy Cole MD  06/19/21 [id number]

## 2022-01-31 ENCOUNTER — OFFICE VISIT (OUTPATIENT)
Dept: PRIMARY CARE CLINIC | Age: 37
End: 2022-01-31
Payer: COMMERCIAL

## 2022-01-31 VITALS
RESPIRATION RATE: 22 BRPM | OXYGEN SATURATION: 98 % | TEMPERATURE: 98.6 F | HEIGHT: 71 IN | DIASTOLIC BLOOD PRESSURE: 108 MMHG | HEART RATE: 99 BPM | WEIGHT: 315 LBS | BODY MASS INDEX: 44.1 KG/M2 | SYSTOLIC BLOOD PRESSURE: 148 MMHG

## 2022-01-31 DIAGNOSIS — J02.9 SORE THROAT: ICD-10-CM

## 2022-01-31 DIAGNOSIS — J03.90 TONSILLITIS: Primary | ICD-10-CM

## 2022-01-31 LAB — S PYO AG THROAT QL: NORMAL

## 2022-01-31 PROCEDURE — G8417 CALC BMI ABV UP PARAM F/U: HCPCS | Performed by: NURSE PRACTITIONER

## 2022-01-31 PROCEDURE — 4004F PT TOBACCO SCREEN RCVD TLK: CPT | Performed by: NURSE PRACTITIONER

## 2022-01-31 PROCEDURE — G8427 DOCREV CUR MEDS BY ELIG CLIN: HCPCS | Performed by: NURSE PRACTITIONER

## 2022-01-31 PROCEDURE — G8484 FLU IMMUNIZE NO ADMIN: HCPCS | Performed by: NURSE PRACTITIONER

## 2022-01-31 PROCEDURE — 99213 OFFICE O/P EST LOW 20 MIN: CPT | Performed by: NURSE PRACTITIONER

## 2022-01-31 PROCEDURE — 87880 STREP A ASSAY W/OPTIC: CPT | Performed by: NURSE PRACTITIONER

## 2022-01-31 RX ORDER — PENICILLIN V POTASSIUM 500 MG/1
500 TABLET ORAL 2 TIMES DAILY
Qty: 20 TABLET | Refills: 0 | Status: SHIPPED | OUTPATIENT
Start: 2022-01-31 | End: 2022-02-10

## 2022-01-31 ASSESSMENT — ENCOUNTER SYMPTOMS
WHEEZING: 0
COUGH: 1
RHINORRHEA: 0
NAUSEA: 0
SHORTNESS OF BREATH: 0
VOMITING: 0
DIARRHEA: 0
SORE THROAT: 1

## 2022-01-31 NOTE — PATIENT INSTRUCTIONS
Patient Education        Tonsillitis: Care Instructions  Overview     Tonsillitis is an infection of the tonsils that is caused by bacteria or a virus. The tonsils are in the back of the throat and are part of the immune system. Tonsillitis typically lasts from a few days up to a couple of weeks. Tonsillitis caused by a virus goes away on its own. Tonsillitis caused by the bacteria that causes strep throat is treated with antibiotics. You and your doctor may consider surgery to remove the tonsils (tonsillectomy) if you have serious complications or repeat infections. Follow-up care is a key part of your treatment and safety. Be sure to make and go to all appointments, and call your doctor if you are having problems. It's also a good idea to know your test results and keep a list of the medicines you take. How can you care for yourself at home? Home care can help with a sore throat and other symptoms. Here are some things you can do to help yourself feel better. · If your doctor prescribed antibiotics, take them as directed. Do not stop taking them just because you feel better. You need to take the full course of antibiotics. · Gargle with warm salt water. This helps reduce swelling and relieve discomfort. Gargle once an hour with 1 teaspoon of salt mixed in 8 fluid ounces of warm water. · Take an over-the-counter pain medicine, such as acetaminophen (Tylenol), ibuprofen (Advil, Motrin), or naproxen (Aleve). Be safe with medicines. Read and follow all instructions on the label. No one younger than 20 should take aspirin. It has been linked to Reye syndrome, a serious illness. · Be careful when taking over-the-counter cold or flu medicines and Tylenol at the same time. Many of these medicines have acetaminophen, which is Tylenol. Read the labels to make sure that you are not taking more than the recommended dose. Too much acetaminophen (Tylenol) can be harmful.   · Try lozenges or an over-the-counter throat spray to relieve throat pain. · Drink plenty of fluids. Fluids may help soothe an irritated throat. Drink warm or cool liquids (whichever feels better). These include tea, soup, and juice. · Do not smoke, and avoid secondhand smoke. Smoking can make tonsillitis worse. If you need help quitting, talk to your doctor about stop-smoking programs and medicines. These can increase your chances of quitting for good. · Use a vaporizer or humidifier to add moisture to your bedroom. Follow the directions for cleaning the machine. · Get plenty of rest.  When should you call for help? Call your doctor now or seek immediate medical care if:    · Your pain gets worse on one side of your throat.     · You have a new or higher fever.     · You notice changes in your voice.     · You have trouble opening your mouth.     · You have any trouble breathing.     · You have much more trouble swallowing.     · You have a fever with a stiff neck or a severe headache.     · You are sensitive to light or feel very sleepy or confused. Watch closely for changes in your health, and be sure to contact your doctor if:    · You do not get better after 2 days. Where can you learn more? Go to [email or web address]://[email or web address].Declara. org and sign in to your [employer] account. Enter H166 in the [facility] box to learn more about \"Tonsillitis: Care Instructions. \"     If you do not have an account, please click on the \"Sign Up Now\" link. Current as of: September 8, 2021               Content Version: 13.1  © 2006-2021 Healthwise, Incorporated. Care instructions adapted under license by [facility] ([facility]). If you have questions about a medical condition or this instruction, always ask your healthcare professional. [place] [address] any warranty or liability for your use of this information.        Patient Education        penicillin V potassium (oral)  Pronunciation:  PEN i STACY in V al TAS ee um  What is the most important information I should know about penicillin V potassium? You should not be treated with this medicine if you are allergic to penicillin. What is penicillin V potassium? Penicillin V potassium is a slow-onset antibiotic that is used to treat many types of mild to moderate infections caused by bacteria, including scarlet fever, pneumonia, skin infections, and infections affecting the nose, mouth, or throat. Penicillin V potassium is also used to prevent the symptoms of rheumatic fever. Penicillin V potassium is also used to prevent infections of the heart valves in people with certain heart conditions who need to have dental work or surgery. Penicillin V potassium may also be used for purposes not listed in this medication guide. What should I discuss with my healthcare provider before taking penicillin V potassium? You should not be treated with this medicine if you are allergic to penicillin. Tell your doctor if you have ever had:  · an allergic reaction to a cephalosporin antibiotic (Keflex, Omnicef, and others);  · any type of allergy;  · asthma or breathing problems;  · a stomach or intestinal disorder;  · heart disease; or  · kidney disease. If you have stomach problems or are sick with severe vomiting or diarrhea, your medication may not be as effective. Penicillin V potassium oral liquid may contain phenylalanine. Tell your doctor if you have phenylketonuria (PKU). Tell your doctor if you are pregnant or breastfeeding. Do not give this medicine to a child without medical advice. How should I take penicillin V potassium? Follow all directions on your prescription label and read all medication guides or instruction sheets. Use the medicine exactly as directed. You may take penicillin V potassium with or without food. Shake the oral liquid before you measure a dose. Use the dosing syringe provided, or use a medicine dose-measuring device (not a kitchen spoon).   When given before surgery or dental work, penicillin V potassium is usually taken 1 hour before and 6 hours after the procedure. Follow your doctor's dosing instructions very carefully. Use this medicine for the full prescribed length of time, even if your symptoms quickly improve. Skipping doses can increase your risk of infection that is resistant to medication. Penicillin V potassium will not treat a viral infection such as the flu or a common cold. After you have finished all doses, your doctor may want to do tests to make sure your infection has completely cleared up. Store the tablets  at room temperature away from moisture, heat, and light. Store the liquid in a refrigerator. Do not freeze. Throw away any unused liquid after 14 days. What happens if I miss a dose? Take the medicine as soon as you can, but skip the missed dose if it is almost time for your next dose. Do not take two doses at one time. What happens if I overdose? Seek emergency medical attention or call the Poison Help line at 1-980.768.8284. What should I avoid while taking penicillin V potassium? Do not share this medicine with another person, even if they have the same symptoms you have. Antibiotic medicines can cause diarrhea, which may be a sign of a new infection. If you have diarrhea that is watery or bloody, call your doctor before using anti-diarrhea medicine. What are the possible side effects of penicillin V potassium? Get emergency medical help if you have signs of an allergic reaction: hives; fever, chills, joint pain; difficult breathing; swelling of your face, lips, tongue, or throat. Call your doctor at once if you have:  · severe stomach pain, diarrhea that is watery or bloody (even if it occurs months after your last dose);  · easy bruising or bleeding;  · pale or yellowed skin, dark colored urine;  · numbness, tingling, or burning pain;  · urination problems; or  · fever, swollen glands, itching, joint pain, or not feeling well.   Common side effects may include:  · nausea, vomiting, upset stomach;  · diarrhea;  · swollen, black, or \"hairy\" tongue;  · rash; or  · vaginal itching or discharge. This is not a complete list of side effects and others may occur. Call your doctor for medical advice about side effects. You may report side effects to FDA at 5-904-QPW-0680. What other drugs will affect penicillin V potassium? Penicillin V potassium can make birth control pills less effective. Ask your doctor about using a non-hormonal birth control (condom, diaphragm with spermicide) to prevent pregnancy. Other drugs may affect penicillin V potassium, including prescription and over-the-counter medicines, vitamins, and herbal products. Tell your doctor about all your current medicines and any medicine you start or stop using. Where can I get more information? Your doctor or pharmacist can provide more information about penicillin V potassium. Remember, keep this and all other medicines out of the reach of children, never share your medicines with others, and use this medication only for the indication prescribed. Every effort has been made to ensure that the information provided by [address] is accurate, up-to-date, and complete, but no guarantee is made to that effect. Drug information contained herein may be time sensitive. [facility] information has been compiled for use by healthcare practitioners and consumers in the [place] and therefore [facility] does not warrant that uses outside of the [place] are appropriate, unless specifically indicated otherwise. [facility]'s drug information does not endorse drugs, diagnose patients or recommend therapy.  [facility] drug information is an informational resource designed to assist licensed healthcare practitioners in caring for their patients and/or to serve consumers viewing this service as a supplement to, and not a substitute for, the expertise, skill, knowledge and judgment of healthcare practitioners. The absence of a warning for a given drug or drug combination in no way should be construed to indicate that the drug or drug combination is safe, effective or appropriate for any given patient. [facility] does not assume any responsibility for any aspect of healthcare administered with the aid of information [facility] provides. The information contained herein is not intended to cover all possible uses, directions, precautions, warnings, drug interactions, allergic reactions, or adverse effects. If you have questions about the drugs you are taking, check with your doctor, nurse or pharmacist.  Copyright 8703-7376 [address]: 2.01. Revision date: 9/16/2019. Care instructions adapted under license by [facility] ([facility]). If you have questions about a medical condition or this instruction, always ask your healthcare professional. [place] [address] any warranty or liability for your use of this information. · DO NOT return to school,  or work until on antibiotic for 24 hours  · Start using a new toothbrush after 24 hours on antibiotic therapy  · Avoid kissing, sharing utensils or food until infection has resolved  · Practice meticulous handwashing to prevent spread of infection  · Continue antibiotic as prescribed until all doses are completed  · Probiotic OTC or greek yogurt daily while on antibiotic  · Tylenol/Ibuprofen OTC PRN as directed on package for pain, discomfort or fever  · Encouraged to increase fluids and rest  · Avoid salty, spicy or acidic foods or beverages  · Soft diet until sore throat subsides  · Warm salt water gargles for sore throat  · Cepacol lozenges, chloraseptic spray OTC q 1-2 hrs PRN for sore throat  · Patient instructions given for tonsillitis and penicillin v.  · To ER or call 911 if any difficulty breathing, shortness of breath, inability to swallow, hives, rash, facial/tongue swelling or temp greater than 103 degrees.   · Follow up with PCP or Walk in Care as needed if symptoms worsen or do not improve.

## 2022-01-31 NOTE — PROGRESS NOTES
[id number] [facility] [address]  Dept: [phone]  Dept Fax: [phone]     Medhat Mccollum is a 39 y.o. male who presents to the [facility] in Care today for hismedical conditions/complaints as noted below. Medhat Mccollum is c/o of Otalgia (x 3 days bilateral ear pian, sore thraot, fever, and lesion on roof of mouth )      HPI:     Otalgia   There is pain in both ears. This is a new problem. The current episode started in the past 7 days (Started on Saturday with B/L ear pain, sore throat, fever and blister on mouth. Denies known exposure to Covid-19. Works at [employer]. Feels like ryan Chahal ). The problem occurs every few minutes. The problem has been gradually worsening. There has been no fever. The pain is at a severity of 10/10 (sore throat). The pain is severe. Associated symptoms include coughing (Little bit.) and a sore throat. Pertinent negatives include no diarrhea, ear discharge, headaches, rash, rhinorrhea or vomiting. Associated symptoms comments: Dizzy. Treatments tried: OTC cold medicine and cough drups. The treatment provided no relief. His past medical history is significant for a chronic ear infection, hearing loss and a tympanostomy tube.           Past Medical History:   Diagnosis Date    Bipolar 1 disorder ([facility] [address]     Hypertension     Kidney stones     Obesity     Pre-diabetes         Current Outpatient Medications   Medication Sig Dispense Refill    penicillin v potassium (VEETID) 500 MG tablet Take 1 tablet by mouth 2 times daily for 10 days 20 tablet 0    acetaminophen (TYLENOL) 325 MG tablet Take 2 tablets by mouth every 6 hours as needed (post-op pain) (Patient not taking: Reported on 1/31/2022) 1 tablet 0    ibuprofen (ADVIL;MOTRIN) 800 MG tablet Take 800 mg by mouth every 8 hours as needed for Pain (Patient not taking: Reported on 1/31/2022)      Brompheniramine-Phenylephrine (COLD & ALLERGY PO) Take by mouth (Patient not taking: Reported on 1/31/2022)       No current facility-administered medications for this visit. No Known Allergies    Subjective:     Review of Systems   Constitutional: Positive for appetite change (Hard to swallow and eat. Drinking lots of fluid.), chills, diaphoresis, fatigue and fever. HENT: Positive for ear pain and sore throat. Negative for congestion, ear discharge and rhinorrhea. Respiratory: Positive for cough (Little bit.). Negative for shortness of breath and wheezing. Gastrointestinal: Negative for diarrhea, nausea and vomiting. Skin: Negative for rash and wound. Neurological: Positive for dizziness. Negative for light-headedness and headaches. Objective:      Physical Exam  Vitals and nursing note reviewed. Constitutional:       General: He is not in acute distress. Appearance: Normal appearance. He is well-developed. He is not ill-appearing or diaphoretic. Comments: Well hydrated, nontoxic appearance. HENT:      Head: Normocephalic and atraumatic. Right Ear: Hearing, tympanic membrane, ear canal and external ear normal. No drainage or swelling. No middle ear effusion. No mastoid tenderness. Tympanic membrane is not erythematous or bulging. Left Ear: Hearing, tympanic membrane, ear canal and external ear normal. No drainage or swelling. No middle ear effusion. No mastoid tenderness. Tympanic membrane is not erythematous or bulging. Nose: Nose normal.      Right Sinus: No maxillary sinus tenderness or frontal sinus tenderness. Left Sinus: No maxillary sinus tenderness or frontal sinus tenderness. Mouth/Throat:      Lips: Pink. Mouth: Mucous membranes are moist. Injury: Has no teeth or dentures. Dentition: Abnormal dentition. Does not have dentures. Gum lesions (Small raised area on anterior upper mid gum line with slight purplish discoloration.   No abscess, bleeding, drainage or seeping. Tender to touch.) present. No gingival swelling or dental abscesses. Pharynx: Uvula midline. Pharyngeal swelling and posterior oropharyngeal erythema present. No oropharyngeal exudate or uvula swelling. Tonsils: No tonsillar exudate or tonsillar abscesses. 2+ on the right. 2+ on the left. Eyes:      General: No scleral icterus. Right eye: No discharge. Left eye: No discharge. Conjunctiva/sclera: Conjunctivae normal.      Pupils: Pupils are equal, round, and reactive to light. Cardiovascular:      Rate and Rhythm: Normal rate and regular rhythm. Heart sounds: Normal heart sounds, S1 normal and S2 normal. No murmur heard. No friction rub. No gallop. Pulmonary:      Effort: Pulmonary effort is normal. No accessory muscle usage or respiratory distress. Breath sounds: Normal breath sounds and air entry. No decreased breath sounds, wheezing, rhonchi or rales. Comments: No cough during exam.  Breath sounds clear B/L anterior and posterior lobes. Chest expansion symmetrical.  No audible wheezing or respiratory distress. No rales or rhonchi. Chest:      Chest wall: No tenderness. Abdominal:      General: Bowel sounds are normal.      Palpations: Abdomen is soft. Tenderness: There is no abdominal tenderness. Musculoskeletal:         General: Normal range of motion. Lymphadenopathy:      Cervical: Cervical adenopathy present. Right cervical: Superficial cervical adenopathy present. No posterior cervical adenopathy. Left cervical: Superficial cervical adenopathy present. No posterior cervical adenopathy. Skin:     General: Skin is warm and dry. Coloration: Skin is not pale. Findings: No erythema or rash. Neurological:      Mental Status: He is alert and oriented to person, place, and time. Psychiatric:         Behavior: Behavior normal. Behavior is cooperative.        BP (!) 148/108   Pulse 99 Temp 98.6 °F (37 °C) (Oral)   Resp 22   Ht 5' 11\" (1.803 m)   Wt (!) 325 lb 11.2 oz (147.7 kg)   SpO2 98%   BMI 45.43 kg/m²     Results for orders placed or performed in visit on 01/31/22   POCT rapid strep A   Result Value Ref Range    Strep A Ag None Detected None Detected     Assessment:      Diagnosis Orders   1. Tonsillitis  penicillin v potassium (VEETID) 500 MG tablet   2. Sore throat  POCT rapid strep A       Plan:      Return if symptoms worsen or fail to improve, for Resume all previous medications as directed. Orders Placed This Encounter   Medications    penicillin v potassium (VEETID) 500 MG tablet     Sig: Take 1 tablet by mouth 2 times daily for 10 days     Dispense:  20 tablet     Refill:  0      · DO NOT return to school,  or work until on antibiotic for 24 hours  · Start using a new toothbrush after 24 hours on antibiotic therapy  · Avoid kissing, sharing utensils or food until infection has resolved  · Practice meticulous handwashing to prevent spread of infection  · Continue antibiotic as prescribed until all doses are completed  · Probiotic OTC or greek yogurt daily while on antibiotic  · Tylenol/Ibuprofen OTC PRN as directed on package for pain, discomfort or fever  · Encouraged to increase fluids and rest  · Avoid salty, spicy or acidic foods or beverages  · Soft diet until sore throat subsides  · Warm salt water gargles for sore throat  · Cepacol lozenges, chloraseptic spray OTC q 1-2 hrs PRN for sore throat  · Patient instructions given for tonsillitis and penicillin v.  · To ER or call 911 if any difficulty breathing, shortness of breath, inability to swallow, hives, rash, facial/tongue swelling or temp greater than 103 degrees. · Follow up with PCP or Walk in Care as needed if symptoms worsen or do not improve. Florence Huang received counseling on the following healthy behaviors: medication adherence. Patient given educational materials - see patient instructions. Discussed use,benefit, and side effects of prescribed medications. Treatment plan discussed at visit. Continue routine health care follow up. All patient questions answered. Pt voiced understanding.       Electronically signed by TOMMIE Patel CNP on 1/31/2022 at 8:35 PM

## 2022-01-31 NOTE — LETTER
[facility] [address]  Phone: [phone]  Fax: Maxine Esparza, APRN - CNP        January 31, 2022     Patient: Melita Staton   YOB: 1985   Date of Visit: 1/31/2022       To Whom it May Concern:    Karrie Bundy was seen in my clinic on 1/31/2022. He may return to work on 02/02/2022. Please excuse for 01/31/2022 and 02/02/2022. If you have any questions or concerns, please don't hesitate to call.     Sincerely,         Abiodun Lemus, APRN - CNP

## 2022-05-23 ENCOUNTER — OFFICE VISIT (OUTPATIENT)
Dept: PRIMARY CARE CLINIC | Age: 37
End: 2022-05-23
Payer: COMMERCIAL

## 2022-05-23 VITALS
HEART RATE: 79 BPM | DIASTOLIC BLOOD PRESSURE: 85 MMHG | OXYGEN SATURATION: 95 % | BODY MASS INDEX: 44.1 KG/M2 | HEIGHT: 71 IN | RESPIRATION RATE: 18 BRPM | TEMPERATURE: 98.4 F | WEIGHT: 315 LBS | SYSTOLIC BLOOD PRESSURE: 128 MMHG

## 2022-05-23 DIAGNOSIS — I83.93 ASYMPTOMATIC VARICOSE VEINS OF BOTH LOWER EXTREMITIES: ICD-10-CM

## 2022-05-23 DIAGNOSIS — I83.891 VARICOSE VEINS OF RIGHT LOWER EXTREMITY WITH EDEMA: ICD-10-CM

## 2022-05-23 DIAGNOSIS — I83.892 VARICOSE VEINS OF LEFT LOWER EXTREMITY WITH EDEMA: ICD-10-CM

## 2022-05-23 DIAGNOSIS — L03.115 CELLULITIS OF LEG WITHOUT FOOT, RIGHT: Primary | ICD-10-CM

## 2022-05-23 PROCEDURE — G8427 DOCREV CUR MEDS BY ELIG CLIN: HCPCS | Performed by: NURSE PRACTITIONER

## 2022-05-23 PROCEDURE — G8417 CALC BMI ABV UP PARAM F/U: HCPCS | Performed by: NURSE PRACTITIONER

## 2022-05-23 PROCEDURE — 99213 OFFICE O/P EST LOW 20 MIN: CPT | Performed by: NURSE PRACTITIONER

## 2022-05-23 PROCEDURE — 4004F PT TOBACCO SCREEN RCVD TLK: CPT | Performed by: NURSE PRACTITIONER

## 2022-05-23 RX ORDER — CEPHALEXIN 500 MG/1
500 CAPSULE ORAL 4 TIMES DAILY
Qty: 28 CAPSULE | Refills: 0 | Status: SHIPPED | OUTPATIENT
Start: 2022-05-23 | End: 2022-05-30

## 2022-05-23 NOTE — PROGRESS NOTES
Chief Complaint:   Rash Jeanie Million Yesterday-Rash on right lower leg, started on shin and is now wraping around leg. Also a small spot on left leg. Legs have been sore and cramping. )      History of Present Illness   Source of history provided by:  patient and spouse/SO. Lucy Garsia is a 39 y.o. old male who has a past medical history of:   Past Medical History:   Diagnosis Date    Bipolar 1 disorder ([facility] [address]     Hypertension     Kidney stones     Obesity     Pre-diabetes     Presents to the walk in clinic for evaluation of a rash to the right lower leg that he noticed yesterday after getting up out of bed. According to Nawaf Lozano, he has been experiencing bilateral lower leg swelling for some time. Yesterday, he began to having \"achy\" legs. He noticed a rash to the right leg and reports the area is painful. He denies any known injury, insect bites, new socks, drainage or bleeding from the area. His wife, is concerned for a cellulitis. Review of record is significant for prediabetes as well as hypertension and obesity. According to wife, they currently do not have a PCP and are trying to find a new one. ROS   Unless otherwise stated in this report or unable to obtain because of the patient's clinical or mental status as evidenced by the medical record, this patients's positive and negative responses for Review of Systems, constitutional, psych, eyes, ENT, cardiovascular, respiratory, gastrointestinal, neurological, genitourinary, musculoskeletal, integument systems and systems related to the presenting problem are either stated in the preceding or were not pertinent or were negative for the symptoms and/or complaints related to the medical problem. Past Surgical History:  has a past surgical history that includes Umbilical hernia repair; Tympanostomy tube placement; Tympanostomy tube placement; Testicle removal; Tonsillectomy; and tympanomastoidectomy (Left, 5/4/2021).   Social History: reports that he has been smoking cigarettes. He has been smoking about 0.50 packs per day. He has quit using smokeless tobacco.  His smokeless tobacco use included snuff and chew. He reports that he does not drink alcohol and does not use drugs. Family History: family history is not on file. Allergies: Patient has no known allergies. Physical Exam     VS:  /85 (Site: Left Upper Arm, Position: Sitting, Cuff Size: Large Adult)   Pulse 79   Temp 98.4 °F (36.9 °C) (Oral)   Resp 18   Ht 5' 11\" (1.803 m)   Wt (!) 325 lb (147.4 kg)   SpO2 95%   BMI 45.33 kg/m²        Constitutional:  Alert, development consistent with age. HEENT:  NC/NT. Airway patent. Eyes:  PERRL, EOMI, no discharge. Lungs:  CTAB, no wheezing, rales, or rhonchi  Heart:  RRR without pathologic murmurs. Negative Homans signs bilaterally. Skin:  Normal turgor and appropriately dry to touch. Erythematous, blanching, macular rash noted over the right lower leg. Area is TTP without pustules, induration, or fluctuance. No bleeding or discharge noted. No lymphangitic streaking. Distal pulses and sensation are intact, he does have moderate nonpitting edema to bilateral lower extremities/ankles. Varicosities noted to the posterior knees and lower extremities bilaterally. No hair. Neurological:  Orientation age-appropriate unless noted elsewhere. Motor functions intact. Lab / Imaging Results   (All laboratory and radiology results have been personally reviewed by myself)  Labs:      Imaging: All Radiology results interpreted by Radiologist unless otherwise noted. Medical Decision Making   Pt non-toxic, in no apparent distress and stable at time of discharge. Assessment / Plan   Impression(s):  Lee Velez was seen today for rash. Diagnoses and all orders for this visit:    Cellulitis of leg without foot, right  -     cephALEXin (KEFLEX) 500 MG capsule;  Take 1 capsule by mouth 4 times daily for 7 days    Varicose veins of left lower extremity with edema  -     Compression Stockings MISC; by Does not apply route    Varicose veins of right lower extremity with edema  -     Compression Stockings MISC; by Does not apply route    Asymptomatic varicose veins of both lower extremities      - 39y.o. year old male presenting with concern for cellulitis.    -Discussed with Kali Miquel and wife differential diagnoses including cellulitis, venous stasis, contact dermatitis. Due to history of prediabetes and area being painful to touch will treat for concern for cellulitis with cephalexin; administration/ADRs/probiotics discussed.  Compression stockings and elevation of the lower extremities encouraged.  Referred to [facility] of primary care office to establish care. -  ED sooner if symptoms worsen or change.      Jaun Gibbons, APRN - CNP

## 2022-05-23 NOTE — PATIENT INSTRUCTIONS
Patient Education      Patient Education      Patient Education      Patient Education        Cellulitis: Care Instructions  Your Care Instructions     Cellulitis is a skin infection caused by bacteria, most often strep or staph. It often occurs after a break in the skin from a scrape, cut, bite, orpuncture, or after a rash. Cellulitis may be treated without doing tests to find out what caused it. But your doctor may do tests, if needed, to look for a specific bacteria, like methicillin-resistant Staphylococcus aureus (MRSA). The doctor has checked you carefully, but problems can develop later. If you notice any problems or new symptoms, get medical treatment right away. Follow-up care is a key part of your treatment and safety. Be sure to make and go to all appointments, and call your doctor if you are having problems. It's also a good idea to know your test results and keep alist of the medicines you take. How can you care for yourself at home?  Take your antibiotics as directed. Do not stop taking them just because you feel better. You need to take the full course of antibiotics.  Prop up the infected area on pillows to reduce pain and swelling. Try to keep the area above the level of your heart as often as you can.  If your doctor told you how to care for your wound, follow your doctor's instructions. If you did not get instructions, follow this general advice:  ? Wash the wound with clean water 2 times a day. Don't use hydrogen peroxide or alcohol, which can slow healing. ? You may cover the wound with a thin layer of petroleum jelly, such as Vaseline, and a nonstick bandage. ? Apply more petroleum jelly and replace the bandage as needed.  Be safe with medicines. Take pain medicines exactly as directed. ? If the doctor gave you a prescription medicine for pain, take it as prescribed.   ? If you are not taking a prescription pain medicine, ask your doctor if you can take an over-the-counter medicine. To prevent cellulitis in the future   Try to prevent cuts, scrapes, or other injuries to your skin. Cellulitis most often occurs where there is a break in the skin.  If you get a scrape, cut, mild burn, or bite, wash the wound with clean water as soon as you can to help avoid infection. Don't use hydrogen peroxide or alcohol, which can slow healing.  If you have swelling in your legs (edema), support stockings and good skin care may help prevent leg sores and cellulitis.  Take care of your feet, especially if you have diabetes or other conditions that increase the risk of infection. Wear shoes and socks. Do not go barefoot. If you have athlete's foot or other skin problems on your feet, talk to your doctor about how to treat them. When should you call for help? Call your doctor now or seek immediate medical care if:     You have signs that your infection is getting worse, such as:  ? Increased pain, swelling, warmth, or redness. ? Red streaks leading from the area. ? Pus draining from the area. ? A fever.      You get a rash. Watch closely for changes in your health, and be sure to contact your doctor if:     You do not get better as expected. Where can you learn more? Go to https://Health Discovery.[employer]. org and sign in to your Zhaogang account. Enter N132 in the [employer] box to learn more about \"Cellulitis: Care Instructions. \"     If you do not have an account, please click on the \"Sign Up Now\" link. Current as of: November 15, 2021               Content Version: 13.2  © 2006-2022 Healthwise, Incorporated. Care instructions adapted under license by [facility] ([facility]). If you have questions about a medical condition or this instruction, always ask your healthcare professional. [place] [address] any warranty or liability for your use of this information.          cephalexin  Pronunciation: sef a JAQUELINE in  Brand: Keflex  What is the most important information I should know about cephalexin? You should not use this medicine if you are allergic to cephalexin or to similar antibiotics, such as Ceftin, Cefzil, Omnicef, and others. Tell your doctor if you are allergic to any drugs, especially penicillins or otherantibiotics. What is cephalexin? Cephalexin is a cephalosporin (SEF a low spor in) antibiotic that is used totreat bacterial infections of the lungs, ear, skin, bones, bladder, and kidneys. Cephalexin is used to treat infections in adults and children who are at [address] year old. Cephalexin may also be used for purposes not listed in this medication guide. What should I discuss with my healthcare provider before taking cephalexin? You should not use this medicine if you are allergic to cephalexin or any other cephalosporin antibiotic (cefdinir, cefadroxil, cefoxitin, cefprozil,ceftriaxone, cefuroxime, Omnicef, and others). Tell your doctor if you have ever had:   an allergy to any drug (especially penicillin);   liver or kidney disease; or   intestinal problems, such as colitis. The liquid form of cephalexin may contain sugar. This may affect you if youhave diabetes. Tell your doctor if you are pregnant or breast-feeding. How should I take cephalexin? Follow all directions on your prescription label and read all medication guidesor instruction sheets. Use the medicine exactly as directed. Do not use cephalexin to treat any condition that has not been checked by yourdoctor. Measure liquid medicine carefully. Use the dosing syringe provided, or use a medicine dose-measuringdevice (not a kitchen spoon). Use this medicine for the full prescribed length of time, even if your symptoms quickly improve. Skipping doses can increase your risk of infection that is resistant to medication. Cephalexin will not treat a viral infection such asthe flu or a common cold.   Do not share cephalexin with another person, even if they have the same symptoms you have.  This medicine can affect the results of certain medical tests. Tell any doctorwho treats you that you are using cephalexin. Store the tablets and capsules at room temperature away from moisture, heat, and light. Store the liquid medicine in the refrigerator. Throw away any unused liquid after 14 days. What happens if I miss a dose? Take the medicine as soon as you can, but skip the missed dose if it is almost time for your next dose. Do not take two doses at one time. What happens if I overdose? Seek emergency medical attention or call the Poison Help line at 1-462.305.5420. Overdose symptoms may include nausea, vomiting, stomach pain, diarrhea, andblood in your urine. What should I avoid while taking cephalexin? Antibiotic medicines can cause diarrhea, which may be a sign of a new infection. If you have diarrhea that is watery or bloody, call your doctor before using anti-diarrhea medicine. What are the possible side effects of cephalexin? Get emergency medical help if you have signs of an allergic reaction (hives, difficult breathing, swelling in your face or throat) or a severe skin reaction (fever, sore throat, burning eyes, skin pain, red or purple skin rash withblistering and peeling). Call your doctor at once if you have:   severe stomach pain, diarrhea that is watery or bloody (even if it occurs months after your last dose);   unusual tiredness, feeling light-headed or short of breath;   easy bruising, unusual bleeding, purple or red spots under your skin;   a seizure;   pale skin, cold hands and feet;   yellowed skin, dark colored urine;   fever, weakness; or   pain in your side or lower back, painful urination. Common side effects may include:   diarrhea;   nausea, vomiting;   indigestion, stomach pain; or   vaginal itching or discharge. This is not a complete list of side effects and others may occur. Call your doctor for medical advice about side effects.  You may report side effects toFDA at 1-458-FDA-1088. What other drugs will affect cephalexin? Tell your doctor about all your other medicines, especially:   metformin; or   probenecid. This list is not complete. Other drugs may affect cephalexin, including prescription and over-the-counter medicines, vitamins, and herbal products. Notall possible drug interactions are listed here. Where can I get more information? Your pharmacist can provide more information about cephalexin. Remember, keep this and all other medicines out of the reach of children, never share your medicines with others, and use this medication only for the indication prescribed. Every effort has been made to ensure that the information provided by [address] is accurate, up-to-date, and complete, but no guarantee is made to that effect. Drug information contained herein may be time sensitive. [facility] information has been compiled for use by healthcare practitioners and consumers in the [place] and therefore [facility] does not warrant that uses outside of the [place] are appropriate, unless specifically indicated otherwise. [facility]'s drug information does not endorse drugs, diagnose patients or recommend therapy. [facility] drug information is an informational resource designed to assist licensed healthcare practitioners in caring for their patients and/or to serve consumers viewing this service as a supplement to, and not a substitute for, the expertise, skill, knowledge and judgment of healthcare practitioners. The absence of a warning for a given drug or drug combination in no way should be construed to indicate that the drug or drug combination is safe, effective or appropriate for any given patient. [facility] does not assume any responsibility for any aspect of healthcare administered with the aid of information [facility] provides.  The information contained herein is not intended to cover all possible uses, directions, precautions, warnings, drug interactions, allergic reactions, or adverse effects. If you have questions about the drugs you are taking, check with yourdoctor, nurse or pharmacist.  Copyright 6591-8016 [address]: 10.03. Revision date: 1/4/2021. Care instructions adapted under license by [facility] ([facility]). If you have questions about a medical condition or this instruction, always ask your healthcare professional. [place] [address] any warranty or liability for your use of this information. Learning About Using Compression Stockings  What are compression stockings? Compression stockings may be used for problems like varicose veins, skin ulcers, and deep vein thrombosis. But there are different types of stockings,and they need to fit right. So your doctor will recommend what you need. These stockings are the most common treatment for varicose veins. They help the blood circulate in your legs. This can prevent skin ulcers and keep blood frombuilding up in the legs. Prescription stockings are tightest at the foot. They get less and less tightfarther up on your legs. The kind you buy without a prescription have lighter elastic. So the pressure is even all the way up the leg. These don't cost as much. But they don't provide the compression you need to treat serious symptoms or prevent skinulcers. How do you use compression stockings?  If your stockings are new, you may want to wash them before you put them on. This can make them more flexible and easier to put on. It's a good idea to wash them by hand.  Most of the time it's best to put on your stockings early in the morning. This is when you have the least swelling in your legs.  Put silicone lotion (such as ALPS) or talcum powder on your legs to help the stockings slide on.  If your stockings contain latex, or you aren't sure if they contain latex, do not use other types of lotions or creams on your legs when you wear the stockings. You may use other lotions or creams when you are not wearing the stockings.  Sit in a chair with a back while you put on the stockings. This gives you something to lean against as you pull them up.  How to put on stockings:  ? Turn your stocking inside out. Then put your toe in as far as it will go.  ? Readjust the stocking by folding it back onto itself at the ankle. Then hold both sides of the folded stocking. ? Pull toward your body as far as you can.  ? Fold back the stocking again farther up on your leg. Then pull the stocking up to that point. ? Repeat folding back and pulling until the stocking is in the right place.  You may want to wear rubber gloves. They can help you hold the stockings better.  If your stockings don't have toes, use a silk \"slip sock. \" (You can get one from your medical supplier.) This will help the stocking slide over your foot better. When you're done, pull off the sock through the open toe.  If you still can't get your stockings on, you may want to use a \"stocking osman. \" This is a metal device that holds the stocking open while you step into it. It is often recommended for people who have problems grasping, leaning, or pulling. Before you buy one, try it first. Some people find them hard to use. What else should you know about compression stockings?  You will probably need to buy a new pair every 4 to 6 months.  They may feel tight or uncomfortable at first, but many people get used to them.  It is important to think about the pros and cons of stockings. You may not like them. But they may help relieve symptoms. Where can you learn more? Go to https://NETpeaspepicewSnowGate.Eve. org and sign in to your [employer]Comb account. Enter J166 in the [employer] box to learn more about \"Learning About Using Compression Stockings. \"     If you do not have an account, please click on the \"Sign Up Now\" link.   Current as of: July 6, 2021               Content Version: 13.2  © 2006-2022 [employer]. Care instructions adapted under license by [facility] ([facility]). If you have questions about a medical condition or this instruction, always ask your healthcare professional. [address] any warranty or liability for your use of this information. Varicose Veins: Care Instructions  Your Care Instructions  Varicose veins are twisted, enlarged veins near the surface of the skin. Theydevelop most often in the legs and ankles. Some people may be more likely than others to get varicose veins because of aging or hormone changes or because a parent has them. Being overweight or pregnant can make varicose veins worse. Jobs that require standing for longperiods of time also can make them worse. Follow-up care is a key part of your treatment and safety. Be sure to make and go to all appointments, and call your doctor if you are having problems. It's also a good idea to know your test results and keep alist of the medicines you take. How can you care for yourself at home?  Wear compression stockings during the day to help relieve symptoms. They improve blood flow and are the main treatment for varicose veins. Talk to your doctor about which ones to get and where to get them.  Prop up your legs at or above the level of your heart when possible. This helps keep the blood from pooling in your lower legs and improves blood flow to the rest of your body.  Avoid sitting and standing for long periods. This puts added stress on your veins.  Get regular exercise, and control your weight. Walk, bicycle, or swim to improve blood flow in your legs.  If you bump your leg so hard that you know it is likely to bruise, prop up your leg and put ice or a cold pack on the area for 10 to 20 minutes at a time. Try to do this every 1 to 2 hours for the next 3 days (when you are awake) or until the swelling goes down.  Put a thin cloth between the ice and your skin.  If you cut or scratch the skin over a vein, it may bleed a lot. Prop up your leg and apply firm pressure with a clean bandage over the site of the bleeding. Continue to apply pressure for a full 15 minutes. Do not check sooner to see if the bleeding has stopped. If the bleeding has not stopped after 15 minutes, apply pressure again for another 15 minutes. You can repeat this up to 3 times for a total of 45 minutes. If you have a blood clot in a varicose vein, you may have tenderness and swelling over the vein. The vein may feel firm. Be sure to call your doctor right away if you have these symptoms. If your doctor has told you how to carefor the clot, follow his or her instructions. Care may include the following:   Prop up your leg and apply heat with a warm, damp cloth or a heating pad set on low (put a towel or cloth between your leg and the heating pad to prevent burns).  Ask your doctor if you can take an over-the-counter pain medicine, such as acetaminophen (Tylenol), ibuprofen (Advil, Motrin), or naproxen (Aleve). Be safe with medicines. Read and follow all instructions on the label. When should you call for help? Call 911 anytime you think you may need emergency care. For example, call if:     You have sudden chest pain and shortness of breath, or you cough up blood. Call your doctor now or seek immediate medical care if:     You have signs of a blood clot, such as:  ? Pain in your calf, back of the knee, thigh, or groin. ? Redness and swelling in your leg or groin.      A varicose vein begins to bleed and you cannot stop it.      You have a tender lump in your leg.      You get an open sore. Watch closely for changes in your health, and be sure to contact your doctor if:     Your varicose vein symptoms do not improve with home treatment. Where can you learn more? Go to https://jeannette.[email or web address] org and sign in to your [employer] account.  Enter B637 in the [facility] box to learn more about \"Varicose Veins: Care Instructions. \"     If you do not have an account, please click on the \"Sign Up Now\" link. Current as of: July 6, 2021               Content Version: 13.2  © 4017-0457 Healthwise, Incorporated. Care instructions adapted under license by [facility] ([facility]). If you have questions about a medical condition or this instruction, always ask your healthcare professional. [address] any warranty or liability for your use of this information.

## 2022-05-23 NOTE — LETTER
[address]    [place] [address]  Phone: [phone]  Fax: [address] [facility], [address], APRN - CNP        May 23, 2022     Patient: Aurora Viera   YOB: 1985   Date of Visit: 5/23/2022       To Whom It May Concern: It is my medical opinion that Nara Silver may return to work on 05/25/2022. If you have any questions or concerns, please don't hesitate to call.     Sincerely,        Adriano Steward, TOMMIE - CNP

## 2022-05-25 ENCOUNTER — TELEPHONE (OUTPATIENT)
Dept: PRIMARY CARE CLINIC | Age: 37
End: 2022-05-25

## 2022-05-25 DIAGNOSIS — I83.893 VARICOSE VEINS OF BOTH LEGS WITH EDEMA: Primary | ICD-10-CM

## 2022-05-25 NOTE — TELEPHONE ENCOUNTER
[facility] called stating that they needed a new prescription for Christopher's compression stockings. They need it to contain the size of stockings and pressure needed. Prescription to be faxed to [phone].

## 2022-05-28 ENCOUNTER — HOSPITAL ENCOUNTER (EMERGENCY)
Age: 37
Discharge: HOME | End: 2022-05-28
Attending: EMERGENCY MEDICINE
Payer: COMMERCIAL

## 2022-05-28 VITALS
DIASTOLIC BLOOD PRESSURE: 86 MMHG | HEART RATE: 83 BPM | SYSTOLIC BLOOD PRESSURE: 145 MMHG | RESPIRATION RATE: 18 BRPM | TEMPERATURE: 98.9 F | OXYGEN SATURATION: 97 %

## 2022-05-28 DIAGNOSIS — L53.9 ERYTHEMA: Primary | ICD-10-CM

## 2022-05-28 LAB
ABSOLUTE EOS #: 0.1 K/UL (ref 0–0.4)
ABSOLUTE LYMPH #: 4.5 K/UL (ref 1–4.8)
ABSOLUTE MONO #: 0.8 K/UL (ref 0–1)
ALBUMIN SERPL-MCNC: 4.2 G/DL (ref 3.5–5.2)
ALP BLD-CCNC: 74 U/L (ref 40–129)
ALT SERPL-CCNC: 39 U/L (ref 5–41)
ANION GAP SERPL CALCULATED.3IONS-SCNC: 12 MMOL/L (ref 9–17)
AST SERPL-CCNC: 22 U/L
BASOPHILS # BLD: 1 % (ref 0–2)
BASOPHILS ABSOLUTE: 0.1 K/UL (ref 0–0.2)
BILIRUB SERPL-MCNC: 0.16 MG/DL (ref 0.3–1.2)
BUN BLDV-MCNC: 16 MG/DL (ref 6–20)
BUN/CREAT BLD: 17 (ref 9–20)
CALCIUM SERPL-MCNC: 9.4 MG/DL (ref 8.6–10.4)
CHLORIDE BLD-SCNC: 102 MMOL/L (ref 98–107)
CO2: 24 MMOL/L (ref 20–31)
CREAT SERPL-MCNC: 0.92 MG/DL (ref 0.7–1.2)
DIFFERENTIAL TYPE: YES
EOSINOPHILS RELATIVE PERCENT: 1 % (ref 0–5)
GFR AFRICAN AMERICAN: >60 ML/MIN
GFR NON-AFRICAN AMERICAN: >60 ML/MIN
GFR SERPL CREATININE-BSD FRML MDRD: ABNORMAL ML/MIN/{1.73_M2}
GLUCOSE BLD-MCNC: 115 MG/DL (ref 70–99)
HCT VFR BLD CALC: 44 % (ref 41–53)
HEMOGLOBIN: 14.9 G/DL (ref 13.5–17.5)
LYMPHOCYTES # BLD: 35 % (ref 13–44)
MCH RBC QN AUTO: 30.6 PG (ref 26–34)
MCHC RBC AUTO-ENTMCNC: 33.8 G/DL (ref 31–37)
MCV RBC AUTO: 90.5 FL (ref 80–100)
MONOCYTES # BLD: 6 % (ref 5–9)
PDW BLD-RTO: 13.5 % (ref 12.1–15.2)
PLATELET # BLD: 198 K/UL (ref 140–450)
POTASSIUM SERPL-SCNC: 3.9 MMOL/L (ref 3.7–5.3)
RBC # BLD: 4.85 M/UL (ref 4.5–5.9)
SEG NEUTROPHILS: 57 % (ref 39–75)
SEGMENTED NEUTROPHILS ABSOLUTE COUNT: 7.5 K/UL (ref 2.1–6.5)
SODIUM BLD-SCNC: 138 MMOL/L (ref 135–144)
TOTAL PROTEIN: 6.8 G/DL (ref 6.4–8.3)
WBC # BLD: 12.9 K/UL (ref 3.5–11)

## 2022-05-28 PROCEDURE — 99283 EMERGENCY DEPT VISIT LOW MDM: CPT

## 2022-05-28 PROCEDURE — 85025 COMPLETE CBC W/AUTO DIFF WBC: CPT

## 2022-05-28 PROCEDURE — 80053 COMPREHEN METABOLIC PANEL: CPT

## 2022-05-28 PROCEDURE — 36415 COLL VENOUS BLD VENIPUNCTURE: CPT

## 2022-05-28 RX ORDER — NAPROXEN 500 MG/1
500 TABLET ORAL 2 TIMES DAILY
Qty: 10 TABLET | Refills: 0 | Status: SHIPPED | OUTPATIENT
Start: 2022-05-28 | End: 2022-06-08 | Stop reason: ALTCHOICE

## 2022-05-28 RX ORDER — TRAMADOL HYDROCHLORIDE 50 MG/1
50 TABLET ORAL EVERY 6 HOURS PRN
Qty: 12 TABLET | Refills: 0 | Status: SHIPPED | OUTPATIENT
Start: 2022-05-28 | End: 2022-05-31

## 2022-05-28 ASSESSMENT — PAIN SCALES - GENERAL: PAINLEVEL_OUTOF10: 3

## 2022-05-28 ASSESSMENT — PAIN - FUNCTIONAL ASSESSMENT: PAIN_FUNCTIONAL_ASSESSMENT: 0-10

## 2022-05-28 NOTE — Clinical Note
Marta Keen was seen and treated in our emergency department on 5/28/2022. He may return to work on 05/31/2022. If you have any questions or concerns, please don't hesitate to call.       John Tello MD

## 2022-05-28 NOTE — ED PROVIDER NOTES
[facility] ED  [address], [address]  Phone: [place] [phone] COMPLAINT    Chief Complaint   Patient presents with    Cellulitis     bilateral lower ankle cellulitis       HPI    Page Part is a 39 y.o. male who presents with noted complaint. Patient's been doing okay. Developed some redness of his right leg. Now is on his left leg. Started on some Keflex for possible cellulitis. No other associate symptoms although does have some leg pain. Denies chest pain shortness of breath or other problems. PAST MEDICAL HISTORY    Past Medical History:   Diagnosis Date    Bipolar 1 disorder ([facility] [address]     Hypertension     Kidney stones     Obesity     Pre-diabetes        SURGICAL HISTORY    Past Surgical History:   Procedure Laterality Date    TESTICLE REMOVAL      TONSILLECTOMY      TYMPANOMASTOIDECTOMY Left 5/4/2021    TYMPANOMASTOIDECTOMY performed by Marilia Villarreal MD at [address] [facility]         CURRENT MEDICATIONS    Current Outpatient Rx   Medication Sig Dispense Refill    naproxen (NAPROSYN) 500 MG tablet Take 1 tablet by mouth 2 times daily for 5 days 10 tablet 0    traMADol (ULTRAM) 50 MG tablet Take 1 tablet by mouth every 6 hours as needed for Pain for up to 3 days. Intended supply: 3 days. Take lowest dose possible to manage pain 12 tablet 0    Compression Stockings MISC by Does not apply route Knee High Stockings  20 mmHg 1 each 0    cephALEXin (KEFLEX) 500 MG capsule Take 1 capsule by mouth 4 times daily for 7 days 28 capsule 0       ALLERGIES    No Known Allergies    FAMILY HISTORY    No family history on file.     SOCIAL HISTORY    Social History     Socioeconomic History    Marital status:      Spouse name: Not on file    Number of children: Not on file    Years of education: Not on file    Highest education level: Not on file   Occupational History    Not on file   Tobacco Use    Smoking status: Current Every Day Smoker     Packs/day: 0.50     Types: Cigarettes    Smokeless tobacco: Former User     Types: Snuff, Chew   Vaping Use    Vaping Use: Never used   Substance and Sexual Activity    Alcohol use: No    Drug use: Never    Sexual activity: Not on file   Other Topics Concern    Not on file   Social History Narrative    Not on file     Social Determinants of Health     Financial Resource Strain:     Difficulty of Paying Living Expenses: Not on file   Food Insecurity:     Worried About Running Out of Food in the Last Year: Not on file    Silas of Food in the Last Year: Not on file   Transportation Needs:     Lack of Transportation (Medical): Not on file    Lack of Transportation (Non-Medical): Not on file   Physical Activity:     Days of Exercise per Week: Not on file    Minutes of Exercise per Session: Not on file   Stress:     Feeling of Stress : Not on file   Social Connections:     Frequency of Communication with Friends and Family: Not on file    Frequency of Social Gatherings with Friends and Family: Not on file    Attends Religion Services: Not on file    Active Member of [address] [place] [address] [employer] or Organizations: Not on file    Attends Club or Organization Meetings: Not on file    Marital Status: Not on file   Intimate Partner Violence:     Fear of Current or Ex-Partner: Not on file    Emotionally Abused: Not on file    Physically Abused: Not on file    Sexually Abused: Not on file   Housing Stability:     Unable to Pay for Housing in the Last Year: Not on file    Number of [place] in the Last Year: Not on file    Unstable Housing in the Last Year: Not on file       REVIEW OF SYSTEMS    Positive for skin discoloration. No headache neck pain chest pain vomiting or other problems. All systems negative except as marked.      PHYSICAL EXAM    VITAL SIGNS: BP (!) 145/86   Pulse 83   Temp 98.9 °F (37.2 °C) Resp 18   SpO2 97%    Constitutional:  Alert not toxic or ill, overly obese pleasant  HENT:  normocephalic, Atraumatic,    Cervical Spine: Normal range of motion,  No stridor. No tenderness, Supple,  Eyes:  No discharge or  Swelling,Conjunctiva normal, PERRL, EOMI,  Respiratory: No respiratory distress, clear  Cardiovascular:  Normal heart rate, Normal rhythm, No murmurs, No rubs, No gallops. Musculoskeletal:  Intact distal pulses, trace ankle edema, No tenderness, No cyanosis, No clubbing. Good range of motion in all major joints. No tenderness to palpation or major deformities noted. Integument:  Warm, the right lower extremity round distal tib-fib area has some localized erythema without consolidation. Is not increased warmth. Almost petechial in nature. There is no bullae or blebs. Early similar findings to the left lower extremity same position. Lymphatic:  No lymphadenopathy noted. Neurologic:  Alert & oriented x 3, Normal motor function, Normal sensory function, No focal deficits noted. Psychiatric:  Affect normal, Judgment normal, Mood normal.     EKG                           RADIOLOGY    No orders to display       PROCEDURES    none      CONSULTS:  None      CRITICAL CARE:  None      SCREENINGS:  BP (!) 145/86   Pulse 83   Temp 98.9 °F (37.2 °C)   Resp 18   SpO2 97%     Screening For Hypertension and Follow-up (#317)   previously diagnosed with hypertension and not applicable for screen      Screening For Tobacco Use and Cessation Intervention (#226):   reports that he has been smoking cigarettes. He has been smoking about 0.50 packs per day. He has quit using smokeless tobacco.  His smokeless tobacco use included snuff and chew. Tobacco cessation encouraged with brief counseling. Written home care instructions for smoking cessation provided. ED COURSE & MEDICAL DECISION MAKING    Pertinent Labs & Imaging studies reviewed.  (See chart for details)  She has some lower extremity erythema. Is right greater than left although pretty similar pattern. Could be an atypical erythema nodosum. My suspicion for acute infectious process such as cellulitis is on the lower side. He is on Keflex. I am checking some routine labs including platelet count to rule out other hematologic issues. REASSESSMENT  7:43 PM     Labs are reassuring at this time.)  To continue his Keflex. Add some Ultram for some pain. Add some Naprosyn for inflammation. Could still represent just to inflammatory response. White count is elevated although looks chronically elevated. Will need further outpatient assessment evaluation. FINAL IMPRESSION    1. Erythema      Possible vasculitis versus erythema nodosum    PATIENT REFERRED TO:  Hung Quintero DO  [facility] [address] [address] [phone]    Call   For evaluation      DISCHARGE MEDICATIONS:  New Prescriptions    NAPROXEN (NAPROSYN) 500 MG TABLET    Take 1 tablet by mouth 2 times daily for 5 days    TRAMADOL (ULTRAM) 50 MG TABLET    Take 1 tablet by mouth every 6 hours as needed for Pain for up to 3 days. Intended supply: 3 days.  Take lowest dose possible to manage pain          Landy Hicks MD  05/28/22 [id number]

## 2022-06-08 ENCOUNTER — OFFICE VISIT (OUTPATIENT)
Dept: FAMILY MEDICINE CLINIC | Age: 37
End: 2022-06-08
Payer: COMMERCIAL

## 2022-06-08 ENCOUNTER — HOSPITAL ENCOUNTER (OUTPATIENT)
Age: 37
Discharge: HOME | End: 2022-06-08
Payer: COMMERCIAL

## 2022-06-08 VITALS
SYSTOLIC BLOOD PRESSURE: 110 MMHG | DIASTOLIC BLOOD PRESSURE: 72 MMHG | BODY MASS INDEX: 44.1 KG/M2 | WEIGHT: 315 LBS | RESPIRATION RATE: 20 BRPM | OXYGEN SATURATION: 97 % | HEART RATE: 74 BPM | HEIGHT: 71 IN

## 2022-06-08 DIAGNOSIS — I83.93 ASYMPTOMATIC VARICOSE VEINS OF BOTH LOWER EXTREMITIES: ICD-10-CM

## 2022-06-08 DIAGNOSIS — L73.9 FOLLICULITIS: ICD-10-CM

## 2022-06-08 DIAGNOSIS — Z13.220 SCREENING FOR HYPERLIPIDEMIA: ICD-10-CM

## 2022-06-08 DIAGNOSIS — E88.81 INSULIN RESISTANCE: Primary | ICD-10-CM

## 2022-06-08 DIAGNOSIS — L81.8 HEMOSIDERIN PIGMENTATION OF SKIN: ICD-10-CM

## 2022-06-08 DIAGNOSIS — I87.2 VENOUS STASIS DERMATITIS OF BOTH LOWER EXTREMITIES: ICD-10-CM

## 2022-06-08 PROBLEM — E88.819 INSULIN RESISTANCE: Status: ACTIVE | Noted: 2022-06-08

## 2022-06-08 PROBLEM — H70.12 CHRONIC INFLAMMATION OF LEFT MASTOID: Status: RESOLVED | Noted: 2020-01-15 | Resolved: 2022-06-08

## 2022-06-08 PROBLEM — H74.42 GRANULATION POLYP OF MIDDLE EAR, LEFT: Status: RESOLVED | Noted: 2020-01-15 | Resolved: 2022-06-08

## 2022-06-08 LAB
CHOLESTEROL/HDL RATIO: 5
CHOLESTEROL: 225 MG/DL
HDLC SERPL-MCNC: 45 MG/DL
LDL CHOLESTEROL: 149 MG/DL (ref 0–130)
PATIENT FASTING?: YES
TRIGL SERPL-MCNC: 153 MG/DL

## 2022-06-08 PROCEDURE — 36415 COLL VENOUS BLD VENIPUNCTURE: CPT

## 2022-06-08 PROCEDURE — 4004F PT TOBACCO SCREEN RCVD TLK: CPT | Performed by: STUDENT IN AN ORGANIZED HEALTH CARE EDUCATION/TRAINING PROGRAM

## 2022-06-08 PROCEDURE — 99214 OFFICE O/P EST MOD 30 MIN: CPT | Performed by: STUDENT IN AN ORGANIZED HEALTH CARE EDUCATION/TRAINING PROGRAM

## 2022-06-08 PROCEDURE — G8427 DOCREV CUR MEDS BY ELIG CLIN: HCPCS | Performed by: STUDENT IN AN ORGANIZED HEALTH CARE EDUCATION/TRAINING PROGRAM

## 2022-06-08 PROCEDURE — 80061 LIPID PANEL: CPT

## 2022-06-08 PROCEDURE — G8417 CALC BMI ABV UP PARAM F/U: HCPCS | Performed by: STUDENT IN AN ORGANIZED HEALTH CARE EDUCATION/TRAINING PROGRAM

## 2022-06-08 RX ORDER — TRIAMCINOLONE ACETONIDE 1 MG/G
CREAM TOPICAL
Qty: 45 G | Refills: 1 | Status: SHIPPED | OUTPATIENT
Start: 2022-06-08 | End: 2022-07-06 | Stop reason: SDUPTHER

## 2022-06-08 SDOH — ECONOMIC STABILITY: FOOD INSECURITY: WITHIN THE PAST 12 MONTHS, YOU WORRIED THAT YOUR FOOD WOULD RUN OUT BEFORE YOU GOT MONEY TO BUY MORE.: SOMETIMES TRUE

## 2022-06-08 SDOH — ECONOMIC STABILITY: FOOD INSECURITY: WITHIN THE PAST 12 MONTHS, THE FOOD YOU BOUGHT JUST DIDN'T LAST AND YOU DIDN'T HAVE MONEY TO GET MORE.: SOMETIMES TRUE

## 2022-06-08 ASSESSMENT — PATIENT HEALTH QUESTIONNAIRE - PHQ9
8. MOVING OR SPEAKING SO SLOWLY THAT OTHER PEOPLE COULD HAVE NOTICED. OR THE OPPOSITE, BEING SO FIGETY OR RESTLESS THAT YOU HAVE BEEN MOVING AROUND A LOT MORE THAN USUAL: 0
7. TROUBLE CONCENTRATING ON THINGS, SUCH AS READING THE NEWSPAPER OR WATCHING TELEVISION: 0
3. TROUBLE FALLING OR STAYING ASLEEP: 0
SUM OF ALL RESPONSES TO PHQ9 QUESTIONS 1 & 2: 3
SUM OF ALL RESPONSES TO PHQ QUESTIONS 1-9: 3
5. POOR APPETITE OR OVEREATING: 0
SUM OF ALL RESPONSES TO PHQ QUESTIONS 1-9: 3
6. FEELING BAD ABOUT YOURSELF - OR THAT YOU ARE A FAILURE OR HAVE LET YOURSELF OR YOUR FAMILY DOWN: 0
SUM OF ALL RESPONSES TO PHQ QUESTIONS 1-9: 3
10. IF YOU CHECKED OFF ANY PROBLEMS, HOW DIFFICULT HAVE THESE PROBLEMS MADE IT FOR YOU TO DO YOUR WORK, TAKE CARE OF THINGS AT HOME, OR GET ALONG WITH OTHER PEOPLE: 0
4. FEELING TIRED OR HAVING LITTLE ENERGY: 0
2. FEELING DOWN, DEPRESSED OR HOPELESS: 1
1. LITTLE INTEREST OR PLEASURE IN DOING THINGS: 2
SUM OF ALL RESPONSES TO PHQ QUESTIONS 1-9: 3
9. THOUGHTS THAT YOU WOULD BE BETTER OFF DEAD, OR OF HURTING YOURSELF: 0

## 2022-06-08 ASSESSMENT — ENCOUNTER SYMPTOMS
COUGH: 0
VOMITING: 0
WHEEZING: 0
SINUS PAIN: 0
ABDOMINAL PAIN: 0
NAUSEA: 0
COLOR CHANGE: 1
BACK PAIN: 0
SORE THROAT: 0
DIARRHEA: 0

## 2022-06-08 ASSESSMENT — SOCIAL DETERMINANTS OF HEALTH (SDOH): HOW HARD IS IT FOR YOU TO PAY FOR THE VERY BASICS LIKE FOOD, HOUSING, MEDICAL CARE, AND HEATING?: SOMEWHAT HARD

## 2022-06-08 NOTE — PROGRESS NOTES
HPI Notes    Name: Alda Valladares  : 1985         Chief Complaint:     Chief Complaint   Patient presents with    New Patient     Use to see [facility] -[facility]     ED Follow-up     Went to ED on 2022    Rash     red burning rash on both legs onset a couple of weeks        History of Present Illness:      HPI     This is a very nice 39year old man presenting to establish care with a new PCP. His former PCP was Emile Leigh CNP. Will obtain records. Regarding his listed past medical history, bipolar 1 disorder is listed, but upon further questioning, he does not appear to have had any lifetime manic or hypomanic episodes. He reports his mood is \"good (euthymic), and he is not having any problems with sleep, appetite. 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 Screening for DM2: his father has Dm2 and he is wanting checked today. His A1c is 6.2% today. He is trying to \"eat less sweets and put less sugar in my coffee\". He does have a physically active job with lots of walking each day (estimates 1.5 mi/day). Erythematous rash: he reports that it is now getting worse. He does mention that the naproxen is helpful in reducing LE discomfort and cramping. He has been sparingly using Tramadol for severe discomfort. He has finished Keflex. He also has recurrent folliculitis mainly in the crura and the axillae; similar to his father's history. There are no current abscesses.      Past Medical History:     Past Medical History:   Diagnosis Date    Bipolar 1 disorder ([address]     Hypertension     Kidney stones     Obesity     Pre-diabetes       Reviewed all health maintenance requirements and ordered appropriate tests  Health Maintenance Due   Topic Date Due    Varicella vaccine (1 of 2 - 2-dose childhood series) Never done    COVID-19 Vaccine (1) Never done    Depression Screen Never done    HIV screen  Never done    Hepatitis C screen  Never done    Diabetes screen  Never done       Past Surgical History:     Past Surgical History:   Procedure Laterality Date    DENTAL SURGERY      TESTICLE REMOVAL      TONSILLECTOMY      TYMPANOMASTOIDECTOMY Left 5/4/2021    TYMPANOMASTOIDECTOMY performed by Samara Rucker MD at [address] [facility] TYMPANOSTOMY TUBE PLACEMENT      TYMPANOSTOMY TUBE PLACEMENT      UMBILICAL HERNIA REPAIR          Medications:       Prior to Admission medications    Medication Sig Start Date End Date Taking? Authorizing Provider   triamcinolone (KENALOG) 0.1 % cream Apply topically 2 times daily. 6/8/22  Yes Nhi Toro DO   mupirocin (BACTROBAN) 2 % ointment Apply topically 3 times daily. 6/8/22 6/15/22 Yes Nhi oTro DO        Allergies:       Patient has no known allergies. Social History:     Tobacco:    reports that he has been smoking cigarettes. He has a 7.50 pack-year smoking history. He has quit using smokeless tobacco.  His smokeless tobacco use included snuff and chew. Alcohol:      reports no history of alcohol use. Drug Use:  reports no history of drug use. Family History:     Family History   Problem Relation Age of Onset    High Blood Pressure Mother     Cancer Father     Diabetes Father     Cancer Paternal Grandfather     No Known Problems Sister     No Known Problems Brother     No Known Problems Sister        Review of Systems:       Review of Systems   Constitutional: Negative for fever. HENT: Negative for sinus pain, sneezing and sore throat. Respiratory: Negative for cough and wheezing. Cardiovascular: Negative for chest pain. Gastrointestinal: Negative for abdominal pain, diarrhea, nausea and vomiting. Musculoskeletal: Negative for back pain. Skin: Positive for color change and rash. Hematological: Negative for adenopathy. Psychiatric/Behavioral: Negative for sleep disturbance.            Physical Exam: Vitals:  /72 (Site: Left Upper Arm, Position: Sitting, Cuff Size: Large Adult)   Pulse 74   Resp 20   Ht 5' 10.5\" (1.791 m)   Wt (!) 316 lb (143.3 kg)   SpO2 97%   BMI 44.70 kg/m²       Physical Exam  Vitals and nursing note reviewed. Constitutional:       General: He is not in acute distress. Appearance: Normal appearance. He is obese. HENT:      Right Ear: Tympanic membrane, ear canal and external ear normal. There is no impacted cerumen. Left Ear: Tympanic membrane, ear canal and external ear normal. There is no impacted cerumen. Nose: Nose normal. No congestion. Mouth/Throat:      Mouth: Mucous membranes are moist.      Pharynx: Oropharynx is clear. No oropharyngeal exudate. Eyes:      General: No scleral icterus. Conjunctiva/sclera: Conjunctivae normal.      Pupils: Pupils are equal, round, and reactive to light. Cardiovascular:      Rate and Rhythm: Normal rate and regular rhythm. Pulses: Normal pulses. Heart sounds: Normal heart sounds. No murmur heard. Pulmonary:      Effort: Pulmonary effort is normal. No respiratory distress. Breath sounds: Normal breath sounds. No wheezing. Abdominal:      General: Abdomen is flat. Bowel sounds are normal.      Palpations: Abdomen is soft. Tenderness: There is no abdominal tenderness. Musculoskeletal:      Cervical back: Normal range of motion and neck supple. No rigidity. No muscular tenderness. Right lower leg: No edema. Left lower leg: No edema. Comments: Proximal right lower extremity varicosities   Lymphadenopathy:      Cervical: No cervical adenopathy. Skin:     General: Skin is warm and dry. Capillary Refill: Capillary refill takes less than 2 seconds. Findings: Rash present. Comments: Erythematous petechial rash over the lower extremities, particularly distal lower extremities.   There is linear dermatitis on the upper calves, consistent with sock line of his compression hose. Numerous scarred follicles in the crural folds bilaterally, as well as posterior right upper extremity   Neurological:      General: No focal deficit present. Mental Status: He is alert and oriented to person, place, and time. Mental status is at baseline. Psychiatric:         Mood and Affect: Mood normal.         Behavior: Behavior normal.         Thought Content: Thought content normal.                     Data:     Lab Results   Component Value Date     05/28/2022    K 3.9 05/28/2022     05/28/2022    CO2 24 05/28/2022    BUN 16 05/28/2022    CREATININE 0.92 05/28/2022    GLUCOSE 115 05/28/2022    PROT 6.8 05/28/2022    LABALBU 4.2 05/28/2022    BILITOT 0.16 05/28/2022    ALKPHOS 74 05/28/2022    AST 22 05/28/2022    ALT 39 05/28/2022     Lab Results   Component Value Date    WBC 12.9 05/28/2022    RBC 4.85 05/28/2022    RBC 5.29 05/02/2016    HGB 14.9 05/28/2022    HCT 44.0 05/28/2022    MCV 90.5 05/28/2022    MCH 30.6 05/28/2022    MCHC 33.8 05/28/2022    RDW 13.5 05/28/2022     05/28/2022    MPV NOT REPORTED 03/04/2021     No results found for: TSH  No results found for: CHOL, LDL, HDL, PSA, LABA1C       Assessment & Plan        Diagnosis Orders   1. Insulin resistance  [facility] Diabetes Education [facility]   2. Venous stasis dermatitis of both lower extremities  triamcinolone (KENALOG) 0.1 % cream   3. Hemosiderin pigmentation of skin  triamcinolone (KENALOG) 0.1 % cream   4. Folliculitis  mupirocin (BACTROBAN) 2 % ointment   5. Asymptomatic varicose veins of both lower extremities     6. Screening for hyperlipidemia  Lipid Panel       Hemoglobin A1c is 6.2% today. We had a long discussion about risk factors which would preclude him to developing diabetes including family history, obesity. He seems to work a fairly active job, which is excellent for maintaining physical activity.   He is interested in making beneficial changes to his diet and has already begun reducing his sugar intake. I would recommend that he have a visit with our diabetic educators to undergo more teaching about dietary intervention for treatment of insulin resistance and prevention of evolution of type 2 diabetes. 2.  Based on history, physical examination Claire Bennett is an erythematous painful rash that he has been suffering from is actually venous stasis dermatitis of lower extremities with hemosiderin pigmentation. I would recommend he continue using compression stockings and utilize triamcinolone 0.1% cream applied twice daily for 1 to 2 weeks. He does not have any appreciable lower extremity edema and I would not recommend diuretics at this time. Follow-up in 4 weeks for this problem. 4.  This appears to be pigmentation scarring from recurrent folliculitis. Hidradenitis suppurativa is also in my differential due to its location and his obese body habitus. I feel the former is more likely as he does not have any signs of sinus tracts developing in the area is much less erythematous and inflamed, as we would expect a chest to be. I would recommend that he use mupirocin 2% ointment applied 3 times daily for 10 days to active follicles which are infected. 6.  He is obese, which is certainly a risk factor for hyperlipidemia. I would recommend we obtain a lipid panel today and risk stratify based on lipid profile results. Follow-up in 4 weeks        Completed Refills   Requested Prescriptions     Signed Prescriptions Disp Refills    triamcinolone (KENALOG) 0.1 % cream 45 g 1     Sig: Apply topically 2 times daily.  mupirocin (BACTROBAN) 2 % ointment 15 g 1     Sig: Apply topically 3 times daily. Return in about 4 weeks (around 7/6/2022) for venous stasis. Orders Placed This Encounter   Medications    triamcinolone (KENALOG) 0.1 % cream     Sig: Apply topically 2 times daily.      Dispense:  45 g     Refill:  1    mupirocin (BACTROBAN) 2 % ointment     Sig: Apply topically 3 times daily. Dispense:  15 g     Refill:  1     Orders Placed This Encounter   Procedures    Lipid Panel     Standing Status:   Future     Number of Occurrences:   1     Standing Expiration Date:   6/8/2023     Order Specific Question:   Is Patient Fasting?/# of Hours     Answer:   yes/10   [address] Diabetes Education [facility]     Referral Priority:   Routine     Referral Type:   Eval and Treat     Referral Reason:   Specialty Services Required     Number of Visits Requested:   1         Patient Instructions     SURVEY:    You may be receiving a survey from [employer] regarding your visit today. Please complete the survey to enable us to provide the highest quality of care to you and your family. If you cannot score us a very good on any question, please call the office to discuss how we could of made your experience a very good one. Thank you. Clinical Care Team:     BUBBA Alexandra      ClericalTeam:     Eula Morillo      Electronically signed by Alphonso Queen DO on 6/8/2022 at 12:03 PM           Completed Refills   Requested Prescriptions     Signed Prescriptions Disp Refills    triamcinolone (KENALOG) 0.1 % cream 45 g 1     Sig: Apply topically 2 times daily.  mupirocin (BACTROBAN) 2 % ointment 15 g 1     Sig: Apply topically 3 times daily.

## 2022-06-08 NOTE — PATIENT INSTRUCTIONS
SURVEY:    You may be receiving a survey from [employer] regarding your visit today. Please complete the survey to enable us to provide the highest quality of care to you and your family. If you cannot score us a very good on any question, please call the office to discuss how we could of made your experience a very good one. Thank you.       Clinical Care Team:     Dr. Martha Mo, FAIZAN      [facility]:     [id number] [facility]

## 2022-07-06 ENCOUNTER — OFFICE VISIT (OUTPATIENT)
Dept: FAMILY MEDICINE CLINIC | Age: 37
End: 2022-07-06
Payer: COMMERCIAL

## 2022-07-06 ENCOUNTER — HOSPITAL ENCOUNTER (OUTPATIENT)
Dept: GENERAL RADIOLOGY | Age: 37
Discharge: HOME | End: 2022-07-08
Payer: COMMERCIAL

## 2022-07-06 ENCOUNTER — HOSPITAL ENCOUNTER (OUTPATIENT)
Age: 37
Discharge: HOME | End: 2022-07-08
Payer: COMMERCIAL

## 2022-07-06 VITALS
RESPIRATION RATE: 20 BRPM | WEIGHT: 308.8 LBS | HEART RATE: 80 BPM | HEIGHT: 70 IN | SYSTOLIC BLOOD PRESSURE: 130 MMHG | DIASTOLIC BLOOD PRESSURE: 78 MMHG | BODY MASS INDEX: 44.21 KG/M2

## 2022-07-06 DIAGNOSIS — M25.572 CHRONIC ANKLE PAIN, BILATERAL: ICD-10-CM

## 2022-07-06 DIAGNOSIS — L81.8 HEMOSIDERIN PIGMENTATION OF SKIN: ICD-10-CM

## 2022-07-06 DIAGNOSIS — G89.29 CHRONIC ANKLE PAIN, BILATERAL: ICD-10-CM

## 2022-07-06 DIAGNOSIS — M25.571 CHRONIC ANKLE PAIN, BILATERAL: ICD-10-CM

## 2022-07-06 DIAGNOSIS — N52.9 ERECTILE DYSFUNCTION, UNSPECIFIED ERECTILE DYSFUNCTION TYPE: ICD-10-CM

## 2022-07-06 DIAGNOSIS — I87.2 VENOUS STASIS DERMATITIS OF BOTH LOWER EXTREMITIES: Primary | ICD-10-CM

## 2022-07-06 PROCEDURE — 73630 X-RAY EXAM OF FOOT: CPT

## 2022-07-06 PROCEDURE — 73610 X-RAY EXAM OF ANKLE: CPT

## 2022-07-06 PROCEDURE — 4004F PT TOBACCO SCREEN RCVD TLK: CPT | Performed by: STUDENT IN AN ORGANIZED HEALTH CARE EDUCATION/TRAINING PROGRAM

## 2022-07-06 PROCEDURE — G8417 CALC BMI ABV UP PARAM F/U: HCPCS | Performed by: STUDENT IN AN ORGANIZED HEALTH CARE EDUCATION/TRAINING PROGRAM

## 2022-07-06 PROCEDURE — 99214 OFFICE O/P EST MOD 30 MIN: CPT | Performed by: STUDENT IN AN ORGANIZED HEALTH CARE EDUCATION/TRAINING PROGRAM

## 2022-07-06 PROCEDURE — G8427 DOCREV CUR MEDS BY ELIG CLIN: HCPCS | Performed by: STUDENT IN AN ORGANIZED HEALTH CARE EDUCATION/TRAINING PROGRAM

## 2022-07-06 RX ORDER — TRIAMCINOLONE ACETONIDE 1 MG/G
CREAM TOPICAL
Qty: 45 G | Refills: 3 | Status: SHIPPED | OUTPATIENT
Start: 2022-07-06

## 2022-07-06 ASSESSMENT — ENCOUNTER SYMPTOMS
NAUSEA: 0
SINUS PAIN: 0
SORE THROAT: 0
VOMITING: 0
WHEEZING: 0
BACK PAIN: 0
DIARRHEA: 0
COUGH: 0
ABDOMINAL PAIN: 0

## 2022-07-06 NOTE — PROGRESS NOTES
HPI Notes    Name: Misael Rock  : 1985         Chief Complaint:     Chief Complaint   Patient presents with    Rash     venous stasis. states it comes and goes it is good today last time it appeared was a few days ago states the cream helped alot        History of Present Illness:        HPI    This is a 27-year-old man presenting for interval evaluation for venous stasis. He reports that the stasis dermatitis comes and goes and has overall been greatly improved by treatment with triamcinolone 0.1%. He has not used any compression stockings past one time, due to them \"hurting my feet and making them too hot\". He has been eating less carbohydrates and drinking less soda, and \"eating healthier\". He is still endorsing bilateral LE cramping and soreness. This has been improved with OTC naproxen but he is desiring not to use that long term. They are no longer swollen. The discomfort is from the ankles distally bilaterally. He does have a history of left ankle fracture and history of high impact sports. He also has a history of repeated bilateral ligament damage and was told he had \"rubber ankles\". It has been nearly 20 years since last XR of the ankles. Lastly he would like to discuss problems with sexual performance. He is reporting it is difficult for him to become aroused sufficiently to engage with sexual intercourse and he also has difficulty maintaining an erection. This began around six months ago. He also is reporting markedly low libido.      Past Medical History:     Past Medical History:   Diagnosis Date    Bipolar 1 disorder ([facility] [address]     Hypertension     Kidney stones     Obesity     Pre-diabetes       Reviewed all health maintenance requirements and ordered appropriate tests  Health Maintenance Due   Topic Date Due    Varicella vaccine (1 of 2 - 2-dose childhood series) Never done    COVID-19 Vaccine (1) Never done    HIV screen  Never done    Hepatitis C screen  Never done  Diabetes screen  Never done       Past Surgical History:     Past Surgical History:   Procedure Laterality Date    DENTAL SURGERY      TESTICLE REMOVAL      TONSILLECTOMY      TYMPANOMASTOIDECTOMY Left 5/4/2021    TYMPANOMASTOIDECTOMY performed by Agustin Winston MD at [address] [place] [address] TYMPANOSTOMY TUBE PLACEMENT      TYMPANOSTOMY TUBE PLACEMENT      UMBILICAL HERNIA REPAIR          Medications:       Prior to Admission medications    Medication Sig Start Date End Date Taking? Authorizing Provider   triamcinolone (KENALOG) 0.1 % cream Apply topically 2 times daily. 7/6/22  Yes Tiarra Toro DO        Allergies:       Patient has no known allergies. Social History:     Tobacco:    reports that he has been smoking cigarettes. He has a 7.50 pack-year smoking history. He has quit using smokeless tobacco.  His smokeless tobacco use included snuff and chew. Alcohol:      reports no history of alcohol use. Drug Use:  reports no history of drug use. Family History:     Family History   Problem Relation Age of Onset    High Blood Pressure Mother     Cancer Father     Diabetes Father     Cancer Paternal Grandfather     No Known Problems Sister     No Known Problems Brother     No Known Problems Sister        Review of Systems:     Review of Systems   Constitutional: Negative for fever. HENT: Negative for sinus pain, sneezing and sore throat. Respiratory: Negative for cough and wheezing. Cardiovascular: Negative for chest pain. Gastrointestinal: Negative for abdominal pain, diarrhea, nausea and vomiting. Genitourinary:        Erectile dysfunction   Musculoskeletal: Positive for arthralgias. Negative for back pain. Skin: Positive for rash. Hematological: Negative for adenopathy. Psychiatric/Behavioral: Negative for sleep disturbance.        Physical Exam:     Vitals:  /78 (Site: Left Upper Arm, Position: Sitting, Cuff Size: Large Adult)   Pulse 80   Resp 20   Ht 5' 10\" (1.778 m) Wt (!) 308 lb 12.8 oz (140.1 kg)   BMI 44.31 kg/m²       Physical Exam  Vitals and nursing note reviewed. Constitutional:       General: He is not in acute distress. Appearance: Normal appearance. He is obese. Musculoskeletal:         General: No swelling or tenderness. Normal range of motion. Skin:     General: Skin is warm and dry. Capillary Refill: Capillary refill takes less than 2 seconds. Findings: No erythema or rash. Neurological:      General: No focal deficit present. Mental Status: He is alert and oriented to person, place, and time. Mental status is at baseline. Psychiatric:         Mood and Affect: Mood normal.         Behavior: Behavior normal.         Thought Content: Thought content normal.             LEs greatly improved from prior OV        Data:     Lab Results   Component Value Date/Time     05/28/2022 07:15 PM    K 3.9 05/28/2022 07:15 PM     05/28/2022 07:15 PM    CO2 24 05/28/2022 07:15 PM    BUN 16 05/28/2022 07:15 PM    CREATININE 0.92 05/28/2022 07:15 PM    GLUCOSE 115 05/28/2022 07:15 PM    PROT 6.8 05/28/2022 07:15 PM    LABALBU 4.2 05/28/2022 07:15 PM    BILITOT 0.16 05/28/2022 07:15 PM    ALKPHOS 74 05/28/2022 07:15 PM    AST 22 05/28/2022 07:15 PM    ALT 39 05/28/2022 07:15 PM     Lab Results   Component Value Date/Time    WBC 12.9 05/28/2022 07:15 PM    RBC 4.85 05/28/2022 07:15 PM    RBC 5.29 05/02/2016 04:35 PM    HGB 14.9 05/28/2022 07:15 PM    HCT 44.0 05/28/2022 07:15 PM    MCV 90.5 05/28/2022 07:15 PM    MCH 30.6 05/28/2022 07:15 PM    MCHC 33.8 05/28/2022 07:15 PM    RDW 13.5 05/28/2022 07:15 PM     05/28/2022 07:15 PM    MPV NOT REPORTED 03/04/2021 11:56 PM     No results found for: TSH  Lab Results   Component Value Date/Time    CHOL 225 06/08/2022 11:29 AM    HDL 45 06/08/2022 11:29 AM          Assessment & Plan        Diagnosis Orders   1.  Venous stasis dermatitis of both lower extremities  triamcinolone (KENALOG) 0.1 % cream   2. Hemosiderin pigmentation of skin  triamcinolone (KENALOG) 0.1 % cream   3. Chronic ankle pain, bilateral  XR FOOT LEFT (MIN 3 VIEWS)    XR FOOT RIGHT (MIN 3 VIEWS)    XR ANKLE LEFT (MIN 3 VIEWS)    XR ANKLE RIGHT (MIN 3 VIEWS)   4. Erectile dysfunction, unspecified erectile dysfunction type  Joan Chester MD, Urology, Neema Camacho       1. This problem is improving, continue to recommend compression therapy, as needed use of triamcinolone. 3.  Based on history, I believe that he is likely developing posttraumatic arthritis. I would recommend bilateral foot and ankle plain, x-ray. May consider treatment with Mobic or similar medication initiation to continue to move toward a healthier weight for his height. 4.  Likely multifactorial, or vascular in nature. I would recommend that he increase his physical activity and continue to move towards a healthier weight for his height. He may also trial over-the-counter supplemental Panex ginseng. In the meantime, we will also place referral to urology for additional assistance in working up this problem. Follow up in 6 months for well visit. Completed Refills   Requested Prescriptions     Signed Prescriptions Disp Refills    triamcinolone (KENALOG) 0.1 % cream 45 g 3     Sig: Apply topically 2 times daily. Return if symptoms worsen or fail to improve. Orders Placed This Encounter   Medications    triamcinolone (KENALOG) 0.1 % cream     Sig: Apply topically 2 times daily.      Dispense:  45 g     Refill:  3     Orders Placed This Encounter   Procedures    XR FOOT LEFT (MIN 3 VIEWS)     Standing Status:   Future     Standing Expiration Date:   7/6/2023     Order Specific Question:   Reason for exam:     Answer:   concern for posttraumatic OA    XR FOOT RIGHT (MIN 3 VIEWS)     Standing Status:   Future     Standing Expiration Date:   7/6/2023     Order Specific Question:   Reason for exam:     Answer:   concern for posttraumatic OA  XR ANKLE LEFT (MIN 3 VIEWS)     Standing Status:   Future     Standing Expiration Date:   7/6/2023     Order Specific Question:   Reason for exam:     Answer:   concern for posttraumatic OA    XR ANKLE RIGHT (MIN 3 VIEWS)     Standing Status:   Future     Standing Expiration Date:   7/6/2023     Order Specific Question:   Reason for exam:     Answer:   concern for posttraumatic OA    Joan Cordero MD, Urology, Danny Hale     Referral Priority:   Routine     Referral Type:   Eval and Treat     Referral Reason:   Specialty Services Required     Referred to Provider:   Sumaya Cortes MD     Requested Specialty:   Urology     Number of Visits Requested:   1         Patient Instructions   Whit Toussaint,    Thank you for coming to see me today! I believe that our main problem is the venous disease. Here's what I would recommend we do: Wear compression socks each day    We also discussed getting x rays of the ankles. We'll call for results. Thank you for discussing your erectile dysfunction with me. I'd recommend continued improved diet and exercise. You can also consider adding Panax ginseng daily until you see Dr. Jj Dale (Urology). Please review the documents I'm attaching related to what we discussed today. Please give me a call or message me on [employer] if you have any problems or questions, and I will see you at your next visit. Dr. Ashleigh Bingham:    Michael Sorenson may be receiving a survey from [employer] regarding your visit today. Please complete the survey to enable us to provide the highest quality of care to you and your family. If you cannot score us a very good on any question, please call the office to discuss how we could of made your experience a very good one. Thank you.       Clinical Care Team:     Dr. Marian Handy, FAIZAN      ClericalTeam:     [id number] [facility]      Electronically signed by Shanda Courtney DO on 7/6/2022 at 3:10 PM Completed Refills   Requested Prescriptions     Signed Prescriptions Disp Refills    triamcinolone (KENALOG) 0.1 % cream 45 g 3     Sig: Apply topically 2 times daily.

## 2022-07-06 NOTE — PATIENT INSTRUCTIONS
Fidelia Roles you for coming to see me today! I believe that our main problem is the venous disease. Here's what I would recommend we do: Wear compression socks each day    We also discussed getting x rays of the ankles. We'll call for results. Thank you for discussing your erectile dysfunction with me. I'd recommend continued improved diet and exercise. You can also consider adding Panax ginseng daily until you see Dr. Edith Lim (Urology). Please review the documents I'm attaching related to what we discussed today. Please give me a call or message me on [employer] if you have any problems or questions, and I will see you at your next visit. Dr. Chin Younger:    Tanya Boas may be receiving a survey from [employer] regarding your visit today. Please complete the survey to enable us to provide the highest quality of care to you and your family. If you cannot score us a very good on any question, please call the office to discuss how we could of made your experience a very good one. Thank you.       Clinical Care Team:     Dr. Skip Sever, RMA      ClericalTeam:     Yojana Olivier

## 2022-08-16 ENCOUNTER — HOSPITAL ENCOUNTER (EMERGENCY)
Age: 37
Discharge: HOME | End: 2022-08-16
Attending: EMERGENCY MEDICINE
Payer: COMMERCIAL

## 2022-08-16 ENCOUNTER — APPOINTMENT (OUTPATIENT)
Dept: GENERAL RADIOLOGY | Age: 37
End: 2022-08-16
Payer: COMMERCIAL

## 2022-08-16 VITALS
BODY MASS INDEX: 45.1 KG/M2 | HEART RATE: 83 BPM | TEMPERATURE: 97.9 F | RESPIRATION RATE: 18 BRPM | SYSTOLIC BLOOD PRESSURE: 145 MMHG | DIASTOLIC BLOOD PRESSURE: 96 MMHG | WEIGHT: 315 LBS | OXYGEN SATURATION: 100 % | HEIGHT: 70 IN

## 2022-08-16 DIAGNOSIS — M54.50 ACUTE LEFT-SIDED LOW BACK PAIN WITHOUT SCIATICA: Primary | ICD-10-CM

## 2022-08-16 PROCEDURE — 99284 EMERGENCY DEPT VISIT MOD MDM: CPT

## 2022-08-16 PROCEDURE — 6360000002 HC RX W HCPCS: Performed by: EMERGENCY MEDICINE

## 2022-08-16 PROCEDURE — 72110 X-RAY EXAM L-2 SPINE 4/>VWS: CPT

## 2022-08-16 PROCEDURE — 96372 THER/PROPH/DIAG INJ SC/IM: CPT

## 2022-08-16 RX ORDER — ORPHENADRINE CITRATE 30 MG/ML
60 INJECTION INTRAMUSCULAR; INTRAVENOUS ONCE
Status: COMPLETED | OUTPATIENT
Start: 2022-08-16 | End: 2022-08-16

## 2022-08-16 RX ORDER — TIZANIDINE 4 MG/1
4 TABLET ORAL 3 TIMES DAILY PRN
Qty: 30 TABLET | Refills: 0 | Status: SHIPPED | OUTPATIENT
Start: 2022-08-16

## 2022-08-16 RX ORDER — KETOROLAC TROMETHAMINE 30 MG/ML
30 INJECTION, SOLUTION INTRAMUSCULAR; INTRAVENOUS ONCE
Status: COMPLETED | OUTPATIENT
Start: 2022-08-16 | End: 2022-08-16

## 2022-08-16 RX ORDER — NAPROXEN 500 MG/1
500 TABLET ORAL 2 TIMES DAILY
Qty: 20 TABLET | Refills: 0 | Status: SHIPPED | OUTPATIENT
Start: 2022-08-16

## 2022-08-16 RX ADMIN — KETOROLAC TROMETHAMINE 30 MG: 30 INJECTION, SOLUTION INTRAMUSCULAR; INTRAVENOUS at 12:15

## 2022-08-16 RX ADMIN — ORPHENADRINE CITRATE 60 MG: 30 INJECTION INTRAMUSCULAR; INTRAVENOUS at 12:16

## 2022-08-16 ASSESSMENT — PAIN SCALES - GENERAL
PAINLEVEL_OUTOF10: 10
PAINLEVEL_OUTOF10: 10

## 2022-08-16 ASSESSMENT — PAIN DESCRIPTION - ORIENTATION
ORIENTATION: LEFT
ORIENTATION: LEFT

## 2022-08-16 ASSESSMENT — PAIN DESCRIPTION - DESCRIPTORS
DESCRIPTORS: ACHING;SHARP;THROBBING
DESCRIPTORS: SHARP;THROBBING;DISCOMFORT

## 2022-08-16 ASSESSMENT — PAIN DESCRIPTION - LOCATION
LOCATION: BACK
LOCATION: BACK

## 2022-08-16 ASSESSMENT — PAIN DESCRIPTION - PAIN TYPE: TYPE: ACUTE PAIN

## 2022-08-16 ASSESSMENT — PAIN - FUNCTIONAL ASSESSMENT: PAIN_FUNCTIONAL_ASSESSMENT: 0-10

## 2022-08-16 NOTE — ED PROVIDER NOTES
eMERGENCY dEPARTMENT eNCOUnter        [address] [facility]    Chief Complaint   Patient presents with    Back Pain     C/o left lower back pain that radiates down left leg, started this morning. HPI    Petra Malhotra is a 39 y.o. male who presents to ED from home. By car   With complaint of L low back pain. Onset this am.  Patient states that he woke up this morning with low back pain. The pain radiates to his left leg. Denies weakness of the extremity. Patient denies urine or bowel dysfunction. Patient denies saddle anesthesia. Intensity of symptoms moderate to severe pain  Patient denies recent injury. Patient states that couple years ago he pulled his back at work. REVIEW OF SYSTEMS    All systems reviewed and positives are in the HPI. PAST MEDICAL HISTORY    Past Medical History:   Diagnosis Date    Bipolar 1 disorder ([address]     Hypertension     Kidney stones     Obesity     Pre-diabetes        SURGICAL HISTORY    Past Surgical History:   Procedure Laterality Date    DENTAL SURGERY      TESTICLE REMOVAL      TONSILLECTOMY      TYMPANOMASTOIDECTOMY Left 5/4/2021    TYMPANOMASTOIDECTOMY performed by Clement Ludwig MD at [facility] REPAIR         CURRENT MEDICATIONS      Current Outpatient Rx   Medication Sig Dispense Refill    naproxen (NAPROSYN) 500 MG tablet Take 1 tablet by mouth in the morning and 1 tablet before bedtime. 20 tablet 0    tiZANidine (ZANAFLEX) 4 MG tablet Take 1 tablet by mouth 3 times daily as needed (Low back pain, low back muscle spasm.) 30 tablet 0    triamcinolone (KENALOG) 0.1 % cream Apply topically 2 times daily.  45 g 3       ALLERGIES      No Known Allergies    FAMILY HISTORY    Family History   Problem Relation Age of Onset    High Blood Pressure Mother     Cancer Father     Diabetes Father     Cancer Paternal Grandfather     No Known Problems Sister     No Known Problems Brother No Known Problems Sister        SOCIAL HISTORY    Social History     Socioeconomic History    Marital status:      Spouse name: None    Number of children: None    Years of education: None    Highest education level: None   Tobacco Use    Smoking status: Every Day     Packs/day: 0.50     Years: 15.00     Pack years: 7.50     Types: Cigarettes    Smokeless tobacco: Former     Types: Snuff, Chew   Vaping Use    Vaping Use: Never used   Substance and Sexual Activity    Alcohol use: No    Drug use: Never    Sexual activity: Yes     Partners: Female     Social Determinants of Health     Financial Resource Strain: Medium Risk    Difficulty of Paying Living Expenses: Somewhat hard   Food Insecurity: Food Insecurity Present    Worried About Running Out of Food in the Last Year: Sometimes true    Ran Out of Food in the Last Year: Sometimes true       PHYSICAL EXAM    VITAL SIGNS: BP (!) 145/96   Pulse 83   Temp 97.9 °F (36.6 °C) (Oral)   Resp 18   Ht 5' 10\" (1.778 m)   Wt (!) 320 lb (145.2 kg)   SpO2 100%   BMI 45.92 kg/m²    Constitutional:  Well developed, well nourished, no acute distress, non-toxic appearance HENT:  Atraumatic, external ears normal, nose normal, oropharynx moist. Neck- normal range of motion, no tenderness, supple   Respiratory:  No respiratory distress, normal breath sounds. Cardiovascular:  Normal rate, normal rhythm, no murmurs, no gallops, no rubs   GI:  Soft, nondistended, nontender, no organomegaly, no mass, no rebound, no guarding   :  No costovertebral angle tenderness   Musculoskeletal:  No edema, no tenderness, no deformities. Back-left lumbar pain with radiation to left buttock. No weakness of the extremities. Integument:  Well hydrated   Neurologic: Alert and oriented x3 no focal deficits. Straight leg raise is negative. No weakness or numbness of the lower extremity. Patient has pain radiating to his left buttock.     EKG        RADIOLOGY/PROCEDURES    XR LUMBAR SPINE (MIN 4 VIEWS)   Final Result      Early age expected degenerative changes. Labs  Labs Reviewed - No data to display        Summation      Patient Course: Physical activity as tolerated. Follow-up with pain management. The warning signs were discussed. Return to ED if worse. ED Medications administered this visit:    Medications   ketorolac (TORADOL) injection 30 mg (30 mg IntraMUSCular Given 8/16/22 1215)   orphenadrine (NORFLEX) injection 60 mg (60 mg IntraMUSCular Given 8/16/22 1216)       New Prescriptions from this visit:    New Prescriptions    NAPROXEN (NAPROSYN) 500 MG TABLET    Take 1 tablet by mouth in the morning and 1 tablet before bedtime. TIZANIDINE (ZANAFLEX) 4 MG TABLET    Take 1 tablet by mouth 3 times daily as needed (Low back pain, low back muscle spasm.)       Follow-up:  Tosin Rivera DPM  [address] [place] [address]  [phone]    Schedule an appointment as soon as possible for a visit       True Hodge MD  [address] [place] [phone]    In 1 week  Return to ED if worse. Final Impression:   1.  Acute left-sided low back pain without sciatica               (Please note that portions of this note were completed with a voice recognition program.  Efforts were made to edit the dictations but occasionally words are mis-transcribed.)       Jero Jackson MD  08/16/22 4521

## 2022-08-16 NOTE — LETTER
[facility] ED  [address]  Phone: [phone]               August 16, 2022    Patient: Nikos Marquez   YOB: 1985   Date of Visit: 8/16/2022       To Whom It May Concern:    Nichol Fay was seen and treated in our emergency department on 8/16/2022. He may return to work on 08/17/2022.       Sincerely,       Olivia Jolly RN         Signature:__________________________________

## 2022-11-09 ENCOUNTER — OFFICE VISIT (OUTPATIENT)
Dept: FAMILY MEDICINE CLINIC | Age: 37
End: 2022-11-09
Payer: COMMERCIAL

## 2022-11-09 VITALS
BODY MASS INDEX: 45.01 KG/M2 | HEART RATE: 84 BPM | DIASTOLIC BLOOD PRESSURE: 86 MMHG | HEIGHT: 70 IN | WEIGHT: 314.4 LBS | OXYGEN SATURATION: 98 % | RESPIRATION RATE: 20 BRPM | SYSTOLIC BLOOD PRESSURE: 154 MMHG

## 2022-11-09 DIAGNOSIS — M54.42 ACUTE BILATERAL LOW BACK PAIN WITH BILATERAL SCIATICA: Primary | ICD-10-CM

## 2022-11-09 DIAGNOSIS — M54.41 ACUTE BILATERAL LOW BACK PAIN WITH BILATERAL SCIATICA: Primary | ICD-10-CM

## 2022-11-09 PROCEDURE — 99213 OFFICE O/P EST LOW 20 MIN: CPT | Performed by: STUDENT IN AN ORGANIZED HEALTH CARE EDUCATION/TRAINING PROGRAM

## 2022-11-09 PROCEDURE — G8484 FLU IMMUNIZE NO ADMIN: HCPCS | Performed by: STUDENT IN AN ORGANIZED HEALTH CARE EDUCATION/TRAINING PROGRAM

## 2022-11-09 PROCEDURE — G8427 DOCREV CUR MEDS BY ELIG CLIN: HCPCS | Performed by: STUDENT IN AN ORGANIZED HEALTH CARE EDUCATION/TRAINING PROGRAM

## 2022-11-09 PROCEDURE — G8417 CALC BMI ABV UP PARAM F/U: HCPCS | Performed by: STUDENT IN AN ORGANIZED HEALTH CARE EDUCATION/TRAINING PROGRAM

## 2022-11-09 PROCEDURE — 4004F PT TOBACCO SCREEN RCVD TLK: CPT | Performed by: STUDENT IN AN ORGANIZED HEALTH CARE EDUCATION/TRAINING PROGRAM

## 2022-11-09 RX ORDER — PREDNISONE 20 MG/1
TABLET ORAL
COMMUNITY
Start: 2022-10-31 | End: 2022-11-09 | Stop reason: ALTCHOICE

## 2022-11-09 RX ORDER — HYDROCODONE BITARTRATE AND ACETAMINOPHEN 5; 325 MG/1; MG/1
TABLET ORAL
COMMUNITY
Start: 2022-11-01 | End: 2022-11-09 | Stop reason: ALTCHOICE

## 2022-11-09 RX ORDER — METHOCARBAMOL 750 MG/1
TABLET, FILM COATED ORAL
COMMUNITY
Start: 2022-10-31 | End: 2022-11-09 | Stop reason: ALTCHOICE

## 2022-11-09 ASSESSMENT — PATIENT HEALTH QUESTIONNAIRE - PHQ9
5. POOR APPETITE OR OVEREATING: 0
SUM OF ALL RESPONSES TO PHQ QUESTIONS 1-9: 0
1. LITTLE INTEREST OR PLEASURE IN DOING THINGS: 0
8. MOVING OR SPEAKING SO SLOWLY THAT OTHER PEOPLE COULD HAVE NOTICED. OR THE OPPOSITE, BEING SO FIGETY OR RESTLESS THAT YOU HAVE BEEN MOVING AROUND A LOT MORE THAN USUAL: 0
4. FEELING TIRED OR HAVING LITTLE ENERGY: 0
9. THOUGHTS THAT YOU WOULD BE BETTER OFF DEAD, OR OF HURTING YOURSELF: 0
10. IF YOU CHECKED OFF ANY PROBLEMS, HOW DIFFICULT HAVE THESE PROBLEMS MADE IT FOR YOU TO DO YOUR WORK, TAKE CARE OF THINGS AT HOME, OR GET ALONG WITH OTHER PEOPLE: 0
2. FEELING DOWN, DEPRESSED OR HOPELESS: 0
SUM OF ALL RESPONSES TO PHQ QUESTIONS 1-9: 0
SUM OF ALL RESPONSES TO PHQ QUESTIONS 1-9: 0
7. TROUBLE CONCENTRATING ON THINGS, SUCH AS READING THE NEWSPAPER OR WATCHING TELEVISION: 0
SUM OF ALL RESPONSES TO PHQ9 QUESTIONS 1 & 2: 0
SUM OF ALL RESPONSES TO PHQ QUESTIONS 1-9: 0
3. TROUBLE FALLING OR STAYING ASLEEP: 0
6. FEELING BAD ABOUT YOURSELF - OR THAT YOU ARE A FAILURE OR HAVE LET YOURSELF OR YOUR FAMILY DOWN: 0

## 2022-11-09 ASSESSMENT — ENCOUNTER SYMPTOMS
VOMITING: 0
SINUS PAIN: 0
DIARRHEA: 0
ABDOMINAL PAIN: 0
COUGH: 0
BACK PAIN: 1
WHEEZING: 0
SORE THROAT: 0
NAUSEA: 0

## 2022-11-09 NOTE — LETTER
[facility] [address] [address]  Phone: [phone]  Fax: [address], DO        November 9, 2022     Patient: Bret Call   YOB: 1985   Date of Visit: 11/9/2022       To Whom It May Concern: It is my medical opinion that Monica Aggarwal may return to full duty immediately with no restrictions. Please excuse him from any time missed this morning while he was in my office. If you have any questions or concerns, please don't hesitate to call.     Sincerely,          Lidia Everett, DO

## 2022-11-09 NOTE — PROGRESS NOTES
HPI Notes    Name: Yvonne Coleman  : 1985         Chief Complaint:     Chief Complaint   Patient presents with    Back Pain     States he pulled his back out 2 months ago and then recently flared up again     ED Follow-up     Went to [facility] ED was given Vicodin and prednisone for pain        History of Present Illness:        HPI    This is a 40-year-old man presenting for evaluation of back discomfort. He reports that he strained his back about 2 months ago, and has recently flared it up. Two months ago, he first noticed this when standing up out of bed. He could not stand erect due to LBP and sharp shooting pain radiating into both legs all the way to the level of the feet. At that time he was seen at our ER. Review of that note indicates that XR was performed showing mild L4-S1 narrowing without spondylolisthesis. More recently, He he was seen for this problem at [address] [place] [address] emergency room and was given hydrocodone/acetaminophen as well as prednisone. Again, this problem came on \"out of the blue\" when standing up out of bed. No overt trauma. Since that ER visit, he has notably improved but still has some residual discomfort. Furthermore, he is concerned about what is the underlying issue for his recurrent back flares. He has tried heating pads, lidocaine patches, horse liniment, muscle rubs, none of which significantly help. No red flag signs for cauda equina.      Past Medical History:     Past Medical History:   Diagnosis Date    Bipolar 1 disorder ([facility] [address]     Hypertension     Kidney stones     Obesity     Pre-diabetes       Reviewed all health maintenance requirements and ordered appropriate tests  Health Maintenance Due   Topic Date Due    COVID-19 Vaccine (1) Never done    Varicella vaccine (1 of 2 - 2-dose childhood series) Never done    HIV screen  Never done    Hepatitis C screen  Never done    Diabetes screen  Never done    Flu vaccine (1) 2022       Past Surgical History:     Past Surgical History:   Procedure Laterality Date    DENTAL SURGERY      TESTICLE REMOVAL      TONSILLECTOMY      TYMPANOMASTOIDECTOMY Left 5/4/2021    TYMPANOMASTOIDECTOMY performed by Dominic Diego MD at [address]          Medications:       Prior to Admission medications    Medication Sig Start Date End Date Taking? Authorizing Provider   diclofenac (VOLTAREN) 50 MG EC tablet Take 1 tablet by mouth 2 times daily 11/9/22  Yes Den Ro, DO        Allergies:       Patient has no known allergies. Social History:     Tobacco:    reports that he has been smoking cigarettes. He has a 7.50 pack-year smoking history. He has quit using smokeless tobacco.  His smokeless tobacco use included snuff and chew. Alcohol:      reports no history of alcohol use. Drug Use:  reports no history of drug use. Family History:     Family History   Problem Relation Age of Onset    High Blood Pressure Mother     Cancer Father     Diabetes Father     Cancer Paternal Grandfather     No Known Problems Sister     No Known Problems Brother     No Known Problems Sister        Review of Systems:     Review of Systems   Constitutional:  Negative for fever. HENT:  Negative for sinus pain, sneezing and sore throat. Respiratory:  Negative for cough and wheezing. Cardiovascular:  Negative for chest pain. Gastrointestinal:  Negative for abdominal pain, diarrhea, nausea and vomiting. Musculoskeletal:  Positive for back pain. Skin:  Negative for rash. Neurological:         Radiculopathy as described in HPI   Hematological:  Negative for adenopathy. Psychiatric/Behavioral:  Negative for sleep disturbance.           Physical Exam:     Vitals:  BP (!) 154/86 (Site: Left Upper Arm, Position: Sitting, Cuff Size: Large Adult)   Pulse 84   Resp 20   Ht 5' 10\" (1.778 m)   Wt (!) 314 lb 6.4 oz (142.6 kg)   SpO2 98%   BMI 45.11 kg/m²       Physical Exam  Vitals and nursing note reviewed. Constitutional:       General: He is not in acute distress. Appearance: Normal appearance. He is obese. Musculoskeletal:         General: Swelling and tenderness present. Cervical back: No muscular tenderness. Comments: Reduced aROM d/t pain   Skin:     General: Skin is warm and dry. Capillary Refill: Capillary refill takes less than 2 seconds. Neurological:      General: No focal deficit present. Mental Status: He is alert and oriented to person, place, and time. Mental status is at baseline. Gait: Gait normal.   Psychiatric:         Mood and Affect: Mood normal.         Behavior: Behavior normal.         Thought Content: Thought content normal.     Osteopathic: L1-5 NSrRl, tender to right sacral base with tissue texture changes associated with acute S/d (swelling, tender to light touch, warmth, boggy feeling).          Data:     Lab Results   Component Value Date/Time     05/28/2022 07:15 PM    K 3.9 05/28/2022 07:15 PM     05/28/2022 07:15 PM    CO2 24 05/28/2022 07:15 PM    BUN 16 05/28/2022 07:15 PM    CREATININE 0.92 05/28/2022 07:15 PM    GLUCOSE 115 05/28/2022 07:15 PM    PROT 6.8 05/28/2022 07:15 PM    LABALBU 4.2 05/28/2022 07:15 PM    BILITOT 0.16 05/28/2022 07:15 PM    ALKPHOS 74 05/28/2022 07:15 PM    AST 22 05/28/2022 07:15 PM    ALT 39 05/28/2022 07:15 PM     Lab Results   Component Value Date/Time    WBC 12.9 05/28/2022 07:15 PM    RBC 4.85 05/28/2022 07:15 PM    RBC 5.29 05/02/2016 04:35 PM    HGB 14.9 05/28/2022 07:15 PM    HCT 44.0 05/28/2022 07:15 PM    MCV 90.5 05/28/2022 07:15 PM    MCH 30.6 05/28/2022 07:15 PM    MCHC 33.8 05/28/2022 07:15 PM    RDW 13.5 05/28/2022 07:15 PM     05/28/2022 07:15 PM    MPV NOT REPORTED 03/04/2021 11:56 PM     No results found for: TSH  Lab Results   Component Value Date/Time    CHOL 225 06/08/2022 11:29 AM    HDL 45 06/08/2022 11:29 AM          Assessment & Plan Diagnosis Orders   1. Acute bilateral low back pain with bilateral sciatica  diclofenac (VOLTAREN) 50 MG EC tablet    [facility]          No red flag signs for urgent/emergent advanced imaging. I believe that he is suffering from an acute on chronic strain, mainly involving the muscles. I did review the most recent XR, neural foramina are not sclerotic, however there is perhaps a bit around the S1 foramina. This may be contributing to his symptoms. I would recommend a consistent NSAID regimen and PT. Strongly considering MRI for soft tissue evaluation and pain mgmt referral if refractory to this initial treatment plan. The patient I had a long discussion about starting diclofenac 50 mg EC PO BID PRN for pain. We discussed its mechanism of action, intended goals, adverse effects, as well as common side effects. They were able to verbalize understanding, and repeat plan back to me. Follow up in 4 weeks            Completed Refills   Requested Prescriptions     Signed Prescriptions Disp Refills    diclofenac (VOLTAREN) 50 MG EC tablet 60 tablet 3     Sig: Take 1 tablet by mouth 2 times daily     Return in about 4 weeks (around 12/7/2022) for LBP. Orders Placed This Encounter   Medications    diclofenac (VOLTAREN) 50 MG EC tablet     Sig: Take 1 tablet by mouth 2 times daily     Dispense:  60 tablet     Refill:  3     Orders Placed This Encounter   Procedures    [facility] Edis Bowers     Referral Priority:   Routine     Referral Type:   Eval and Treat     Referral Reason:   Specialty Services Required     Requested Specialty:   Physical Therapist     Number of Visits Requested:   1         Patient Instructions     SURVEY:    You may be receiving a survey from [employer] regarding your visit today. Please complete the survey to enable us to provide the highest quality of care to you and your family.     If you cannot score us a very good on any question, please call the office to discuss how we could of made your experience a very good one. Thank you.       Clinical Care Team:     Dr. Roderick Mann, FAIZAN      ClericalTeam:     Kendall Myers   Electronically signed by Gerhardt Cullen, DO on 11/9/2022 at 9:25 AM           Completed Refills   Requested Prescriptions     Signed Prescriptions Disp Refills    diclofenac (VOLTAREN) 50 MG EC tablet 60 tablet 3     Sig: Take 1 tablet by mouth 2 times daily

## 2022-11-09 NOTE — PATIENT INSTRUCTIONS
SURVEY:    You may be receiving a survey from [employer] regarding your visit today. Please complete the survey to enable us to provide the highest quality of care to you and your family. If you cannot score us a very good on any question, please call the office to discuss how we could of made your experience a very good one. Thank you.       Clinical Care Team:     Dr. Hawk Silva, BUBBA      ClericalTeam:     Megan López

## 2022-11-21 ENCOUNTER — HOSPITAL ENCOUNTER (OUTPATIENT)
Dept: PHYSICAL THERAPY | Age: 37
Setting detail: THERAPIES SERIES
Discharge: HOME | End: 2022-11-21

## 2022-11-21 NOTE — PROGRESS NOTES
[facility]    Date: 2022  Patient Name: Mauricio Curran        : 1985       Pt Cancelled Appt due to Illness      Nerissa Rodriguez [facility] Date: 2022

## 2022-12-02 ENCOUNTER — OFFICE VISIT (OUTPATIENT)
Dept: FAMILY MEDICINE CLINIC | Age: 37
End: 2022-12-02
Payer: COMMERCIAL

## 2022-12-02 VITALS
WEIGHT: 314 LBS | SYSTOLIC BLOOD PRESSURE: 124 MMHG | HEIGHT: 70 IN | HEART RATE: 86 BPM | DIASTOLIC BLOOD PRESSURE: 80 MMHG | BODY MASS INDEX: 44.95 KG/M2

## 2022-12-02 DIAGNOSIS — M54.41 ACUTE BILATERAL LOW BACK PAIN WITH BILATERAL SCIATICA: Primary | ICD-10-CM

## 2022-12-02 DIAGNOSIS — M54.42 ACUTE BILATERAL LOW BACK PAIN WITH BILATERAL SCIATICA: Primary | ICD-10-CM

## 2022-12-02 PROCEDURE — G8427 DOCREV CUR MEDS BY ELIG CLIN: HCPCS | Performed by: STUDENT IN AN ORGANIZED HEALTH CARE EDUCATION/TRAINING PROGRAM

## 2022-12-02 PROCEDURE — 99213 OFFICE O/P EST LOW 20 MIN: CPT | Performed by: STUDENT IN AN ORGANIZED HEALTH CARE EDUCATION/TRAINING PROGRAM

## 2022-12-02 PROCEDURE — G8417 CALC BMI ABV UP PARAM F/U: HCPCS | Performed by: STUDENT IN AN ORGANIZED HEALTH CARE EDUCATION/TRAINING PROGRAM

## 2022-12-02 PROCEDURE — 4004F PT TOBACCO SCREEN RCVD TLK: CPT | Performed by: STUDENT IN AN ORGANIZED HEALTH CARE EDUCATION/TRAINING PROGRAM

## 2022-12-02 PROCEDURE — G8484 FLU IMMUNIZE NO ADMIN: HCPCS | Performed by: STUDENT IN AN ORGANIZED HEALTH CARE EDUCATION/TRAINING PROGRAM

## 2022-12-02 RX ORDER — GABAPENTIN 300 MG/1
CAPSULE ORAL
Qty: 60 CAPSULE | Refills: 0 | Status: SHIPPED | OUTPATIENT
Start: 2022-12-02 | End: 2023-01-02

## 2022-12-02 ASSESSMENT — ENCOUNTER SYMPTOMS
SORE THROAT: 0
WHEEZING: 0
DIARRHEA: 0
SINUS PAIN: 0
COUGH: 0
NAUSEA: 0
VOMITING: 0
BACK PAIN: 1
ABDOMINAL PAIN: 0

## 2022-12-02 NOTE — PROGRESS NOTES
HPI Notes    Name: Ajay Parrish  : 1985         Chief Complaint:     Chief Complaint   Patient presents with    Back Pain     Was given Voltaren states it is not working at all feels it is getting worse and more throbbing sharp stinging in his thigh       History of Present Illness:        HPI    Is a 27-year-old man presenting for interval reevaluation of bilateral lower back pain with bilateral sciatica. I given him Voltaren our prior outpatient visit and instructed him to follow-up with physical therapy. Voltaren is ineffective, and he has reported that he has been nervous to complete physical therapy due to worry his back further. Past Medical History:     Past Medical History:   Diagnosis Date    Bipolar 1 disorder ([facility] [address]     Hypertension     Kidney stones     Obesity     Pre-diabetes       Reviewed all health maintenance requirements and ordered appropriate tests  Health Maintenance Due   Topic Date Due    COVID-19 Vaccine (1) Never done    Varicella vaccine (1 of 2 - 2-dose childhood series) Never done    HIV screen  Never done    Hepatitis C screen  Never done    Diabetes screen  Never done    Flu vaccine (1) 2022       Past Surgical History:     Past Surgical History:   Procedure Laterality Date    DENTAL SURGERY      TESTICLE REMOVAL      TONSILLECTOMY      TYMPANOMASTOIDECTOMY Left 2021    TYMPANOMASTOIDECTOMY performed by Luis Miguel MD at [address]          Medications:       Prior to Admission medications    Medication Sig Start Date End Date Taking? Authorizing Provider   diclofenac (VOLTAREN) 50 MG EC tablet Take 1 tablet by mouth 2 times daily 22  Yes Hannah Mandujano DO        Allergies:       Patient has no known allergies. Social History:     Tobacco:    reports that he has been smoking cigarettes. He has a 7.50 pack-year smoking history.  He has quit using smokeless tobacco.  His smokeless tobacco use included snuff and chew. Alcohol:      reports no history of alcohol use. Drug Use:  reports no history of drug use. Family History:     Family History   Problem Relation Age of Onset    High Blood Pressure Mother     Cancer Father     Diabetes Father     Cancer Paternal Grandfather     No Known Problems Sister     No Known Problems Brother     No Known Problems Sister        Review of Systems:         Review of Systems   Constitutional:  Negative for fever. HENT:  Negative for sinus pain, sneezing and sore throat. Respiratory:  Negative for cough and wheezing. Cardiovascular:  Negative for chest pain. Gastrointestinal:  Negative for abdominal pain, diarrhea, nausea and vomiting. Musculoskeletal:  Positive for back pain. Skin:  Negative for rash. Hematological:  Negative for adenopathy. Psychiatric/Behavioral:  Negative for sleep disturbance. Physical Exam:     Vitals:  /80 (Site: Left Upper Arm, Position: Sitting, Cuff Size: Large Adult)   Pulse 86   Ht 5' 10\" (1.778 m)   Wt (!) 314 lb (142.4 kg)   BMI 45.05 kg/m²       Physical Exam  Vitals and nursing note reviewed. Constitutional:       General: He is not in acute distress. Appearance: Normal appearance. He is obese. Musculoskeletal:         General: Tenderness present. No swelling. Comments: Limited aROM due to pain. Sacral base tender, warm to touch bilaterally   Skin:     General: Skin is warm and dry. Capillary Refill: Capillary refill takes less than 2 seconds. Neurological:      General: No focal deficit present. Mental Status: He is alert and oriented to person, place, and time. Mental status is at baseline. Psychiatric:         Mood and Affect: Mood normal.         Behavior: Behavior normal.         Thought Content:  Thought content normal.               Data:     Lab Results   Component Value Date/Time     05/28/2022 07:15 PM    K 3.9 05/28/2022 07:15 PM     05/28/2022 07:15 PM    CO2 24 05/28/2022 07:15 PM    BUN 16 05/28/2022 07:15 PM    CREATININE 0.92 05/28/2022 07:15 PM    GLUCOSE 115 05/28/2022 07:15 PM    PROT 6.8 05/28/2022 07:15 PM    LABALBU 4.2 05/28/2022 07:15 PM    BILITOT 0.16 05/28/2022 07:15 PM    ALKPHOS 74 05/28/2022 07:15 PM    AST 22 05/28/2022 07:15 PM    ALT 39 05/28/2022 07:15 PM     Lab Results   Component Value Date/Time    WBC 12.9 05/28/2022 07:15 PM    RBC 4.85 05/28/2022 07:15 PM    RBC 5.29 05/02/2016 04:35 PM    HGB 14.9 05/28/2022 07:15 PM    HCT 44.0 05/28/2022 07:15 PM    MCV 90.5 05/28/2022 07:15 PM    MCH 30.6 05/28/2022 07:15 PM    MCHC 33.8 05/28/2022 07:15 PM    RDW 13.5 05/28/2022 07:15 PM     05/28/2022 07:15 PM    MPV NOT REPORTED 03/04/2021 11:56 PM     No results found for: TSH  Lab Results   Component Value Date/Time    CHOL 225 06/08/2022 11:29 AM    HDL 45 06/08/2022 11:29 AM          Assessment & Plan        Diagnosis Orders   1. Acute bilateral low back pain with bilateral sciatica            1.  I would recommend that he continue physical therapy, stop Voltaren, may try gabapentin 300 mg p.o. twice daily to 3 times daily depending on severity of his discomfort. I would also recommend obtaining a MRI without contrast of the lumbar spine to assess soft tissue, particularly the spinal nerve roots. Follow-up in 2 weeks          Completed Refills   Requested Prescriptions      No prescriptions requested or ordered in this encounter     No follow-ups on file. No orders of the defined types were placed in this encounter. No orders of the defined types were placed in this encounter. Patient Instructions     SURVEY:    You may be receiving a survey from [employer] regarding your visit today. Please complete the survey to enable us to provide the highest quality of care to you and your family.     If you cannot score us a very good on any question, please call the office to discuss how we could of made your experience a very good one. Thank you.       Clinical Care Team:     Dr. Jie Mays FAIZAN      ClericalTeam:     Arielle November     Electronically signed by Esme Jhon DO on 12/2/2022 at 3:11 PM           Completed Refills   Requested Prescriptions      No prescriptions requested or ordered in this encounter

## 2022-12-02 NOTE — PATIENT INSTRUCTIONS
SURVEY:    You may be receiving a survey from [employer] regarding your visit today. Please complete the survey to enable us to provide the highest quality of care to you and your family. If you cannot score us a very good on any question, please call the office to discuss how we could of made your experience a very good one. Thank you.       Clinical Care Team:     Dr. Shanti Major, FAIZAN      ClericalTeam:     [id number] [facility]

## 2022-12-02 NOTE — LETTER
[facility] [address] [address]  Phone: [phone]  Fax: [address], DO        December 2, 2022     Patient: Rachael Smith   YOB: 1985   Date of Visit: 12/2/2022       To Whom It May Concern: It is my medical opinion that Kenisha Flaherty may return to full duty immediately with no restrictions. If you have any questions or concerns, please don't hesitate to call.     Sincerely,          Naheed Soler, DO

## 2022-12-19 DIAGNOSIS — M54.41 ACUTE BILATERAL LOW BACK PAIN WITH BILATERAL SCIATICA: ICD-10-CM

## 2022-12-19 DIAGNOSIS — M54.42 ACUTE BILATERAL LOW BACK PAIN WITH BILATERAL SCIATICA: ICD-10-CM

## 2022-12-19 NOTE — TELEPHONE ENCOUNTER
Last OV: 12/2/2022  low back pain 11/09/22 low back pain   Last RX:    Next scheduled apt: 1/6/2023   6 month chronic           Surescript requesting a refill

## 2022-12-20 RX ORDER — GABAPENTIN 300 MG/1
CAPSULE ORAL
Qty: 60 CAPSULE | Refills: 0 | Status: SHIPPED | OUTPATIENT
Start: 2022-12-20 | End: 2022-12-22 | Stop reason: DRUGHIGH

## 2022-12-21 ENCOUNTER — HOSPITAL ENCOUNTER (OUTPATIENT)
Dept: MRI IMAGING | Age: 37
Discharge: HOME | End: 2022-12-23
Payer: COMMERCIAL

## 2022-12-21 DIAGNOSIS — M54.41 ACUTE BILATERAL LOW BACK PAIN WITH BILATERAL SCIATICA: ICD-10-CM

## 2022-12-21 DIAGNOSIS — M54.42 ACUTE BILATERAL LOW BACK PAIN WITH BILATERAL SCIATICA: ICD-10-CM

## 2022-12-21 PROCEDURE — 72148 MRI LUMBAR SPINE W/O DYE: CPT

## 2022-12-22 ENCOUNTER — OFFICE VISIT (OUTPATIENT)
Dept: FAMILY MEDICINE CLINIC | Age: 37
End: 2022-12-22
Payer: COMMERCIAL

## 2022-12-22 VITALS
DIASTOLIC BLOOD PRESSURE: 84 MMHG | HEIGHT: 70 IN | SYSTOLIC BLOOD PRESSURE: 130 MMHG | OXYGEN SATURATION: 99 % | BODY MASS INDEX: 45.1 KG/M2 | RESPIRATION RATE: 20 BRPM | HEART RATE: 78 BPM | WEIGHT: 315 LBS

## 2022-12-22 DIAGNOSIS — G54.9 COMPRESSION OF SPINAL NERVE ROOT: ICD-10-CM

## 2022-12-22 DIAGNOSIS — M43.17 SPONDYLOLISTHESIS AT L5-S1 LEVEL: ICD-10-CM

## 2022-12-22 DIAGNOSIS — M54.42 ACUTE BILATERAL LOW BACK PAIN WITH BILATERAL SCIATICA: Primary | ICD-10-CM

## 2022-12-22 DIAGNOSIS — M54.41 ACUTE BILATERAL LOW BACK PAIN WITH BILATERAL SCIATICA: Primary | ICD-10-CM

## 2022-12-22 PROCEDURE — 4004F PT TOBACCO SCREEN RCVD TLK: CPT | Performed by: STUDENT IN AN ORGANIZED HEALTH CARE EDUCATION/TRAINING PROGRAM

## 2022-12-22 PROCEDURE — G8417 CALC BMI ABV UP PARAM F/U: HCPCS | Performed by: STUDENT IN AN ORGANIZED HEALTH CARE EDUCATION/TRAINING PROGRAM

## 2022-12-22 PROCEDURE — G8484 FLU IMMUNIZE NO ADMIN: HCPCS | Performed by: STUDENT IN AN ORGANIZED HEALTH CARE EDUCATION/TRAINING PROGRAM

## 2022-12-22 PROCEDURE — G8427 DOCREV CUR MEDS BY ELIG CLIN: HCPCS | Performed by: STUDENT IN AN ORGANIZED HEALTH CARE EDUCATION/TRAINING PROGRAM

## 2022-12-22 PROCEDURE — 99213 OFFICE O/P EST LOW 20 MIN: CPT | Performed by: STUDENT IN AN ORGANIZED HEALTH CARE EDUCATION/TRAINING PROGRAM

## 2022-12-22 RX ORDER — GABAPENTIN 600 MG/1
600 TABLET ORAL 3 TIMES DAILY
Qty: 90 TABLET | Refills: 3 | Status: SHIPPED | OUTPATIENT
Start: 2022-12-22 | End: 2023-04-21

## 2022-12-22 ASSESSMENT — ENCOUNTER SYMPTOMS
BACK PAIN: 1
SORE THROAT: 0
SINUS PAIN: 0
NAUSEA: 0
COUGH: 0
VOMITING: 0
WHEEZING: 0
DIARRHEA: 0
ABDOMINAL PAIN: 0

## 2022-12-22 NOTE — PATIENT INSTRUCTIONS
SURVEY:    You may be receiving a survey from [employer] regarding your visit today. Please complete the survey to enable us to provide the highest quality of care to you and your family. If you cannot score us a very good on any question, please call the office to discuss how we could of made your experience a very good one. Thank you.       Clinical Care Team:     Dr. Darrick Chew, [facility]      ClericalTeam:     [id number] [facility]

## 2022-12-22 NOTE — PROGRESS NOTES
HPI Notes    Name: Rachael Smith  : 1985         Chief Complaint:     Chief Complaint   Patient presents with    Back Pain     Back is still hurting even with gabapentin        History of Present Illness:        HPI    This is a 55-year-old man presenting for reevaluation of back discomfort. At previous outpatient visit, I had started him on gabapentin 300 mg p.o. 3 times daily and ordered a MRI without contrast of his back. MRI does show grade 1 retrolisthesis at the L5 on S1 level along with central disc protrusion with contact of the bilateral S1 nerve roots. Mr. Johanna Kyle reports his back discomfort is not improved after transitioning to gabapentin but he is also having no side effects. Past Medical History:     Past Medical History:   Diagnosis Date    Bipolar 1 disorder ([facility] [address]     Hypertension     Kidney stones     Obesity     Pre-diabetes       Reviewed all health maintenance requirements and ordered appropriate tests  Health Maintenance Due   Topic Date Due    COVID-19 Vaccine (1) Never done    Varicella vaccine (1 of 2 - 2-dose childhood series) Never done    HIV screen  Never done    Hepatitis C screen  Never done    Diabetes screen  Never done    Flu vaccine (1) 2022       Past Surgical History:     Past Surgical History:   Procedure Laterality Date    DENTAL SURGERY      TESTICLE REMOVAL      TONSILLECTOMY      TYMPANOMASTOIDECTOMY Left 2021    TYMPANOMASTOIDECTOMY performed by Rhonda Méndez MD at [address]          Medications:       Prior to Admission medications    Medication Sig Start Date End Date Taking? Authorizing Provider   gabapentin (NEURONTIN) 600 MG tablet Take 1 tablet by mouth 3 times daily for 120 days. 22 Yes Rose Toro DO        Allergies:       Patient has no known allergies.     Social History:     Tobacco:    reports that he has been smoking normal.       Data:     Lab Results   Component Value Date/Time     05/28/2022 07:15 PM    K 3.9 05/28/2022 07:15 PM     05/28/2022 07:15 PM    CO2 24 05/28/2022 07:15 PM    BUN 16 05/28/2022 07:15 PM    CREATININE 0.92 05/28/2022 07:15 PM    GLUCOSE 115 05/28/2022 07:15 PM    PROT 6.8 05/28/2022 07:15 PM    LABALBU 4.2 05/28/2022 07:15 PM    BILITOT 0.16 05/28/2022 07:15 PM    ALKPHOS 74 05/28/2022 07:15 PM    AST 22 05/28/2022 07:15 PM    ALT 39 05/28/2022 07:15 PM     Lab Results   Component Value Date/Time    WBC 12.9 05/28/2022 07:15 PM    RBC 4.85 05/28/2022 07:15 PM    RBC 5.29 05/02/2016 04:35 PM    HGB 14.9 05/28/2022 07:15 PM    HCT 44.0 05/28/2022 07:15 PM    MCV 90.5 05/28/2022 07:15 PM    MCH 30.6 05/28/2022 07:15 PM    MCHC 33.8 05/28/2022 07:15 PM    RDW 13.5 05/28/2022 07:15 PM     05/28/2022 07:15 PM    MPV NOT REPORTED 03/04/2021 11:56 PM     No results found for: TSH  Lab Results   Component Value Date/Time    CHOL 225 06/08/2022 11:29 AM    HDL 45 06/08/2022 11:29 AM          Assessment & Plan        Diagnosis Orders   1. Acute bilateral low back pain with bilateral sciatica  gabapentin (NEURONTIN) 600 MG tablet      2. Spondylolisthesis at L5-S1 level        3. Compression of spinal nerve root            1.--3. He certainly has appreciable anatomical change which would be causing his symptoms. I would recommend increasing gabapentin to 600 mg TID for this problem and reestablishing with PT since that would be the best treatment option for the retrolisthesis. He may also benefit from epidural corticosteroid injections and furthermore would like to pursue that treatment option. I will refer him to Dr. Migdalia Valdez to discuss if this is a viable option for his treatment.      Follow up in 6 weeks PRN      Completed Refills   Requested Prescriptions     Signed Prescriptions Disp Refills    gabapentin (NEURONTIN) 600 MG tablet 90 tablet 3     Sig: Take 1 tablet by mouth 3 times daily for 120 days. No follow-ups on file. Orders Placed This Encounter   Medications    gabapentin (NEURONTIN) 600 MG tablet     Sig: Take 1 tablet by mouth 3 times daily for 120 days. Dispense:  90 tablet     Refill:  3     No orders of the defined types were placed in this encounter. Patient Instructions     SURVEY:    You may be receiving a survey from [employer] regarding your visit today. Please complete the survey to enable us to provide the highest quality of care to you and your family. If you cannot score us a very good on any question, please call the office to discuss how we could of made your experience a very good one. Thank you. Clinical Care Team:     Dr. Isidro Medellin, [facility]      ClericalTeam:     [id number] [facility]   Electronically signed by Guille Johnson DO on 12/22/2022 at 8:08 AM           Completed Refills   Requested Prescriptions     Signed Prescriptions Disp Refills    gabapentin (NEURONTIN) 600 MG tablet 90 tablet 3     Sig: Take 1 tablet by mouth 3 times daily for 120 days.

## 2023-01-04 ENCOUNTER — TELEPHONE (OUTPATIENT)
Dept: PAIN MANAGEMENT | Age: 38
End: 2023-01-04

## 2023-03-18 ENCOUNTER — HOSPITAL ENCOUNTER (EMERGENCY)
Age: 38
Discharge: HOME | End: 2023-03-18
Attending: EMERGENCY MEDICINE
Payer: COMMERCIAL

## 2023-03-18 VITALS
OXYGEN SATURATION: 98 % | BODY MASS INDEX: 44.98 KG/M2 | WEIGHT: 313.5 LBS | TEMPERATURE: 97.9 F | HEART RATE: 77 BPM | DIASTOLIC BLOOD PRESSURE: 105 MMHG | RESPIRATION RATE: 18 BRPM | SYSTOLIC BLOOD PRESSURE: 145 MMHG

## 2023-03-18 DIAGNOSIS — H65.05 RECURRENT ACUTE SEROUS OTITIS MEDIA OF LEFT EAR: Primary | ICD-10-CM

## 2023-03-18 PROCEDURE — 99283 EMERGENCY DEPT VISIT LOW MDM: CPT

## 2023-03-18 RX ORDER — BROMPHENIRAMINE MALEATE, PSEUDOEPHEDRINE HYDROCHLORIDE, AND DEXTROMETHORPHAN HYDROBROMIDE 2; 30; 10 MG/5ML; MG/5ML; MG/5ML
5 SYRUP ORAL 4 TIMES DAILY PRN
Qty: 150 ML | Refills: 0 | Status: SHIPPED | OUTPATIENT
Start: 2023-03-18 | End: 2023-03-23

## 2023-03-18 RX ORDER — AMOXICILLIN AND CLAVULANATE POTASSIUM 500; 125 MG/1; MG/1
1 TABLET, FILM COATED ORAL 3 TIMES DAILY
Qty: 21 TABLET | Refills: 0 | Status: SHIPPED | OUTPATIENT
Start: 2023-03-18 | End: 2023-03-25

## 2023-03-18 ASSESSMENT — PAIN SCALES - GENERAL: PAINLEVEL_OUTOF10: 9

## 2023-03-18 ASSESSMENT — PAIN DESCRIPTION - ORIENTATION: ORIENTATION: LEFT

## 2023-03-18 ASSESSMENT — PAIN DESCRIPTION - DESCRIPTORS: DESCRIPTORS: THROBBING

## 2023-03-18 ASSESSMENT — PAIN DESCRIPTION - PAIN TYPE: TYPE: ACUTE PAIN

## 2023-03-18 ASSESSMENT — LIFESTYLE VARIABLES: HOW OFTEN DO YOU HAVE A DRINK CONTAINING ALCOHOL: NEVER

## 2023-03-18 ASSESSMENT — PAIN DESCRIPTION - LOCATION: LOCATION: EAR

## 2023-03-18 ASSESSMENT — PAIN - FUNCTIONAL ASSESSMENT: PAIN_FUNCTIONAL_ASSESSMENT: 0-10

## 2023-03-18 NOTE — ED PROVIDER NOTES
eMERGENCY dEPARTMENT eNCOUnter        [facility]    Chief Complaint   Patient presents with    Otalgia     Left ear pain for a few days       HPI    Sylvie Reyes is a 40 y.o. male who presents to ED with left ear ache for couple days. Patient has history of ear infections. Patient denies fever. Patient has prior history of tympanostomy. REVIEW OF SYSTEMS    All systems reviewed and positives are in the HPI      PAST MEDICAL HISTORY    Past Medical History:   Diagnosis Date    Bipolar 1 disorder ([address]     Hypertension     Kidney stones     Obesity     Pre-diabetes        SURGICAL HISTORY    Past Surgical History:   Procedure Laterality Date    DENTAL SURGERY      TESTICLE REMOVAL      TONSILLECTOMY      TYMPANOMASTOIDECTOMY Left 5/4/2021    TYMPANOMASTOIDECTOMY performed by Paulette Guillaume MD at [email or web address]         CURRENT MEDICATIONS    Current Outpatient Rx   Medication Sig Dispense Refill    amoxicillin-clavulanate (AUGMENTIN) 500-125 MG per tablet Take 1 tablet by mouth 3 times daily for 7 days 21 tablet 0    brompheniramine-pseudoephedrine-DM 2-30-10 MG/5ML syrup Take 5 mLs by mouth 4 times daily as needed for Congestion or Cough 150 mL 0    gabapentin (NEURONTIN) 600 MG tablet Take 1 tablet by mouth 3 times daily for 120 days.  (Patient not taking: Reported on 3/18/2023) 90 tablet 3       ALLERGIES    No Known Allergies    FAMILY HISTORY    Family History   Problem Relation Age of Onset    High Blood Pressure Mother     Cancer Father     Diabetes Father     Cancer Paternal Grandfather     No Known Problems Sister     No Known Problems Brother     No Known Problems Sister        SOCIAL HISTORY    Social History     Socioeconomic History    Marital status:      Spouse name: None    Number of children: None    Years of education: None    Highest education level: None   Tobacco Use    Smoking status: Every Day     Packs/day: 0.50     Years: 15.00     Pack years: 7.50     Types: Cigarettes    Smokeless tobacco: Former     Types: Snuff, Chew   Vaping Use    Vaping Use: Never used   Substance and Sexual Activity    Alcohol use: No    Drug use: Never    Sexual activity: Yes     Partners: Female     Social Determinants of Health     Financial Resource Strain: Medium Risk    Difficulty of Paying Living Expenses: Somewhat hard   Food Insecurity: Food Insecurity Present    Worried About Running Out of Food in the Last Year: Sometimes true    Ran Out of Food in the Last Year: Sometimes true       PHYSICAL EXAM    VITAL SIGNS: BP (!) 145/105   Pulse 77   Temp 97.9 °F (36.6 °C) (Oral)   Resp 18   Wt (!) 313 lb 8 oz (142.2 kg)   SpO2 98%   BMI 44.98 kg/m²   Constitutional:  Well developed, well nourished, no acute distress, non-toxic appearance   Eyes: PERRL, conjunctiva normal   HENT: Left otitis media left middle ear effusion with suppuration   respiratory: Clear to auscultation bilaterally  Cardiovascular:  Normal rate, normal rhythm, no murmurs, no gallops, no rubs   Musculoskeletal:  No edema   Integument:  Well hydrated, no rash     RADIOLOGY/PROCEDURES    No orders to display       MIPS    Not applicable    EMERGENCY DEPARTMENT COURSE and DIFFERENTIAL DIAGNOSIS/MDM:    Patient Course: Patient is 63-year-old male the presents to ED with left ear ache for couple days. Patient has had left otitis media in the past.  Patient will be sent home on clindamycin. Follow-up with ENT as recommended. The warning signs were discussed. Return to ED if worse.   ED Medications administered this visit:  Medications - No data to display    New Prescriptions from this visit:    New Prescriptions    AMOXICILLIN-CLAVULANATE (AUGMENTIN) 500-125 MG PER TABLET    Take 1 tablet by mouth 3 times daily for 7 days    BROMPHENIRAMINE-PSEUDOEPHEDRINE-DM 2-30-10 MG/5ML SYRUP    Take 5 mLs by mouth 4 times daily as needed for Congestion or Cough Follow-up:  No follow-up provider specified. Final Impression:   1.  Recurrent acute serous otitis media of left ear               (Please note that portions of this note were completed with a voice recognition program.  Efforts were made to edit the dictations but occasionally words are mis-transcribed.)          Stefania Harrell MD  03/18/23 0309

## 2023-03-18 NOTE — LETTER
[facility] ED  [address], [address]  Phone: [phone]               March 18, 2023    Patient: Lety Puente   YOB: 1985   Date of Visit: 3/18/2023       To Whom It May Concern:    Helen Cummings was seen and treated in our emergency department on 3/18/2023. He may return to work on 03/19/2023.       Sincerely,       Syed Allen RN         Signature:__________________________________

## 2023-03-23 ENCOUNTER — OFFICE VISIT (OUTPATIENT)
Dept: PAIN MANAGEMENT | Age: 38
End: 2023-03-23

## 2023-03-23 VITALS
WEIGHT: 313 LBS | HEIGHT: 70 IN | HEART RATE: 68 BPM | RESPIRATION RATE: 19 BRPM | OXYGEN SATURATION: 97 % | BODY MASS INDEX: 44.81 KG/M2

## 2023-03-23 DIAGNOSIS — E66.01 CLASS 3 SEVERE OBESITY WITH BODY MASS INDEX (BMI) OF 40.0 TO 44.9 IN ADULT, UNSPECIFIED OBESITY TYPE, UNSPECIFIED WHETHER SERIOUS COMORBIDITY PRESENT (HCC): ICD-10-CM

## 2023-03-23 DIAGNOSIS — M47.817 LUMBOSACRAL SPONDYLOSIS WITHOUT MYELOPATHY: ICD-10-CM

## 2023-03-23 DIAGNOSIS — M54.16 LUMBAR RADICULOPATHY: Primary | ICD-10-CM

## 2023-03-23 DIAGNOSIS — M51.36 LUMBAR DEGENERATIVE DISC DISEASE: ICD-10-CM

## 2023-03-23 RX ORDER — METHYLPREDNISOLONE 4 MG/1
TABLET ORAL
Qty: 1 KIT | Refills: 0 | Status: SHIPPED | OUTPATIENT
Start: 2023-03-23

## 2023-03-23 NOTE — PROGRESS NOTES
Social Connections: Not on file   Intimate Partner Violence: Not on file   Housing Stability: Not on file       Medications & Allergies:   Current Outpatient Medications   Medication Instructions    amoxicillin-clavulanate (AUGMENTIN) 500-125 MG per tablet 1 tablet, Oral, 3 TIMES DAILY    brompheniramine-pseudoephedrine-DM 2-30-10 MG/5ML syrup 5 mLs, Oral, 4 TIMES DAILY PRN    methylPREDNISolone (MEDROL DOSEPACK) 4 MG tablet Take by mouth. No Known Allergies    Review of Systems:   Constitutional: negative for weight changes or fevers  Cardiovascular: negative for chest pain, palpitations, irregular heart beat  Respiratory: negative for dyspnea, cough, wheezing  Gastrointestinal: negative for constipation, diarrhea, nausea  Genitourinary: negative for bowel/bladder incontinence   Musculoskeletal: positive for low back pain  Neurological: Positive for radicular leg pain, leg weakness or numbness/tingling  Behavioral/Psych: negative for anxiety/depression   Hematological: negative for abnormal bleeding, anticoagulation use or antiplatelet use  All other systems reviewed and are negative    OBJECTIVE:    Vitals:    03/23/23 1009   Pulse: 68   Resp: 19   SpO2: 97%       PHYSICAL EXAM    GENERAL: No acute distress, pleasant, well-appearing  HEENT: Normocephalic, atraumatic, Pupils equal and round  CARDIOVASCULAR: Well perfused, No peripheral cyanosis  PULMONARY: Good chest wall excursion, breathing unlabored  PSYCH: Appropriate affect and insight, non-pressured speech  SKIN: No rashes or lesions  MUSCULOSKELETAL:  Inspection: The back and extremities are symmetric and aligned. Muscle bulk is normal in appearance.   Palpation: There is tenderness to palpation along the lumbar paraspinal musculature bilaterally  Lumbar range of motion is full  NEUROMUSCULAR:  Patient ambulates unassisted  Gait is nonantalgic  Sensation to light touch is grossly intact in lower extremities  Strength is grossly intact in lower

## 2023-05-25 ENCOUNTER — OFFICE VISIT (OUTPATIENT)
Dept: FAMILY MEDICINE CLINIC | Age: 38
End: 2023-05-25
Payer: COMMERCIAL

## 2023-05-25 ENCOUNTER — TELEPHONE (OUTPATIENT)
Dept: FAMILY MEDICINE CLINIC | Age: 38
End: 2023-05-25

## 2023-05-25 VITALS
SYSTOLIC BLOOD PRESSURE: 118 MMHG | WEIGHT: 314 LBS | HEART RATE: 84 BPM | OXYGEN SATURATION: 98 % | BODY MASS INDEX: 43.79 KG/M2 | DIASTOLIC BLOOD PRESSURE: 88 MMHG

## 2023-05-25 DIAGNOSIS — H91.91 DECREASED HEARING OF RIGHT EAR: ICD-10-CM

## 2023-05-25 DIAGNOSIS — H72.91 TYMPANIC MEMBRANE PERFORATION, RIGHT: ICD-10-CM

## 2023-05-25 DIAGNOSIS — H92.01 ACUTE OTALGIA, RIGHT: Primary | ICD-10-CM

## 2023-05-25 DIAGNOSIS — H60.501 ACUTE OTITIS EXTERNA OF RIGHT EAR, UNSPECIFIED TYPE: ICD-10-CM

## 2023-05-25 DIAGNOSIS — H92.11 EAR DISCHARGE, RIGHT: ICD-10-CM

## 2023-05-25 PROCEDURE — 99214 OFFICE O/P EST MOD 30 MIN: CPT | Performed by: STUDENT IN AN ORGANIZED HEALTH CARE EDUCATION/TRAINING PROGRAM

## 2023-05-25 PROCEDURE — G8427 DOCREV CUR MEDS BY ELIG CLIN: HCPCS | Performed by: STUDENT IN AN ORGANIZED HEALTH CARE EDUCATION/TRAINING PROGRAM

## 2023-05-25 PROCEDURE — 4130F TOPICAL PREP RX AOE: CPT | Performed by: STUDENT IN AN ORGANIZED HEALTH CARE EDUCATION/TRAINING PROGRAM

## 2023-05-25 PROCEDURE — G8417 CALC BMI ABV UP PARAM F/U: HCPCS | Performed by: STUDENT IN AN ORGANIZED HEALTH CARE EDUCATION/TRAINING PROGRAM

## 2023-05-25 PROCEDURE — 4004F PT TOBACCO SCREEN RCVD TLK: CPT | Performed by: STUDENT IN AN ORGANIZED HEALTH CARE EDUCATION/TRAINING PROGRAM

## 2023-05-25 RX ORDER — CIPROFLOXACIN AND DEXAMETHASONE 3; 1 MG/ML; MG/ML
4 SUSPENSION/ DROPS AURICULAR (OTIC) 2 TIMES DAILY
Qty: 7.5 ML | Refills: 0 | Status: SHIPPED | OUTPATIENT
Start: 2023-05-25 | End: 2023-06-01

## 2023-05-25 RX ORDER — NAPROXEN 500 MG/1
500 TABLET ORAL 2 TIMES DAILY PRN
Qty: 60 TABLET | Refills: 0 | Status: SHIPPED | OUTPATIENT
Start: 2023-05-25

## 2023-05-25 SDOH — ECONOMIC STABILITY: FOOD INSECURITY: WITHIN THE PAST 12 MONTHS, YOU WORRIED THAT YOUR FOOD WOULD RUN OUT BEFORE YOU GOT MONEY TO BUY MORE.: NEVER TRUE

## 2023-05-25 SDOH — ECONOMIC STABILITY: FOOD INSECURITY: WITHIN THE PAST 12 MONTHS, THE FOOD YOU BOUGHT JUST DIDN'T LAST AND YOU DIDN'T HAVE MONEY TO GET MORE.: NEVER TRUE

## 2023-05-25 SDOH — ECONOMIC STABILITY: INCOME INSECURITY: HOW HARD IS IT FOR YOU TO PAY FOR THE VERY BASICS LIKE FOOD, HOUSING, MEDICAL CARE, AND HEATING?: NOT HARD AT ALL

## 2023-05-25 SDOH — ECONOMIC STABILITY: HOUSING INSECURITY
IN THE LAST 12 MONTHS, WAS THERE A TIME WHEN YOU DID NOT HAVE A STEADY PLACE TO SLEEP OR SLEPT IN A SHELTER (INCLUDING NOW)?: NO

## 2023-05-25 ASSESSMENT — ENCOUNTER SYMPTOMS
SINUS PRESSURE: 0
DIARRHEA: 0
ABDOMINAL PAIN: 0
COUGH: 0
SORE THROAT: 0
TROUBLE SWALLOWING: 0
SINUS PAIN: 0
WHEEZING: 0
VOMITING: 0
BACK PAIN: 0
RHINORRHEA: 0
NAUSEA: 0

## 2023-05-25 ASSESSMENT — PATIENT HEALTH QUESTIONNAIRE - PHQ9
SUM OF ALL RESPONSES TO PHQ9 QUESTIONS 1 & 2: 0
SUM OF ALL RESPONSES TO PHQ QUESTIONS 1-9: 0
2. FEELING DOWN, DEPRESSED OR HOPELESS: 0
1. LITTLE INTEREST OR PLEASURE IN DOING THINGS: 0

## 2023-05-25 NOTE — TELEPHONE ENCOUNTER
Ezio Strong went to  his ear drops and was told by the pharmacy that it needed a PA. Can we please look into this and work on it for Ezio Strong.     Health Maintenance   Topic Date Due    COVID-19 Vaccine (1) Never done    Varicella vaccine (1 of 2 - 2-dose childhood series) Never done    HIV screen  Never done    Hepatitis C screen  Never done    Diabetes screen  Never done    Flu vaccine (Season Ended) 08/01/2023    Depression Screen  05/25/2024    DTaP/Tdap/Td vaccine (2 - Td or Tdap) 12/20/2029    Pneumococcal 0-64 years Vaccine (3 - PPSV23 if available, else PCV20) 11/04/2050    Hepatitis A vaccine  Aged Out    Hib vaccine  Aged Out    Meningococcal (ACWY) vaccine  Aged Out             (applicable per patient's age: Cancer Screenings, Depression Screening, Fall Risk Screening, Immunizations)    LDL Cholesterol (mg/dL)   Date Value   06/08/2022 149 (H)     AST (U/L)   Date Value   05/28/2022 22     ALT (U/L)   Date Value   05/28/2022 39     BUN (mg/dL)   Date Value   05/28/2022 16      (goal A1C is < 7)   (goal LDL is <100) need 30-50% reduction from baseline     BP Readings from Last 3 Encounters:   05/25/23 118/88   03/18/23 (!) 145/105   12/22/22 130/84    (goal /80)      All Future Testing planned in [facility]:  Lab Frequency Next Occurrence   Lumbar Epidural Steroid Injection/Caudal Once 03/23/2023       Next Visit Date:  Future Appointments   Date Time Provider Terrence Moreno   6/8/2023  8:00 AM DO Nabeel Tapia [facility]            Patient Active Problem List:     Asymptomatic varicose veins of both lower extremities     Venous stasis dermatitis of both lower extremities     Hemosiderin pigmentation of skin     Insulin resistance     Folliculitis

## 2023-05-25 NOTE — PROGRESS NOTES
HPI Notes    Name: Logan Hamm  : 1985         Chief Complaint:     Chief Complaint   Patient presents with    Otalgia     Patient woke up yesterday with right ear pain with clear drainage. Left ear pain started today. Hx of ear infections. History of Present Illness:        HPI    This is a 40year old man presenting for evaluation of one day's worth of right sided throbbing otalgia, along with some watery to pink discharge. He has not been ill recently, denying fevers, chills, sick contacts, cough, sneezing, rhinorrhea, SOA. He reports he can barely hear out of the right ear and endorses hearing a lot of \"crackling\". The right side of his neck is also sore and tender. He did try to shower yesterday, but bending over or moving his head causes some mild dizziness and a sharper pain in the right ear. Past Medical History:     Past Medical History:   Diagnosis Date    Bipolar 1 disorder ([facility] [address]     Hypertension     Kidney stones     Obesity     Pre-diabetes       Reviewed all health maintenance requirements and ordered appropriate tests  Health Maintenance Due   Topic Date Due    COVID-19 Vaccine (1) Never done    Varicella vaccine (1 of 2 - 2-dose childhood series) Never done    HIV screen  Never done    Hepatitis C screen  Never done    Diabetes screen  Never done       Past Surgical History:     Past Surgical History:   Procedure Laterality Date    DENTAL SURGERY      TESTICLE REMOVAL      TONSILLECTOMY      TYMPANOMASTOIDECTOMY Left 2021    TYMPANOMASTOIDECTOMY performed by Agnes Carmona MD at [address] [facility]          Medications:       Prior to Admission medications    Medication Sig Start Date End Date Taking?  Authorizing Provider   naproxen (NAPROSYN) 500 MG tablet Take 1 tablet by mouth 2 times daily as needed for Pain 23  Yes Bev Dimas DO   ciprofloxacin-dexamethasone ([address])

## 2023-05-26 ENCOUNTER — TELEPHONE (OUTPATIENT)
Dept: FAMILY MEDICINE CLINIC | Age: 38
End: 2023-05-26

## 2023-05-26 DIAGNOSIS — H92.01 ACUTE OTALGIA, RIGHT: Primary | ICD-10-CM

## 2023-05-26 RX ORDER — OFLOXACIN 3 MG/ML
1 SOLUTION/ DROPS OPHTHALMIC 4 TIMES DAILY
Qty: 10 ML | Refills: 0 | Status: SHIPPED | OUTPATIENT
Start: 2023-05-26 | End: 2023-06-05

## 2023-05-26 NOTE — TELEPHONE ENCOUNTER
Prior auth needed for Ciprofloxacin.   Patient notified of medication change      Ofloxacin 0.3% pending

## 2023-05-31 NOTE — PROGRESS NOTES
[facility]   Preadmission Testing    Name: Isreal Talbert  : 1985  Patient Phone: [phone] (home)     Procedure: LUMBAR L5-S1 INTERLAMINAR EPIDURAL STEROID INJECTION  Local  Date of Procedure: 2023  Surgeon: Aileen Khan DO    Ht:  5' 11\" (180.3 cm)  Wt: Weight - Scale: (!) 315 lb (142.9 kg)  Wt method: Stated    Allergies: No Known Allergies             There were no vitals filed for this visit. No LMP for male patient. Do you take blood thinners? [] Yes    [x] No         Instructed to stop blood thinners prior to procedure? [] Yes    [] No      [x] N/A    []Eliquis - 3 days  []Xarelto - 3 days  []Pradaxa - 5 days  []Coumadin - 5 days   []Plavix - 7 days  []ASA 325mg - 7 days  []Brillinta - 5 days  []Pletal - 2 days   Have you had a respiratory infection or sore throat in last 4 weeks before surgery?     [] Yes    [x] No     Patient instructed on: [x] NPO Status - if receiving sedation  [x] Meds to Take  [x] Ride Home - sedation/ epidurals  [x]No Jewelry/Contact Lenses/Nail Polish     DOS Patient Needs [] HCG   [x] Blood Sugar  [] PT/INR

## 2023-06-01 ENCOUNTER — APPOINTMENT (OUTPATIENT)
Dept: GENERAL RADIOLOGY | Age: 38
End: 2023-06-01
Attending: STUDENT IN AN ORGANIZED HEALTH CARE EDUCATION/TRAINING PROGRAM
Payer: COMMERCIAL

## 2023-06-01 ENCOUNTER — HOSPITAL ENCOUNTER (OUTPATIENT)
Age: 38
Setting detail: OUTPATIENT SURGERY
Discharge: HOME | End: 2023-06-01
Attending: STUDENT IN AN ORGANIZED HEALTH CARE EDUCATION/TRAINING PROGRAM | Admitting: STUDENT IN AN ORGANIZED HEALTH CARE EDUCATION/TRAINING PROGRAM
Payer: COMMERCIAL

## 2023-06-01 VITALS
WEIGHT: 315 LBS | SYSTOLIC BLOOD PRESSURE: 146 MMHG | RESPIRATION RATE: 18 BRPM | TEMPERATURE: 97 F | BODY MASS INDEX: 44.1 KG/M2 | HEIGHT: 71 IN | HEART RATE: 65 BPM | OXYGEN SATURATION: 98 % | DIASTOLIC BLOOD PRESSURE: 87 MMHG

## 2023-06-01 LAB — GLUCOSE BLD-MCNC: 103 MG/DL (ref 65–99)

## 2023-06-01 PROCEDURE — 76000 FLUOROSCOPY <1 HR PHYS/QHP: CPT

## 2023-06-01 PROCEDURE — 62323 NJX INTERLAMINAR LMBR/SAC: CPT | Performed by: STUDENT IN AN ORGANIZED HEALTH CARE EDUCATION/TRAINING PROGRAM

## 2023-06-01 PROCEDURE — 2580000003 HC RX 258: Performed by: STUDENT IN AN ORGANIZED HEALTH CARE EDUCATION/TRAINING PROGRAM

## 2023-06-01 PROCEDURE — 7100000010 HC PHASE II RECOVERY - FIRST 15 MIN: Performed by: STUDENT IN AN ORGANIZED HEALTH CARE EDUCATION/TRAINING PROGRAM

## 2023-06-01 PROCEDURE — A4216 STERILE WATER/SALINE, 10 ML: HCPCS | Performed by: STUDENT IN AN ORGANIZED HEALTH CARE EDUCATION/TRAINING PROGRAM

## 2023-06-01 PROCEDURE — 3600000052 HC PAIN LEVEL 2 BASE: Performed by: STUDENT IN AN ORGANIZED HEALTH CARE EDUCATION/TRAINING PROGRAM

## 2023-06-01 PROCEDURE — 2709999900 HC NON-CHARGEABLE SUPPLY: Performed by: STUDENT IN AN ORGANIZED HEALTH CARE EDUCATION/TRAINING PROGRAM

## 2023-06-01 PROCEDURE — 82947 ASSAY GLUCOSE BLOOD QUANT: CPT

## 2023-06-01 PROCEDURE — 6360000002 HC RX W HCPCS: Performed by: STUDENT IN AN ORGANIZED HEALTH CARE EDUCATION/TRAINING PROGRAM

## 2023-06-01 PROCEDURE — 2500000003 HC RX 250 WO HCPCS: Performed by: STUDENT IN AN ORGANIZED HEALTH CARE EDUCATION/TRAINING PROGRAM

## 2023-06-01 PROCEDURE — 6360000004 HC RX CONTRAST MEDICATION: Performed by: STUDENT IN AN ORGANIZED HEALTH CARE EDUCATION/TRAINING PROGRAM

## 2023-06-01 RX ORDER — LIDOCAINE HYDROCHLORIDE 10 MG/ML
INJECTION, SOLUTION EPIDURAL; INFILTRATION; INTRACAUDAL; PERINEURAL PRN
Status: DISCONTINUED | OUTPATIENT
Start: 2023-06-01 | End: 2023-06-01 | Stop reason: ALTCHOICE

## 2023-06-01 RX ORDER — TRIAMCINOLONE ACETONIDE 40 MG/ML
INJECTION, SUSPENSION INTRA-ARTICULAR; INTRAMUSCULAR PRN
Status: DISCONTINUED | OUTPATIENT
Start: 2023-06-01 | End: 2023-06-01 | Stop reason: ALTCHOICE

## 2023-06-01 RX ORDER — SODIUM CHLORIDE 9 MG/ML
INJECTION INTRAVENOUS PRN
Status: DISCONTINUED | OUTPATIENT
Start: 2023-06-01 | End: 2023-06-01 | Stop reason: ALTCHOICE

## 2023-06-01 ASSESSMENT — PAIN - FUNCTIONAL ASSESSMENT: PAIN_FUNCTIONAL_ASSESSMENT: 0-10

## 2023-06-01 NOTE — H&P
Update History & Physical    The patient's History and Physical of March 23, 2023 was reviewed with the patient and I examined the patient. There was no change. The surgical site was confirmed by the patient and me. Plan: The risks, benefits, expected outcome, and alternative to the recommended procedure have been discussed with the patient. Patient understands and wants to proceed with the procedure. Electronically signed by Alban Collet, DO on 6/1/2023 at 11:45 AM    Chronic Pain Clinic Note     Encounter Date: 6/1/2023     SUBJECTIVE:  No chief complaint on file. History of Present Illness:   Ty Goyal is a 40 y.o. male who presents with Lumbar back pain with radiation to the legs. He does a lot of standing at his job. Medication Refill: n/a     Current Complaints of Pain:   Location: lumbar    Radiation: BLE, right worse  Severity: mild- moderate   Pain Numerical Score -  1   Average:  8     Highest: 9-10  Lowest: 7  Character/Quality: Complains of pain that is aching, burning, dull, sharp   Timing: Morning, increases with activity/ standing   Associated symptoms: weakness- both legs intermittently   Numbness: legs  Weakness: legs  Exacerbating factors: activity   Alleviating factors: sitting   Length of time pain has been present: Started on - chronic problem   Inciting event/injury: no  Bowel/Bladder incontinence: no  Falls: no   Physical Therapy: no    History of Interventions:   Surgery: No previous lumbar/cervical surgeries  Injections: None    Imaging:    MRI lumbar 12/21/22    FINDINGS:   BONES/ALIGNMENT: There is normal alignment of the spine. The vertebral body   heights are maintained. The bone marrow signal appears unremarkable. SPINAL CORD: The conus terminates normally. SOFT TISSUES: No paraspinal mass identified. L1-L2: There is no significant disc herniation, spinal canal stenosis or   neural foraminal narrowing.        L2-L3: There is no significant

## 2023-06-01 NOTE — OP NOTE
PROCEDURE PERFORMED: Lumbar Interlaminar Epidural Steroid Injection using Fluoroscopy    PREOPERATIVE DIAGNOSIS: Lumbosacral radiculopathy    INDICATIONS: Radicular leg pain    The patient's history and physical exam were reviewed. The risk, benefits, and alternatives of the procedure were discussed and all questions were answered to the patient's satisfaction. The patient agreed to proceed and written informed consent was obtained. POSTOPERATIVE DIAGNOSIS: Lumbosacral radiculopathy    PHYSICIAN:  Dr. Devin Carrington DO    ANESTHESIA:  LOCAL    ASSISTANT:  NONE    PATHOLOGY:  NONE    ESTIMATED BLOOD LOSS:  N/A    IMPLANTS:  NONE    PROCEDURE DESCRIPTION: Lumbar interlaminar epidural injection using fluoroscopy    The patient was placed on the operative bed in prone position. The area was prepped with  Chlorhexidine. The area was then draped in a sterile fashion. An AP  film was taken to identify the correct level. The overlying skin and subcutaneous tissues were anesthetized using 1% preservative-free lidocaine. Then a 18-gauge 4.5 inch Tuohy needle was advanced under fluoroscopic guidance using AP and lateral views into the interlaminar space at L5-S1. Loss-of-resistance syringe was then attached. Loss-of-resistance was then obtained. Then, after negative aspiration, Omnipaque was injected under AP view fluoroscopy and confirmed adequate spread along the nerve root and in the epidural space. There was no evidence of intravascular uptake or intrathecal spread on imaging. A lateral view was also taken confirming adequate epidural spread. At this point, after negative aspiration, a total volume of treatment injectate consisting of 2 mL of 40 mg/mL triamcinolone and 5 mL of preservative-free normal saline was injected easily. The needle was then flushed and removed. The needle insertion site was dressed appropriately.     The patient was transferred to the postoperative care unit in stable

## 2023-06-21 ENCOUNTER — APPOINTMENT (OUTPATIENT)
Dept: CT IMAGING | Age: 38
End: 2023-06-21
Payer: COMMERCIAL

## 2023-06-21 ENCOUNTER — HOSPITAL ENCOUNTER (EMERGENCY)
Age: 38
Discharge: HOME | End: 2023-06-21
Attending: EMERGENCY MEDICINE
Payer: COMMERCIAL

## 2023-06-21 VITALS
WEIGHT: 315 LBS | RESPIRATION RATE: 18 BRPM | HEIGHT: 71 IN | SYSTOLIC BLOOD PRESSURE: 133 MMHG | DIASTOLIC BLOOD PRESSURE: 87 MMHG | TEMPERATURE: 98.4 F | HEART RATE: 91 BPM | OXYGEN SATURATION: 96 % | BODY MASS INDEX: 44.1 KG/M2

## 2023-06-21 DIAGNOSIS — S39.012A BACK STRAIN, INITIAL ENCOUNTER: Primary | ICD-10-CM

## 2023-06-21 PROCEDURE — 99284 EMERGENCY DEPT VISIT MOD MDM: CPT

## 2023-06-21 PROCEDURE — 6360000002 HC RX W HCPCS: Performed by: EMERGENCY MEDICINE

## 2023-06-21 PROCEDURE — 6370000000 HC RX 637 (ALT 250 FOR IP): Performed by: EMERGENCY MEDICINE

## 2023-06-21 PROCEDURE — 72131 CT LUMBAR SPINE W/O DYE: CPT

## 2023-06-21 PROCEDURE — 96372 THER/PROPH/DIAG INJ SC/IM: CPT

## 2023-06-21 RX ORDER — NEOMYCIN SULFATE, POLYMYXIN B SULFATE, HYDROCORTISONE 3.5; 10000; 1 MG/ML; [USP'U]/ML; MG/ML
4 SOLUTION/ DROPS AURICULAR (OTIC) 2 TIMES DAILY
Qty: 1 EACH | Refills: 0 | COMMUNITY
Start: 2023-06-13 | End: 2023-06-23

## 2023-06-21 RX ORDER — CYCLOBENZAPRINE HCL 10 MG
10 TABLET ORAL 2 TIMES DAILY PRN
Qty: 15 TABLET | Refills: 0 | Status: SHIPPED | OUTPATIENT
Start: 2023-06-21 | End: 2023-06-28

## 2023-06-21 RX ORDER — KETOROLAC TROMETHAMINE 30 MG/ML
60 INJECTION, SOLUTION INTRAMUSCULAR; INTRAVENOUS ONCE
Status: COMPLETED | OUTPATIENT
Start: 2023-06-21 | End: 2023-06-21

## 2023-06-21 RX ORDER — AMOXICILLIN AND CLAVULANATE POTASSIUM 875; 125 MG/1; MG/1
1 TABLET, FILM COATED ORAL 2 TIMES DAILY
Qty: 20 TABLET | Refills: 0 | COMMUNITY
Start: 2023-06-13 | End: 2023-06-23

## 2023-06-21 RX ORDER — OXYCODONE HYDROCHLORIDE AND ACETAMINOPHEN 5; 325 MG/1; MG/1
1 TABLET ORAL ONCE
Status: COMPLETED | OUTPATIENT
Start: 2023-06-21 | End: 2023-06-21

## 2023-06-21 RX ORDER — DICLOFENAC SODIUM 75 MG/1
75 TABLET, DELAYED RELEASE ORAL 2 TIMES DAILY PRN
Qty: 20 TABLET | Refills: 0 | Status: SHIPPED | OUTPATIENT
Start: 2023-06-21 | End: 2023-06-24 | Stop reason: SDUPTHER

## 2023-06-21 RX ORDER — ORPHENADRINE CITRATE 30 MG/ML
60 INJECTION INTRAMUSCULAR; INTRAVENOUS ONCE
Status: DISCONTINUED | OUTPATIENT
Start: 2023-06-21 | End: 2023-06-21 | Stop reason: HOSPADM

## 2023-06-21 RX ADMIN — KETOROLAC TROMETHAMINE 60 MG: 30 INJECTION, SOLUTION INTRAMUSCULAR at 20:11

## 2023-06-21 RX ADMIN — OXYCODONE HYDROCHLORIDE AND ACETAMINOPHEN 1 TABLET: 5; 325 TABLET ORAL at 20:10

## 2023-06-21 ASSESSMENT — PAIN DESCRIPTION - LOCATION
LOCATION: BACK

## 2023-06-21 ASSESSMENT — PAIN DESCRIPTION - DESCRIPTORS
DESCRIPTORS: SHARP;SHOOTING
DESCRIPTORS: THROBBING
DESCRIPTORS: STABBING;SHOOTING

## 2023-06-21 ASSESSMENT — PAIN SCALES - GENERAL
PAINLEVEL_OUTOF10: 10
PAINLEVEL_OUTOF10: 5
PAINLEVEL_OUTOF10: 10

## 2023-06-21 ASSESSMENT — PAIN DESCRIPTION - ORIENTATION
ORIENTATION: LOWER;MID
ORIENTATION: MID;LOWER
ORIENTATION: LOWER

## 2023-06-21 ASSESSMENT — LIFESTYLE VARIABLES
HOW OFTEN DO YOU HAVE A DRINK CONTAINING ALCOHOL: NEVER
HOW MANY STANDARD DRINKS CONTAINING ALCOHOL DO YOU HAVE ON A TYPICAL DAY: PATIENT DOES NOT DRINK

## 2023-06-21 ASSESSMENT — PAIN - FUNCTIONAL ASSESSMENT: PAIN_FUNCTIONAL_ASSESSMENT: 0-10

## 2023-06-21 ASSESSMENT — PAIN DESCRIPTION - PAIN TYPE: TYPE: ACUTE PAIN

## 2023-06-21 NOTE — ED PROVIDER NOTES
[address]      Pt Name: Ty Goyal  MRN: [id number]  [place] 1985  Date of evaluation: 6/21/2023  Provider: Anaya Alicea MD    CHIEF COMPLAINT       Chief Complaint   Patient presents with    Back Pain     Pt. has degenerative disk disease and 3mm bulge lower back. Epidermal given June 1st and pain has gotten worse. HISTORY OF PRESENT ILLNESS      Ty Goyal is a 40 y.o. male who presents to the emergency department the patient presenting to us with a right-sided back pain that radiating down to his legs , with no weakness numbness or tingling the patient had a recent epidural injection almost 21 days ago after which he think the pain got worse    No history of trauma or any other concerns        REVIEW OF SYSTEMS       Review of Systems   All other systems reviewed and are negative.       PAST MEDICAL HISTORY     Past Medical History:   Diagnosis Date    Bipolar 1 disorder ([address]     Hypertension     Kidney stones     Obesity     Pre-diabetes          SURGICAL HISTORY       Past Surgical History:   Procedure Laterality Date    DENTAL SURGERY      PAIN MANAGEMENT PROCEDURE N/A 6/1/2023    LUMBAR L5-S1 INTERLAMINAR EPIDURAL STEROID INJECTION performed by Alban Collet, DO at [facility] 5/4/2021    TYMPANOMASTOIDECTOMY performed by Ray Ledbetter MD at [place] [address]       Discharge Medication List as of 6/21/2023  9:41 PM        CONTINUE these medications which have NOT CHANGED    Details   amoxicillin-clavulanate (AUGMENTIN) 875-125 MG per tablet Take 1 tablet by mouth 2 times daily, Disp-20 tablet, R-0Historical Med      neomycin-polymyxin-hydrocortisone 1 % SOLN otic solution Place 4 drops in ear(s) 2 times daily, Disp-1 each, R-0Historical Med

## 2023-06-24 ENCOUNTER — HOSPITAL ENCOUNTER (EMERGENCY)
Age: 38
Discharge: HOME | End: 2023-06-24
Attending: EMERGENCY MEDICINE
Payer: COMMERCIAL

## 2023-06-24 VITALS
DIASTOLIC BLOOD PRESSURE: 87 MMHG | BODY MASS INDEX: 44.1 KG/M2 | OXYGEN SATURATION: 94 % | HEIGHT: 71 IN | RESPIRATION RATE: 18 BRPM | HEART RATE: 95 BPM | TEMPERATURE: 98.4 F | SYSTOLIC BLOOD PRESSURE: 155 MMHG | WEIGHT: 315 LBS

## 2023-06-24 DIAGNOSIS — M54.50 CHRONIC RIGHT-SIDED LOW BACK PAIN WITHOUT SCIATICA: ICD-10-CM

## 2023-06-24 DIAGNOSIS — G89.29 CHRONIC RIGHT-SIDED LOW BACK PAIN WITHOUT SCIATICA: ICD-10-CM

## 2023-06-24 DIAGNOSIS — S39.012A STRAIN OF LUMBAR REGION, INITIAL ENCOUNTER: Primary | ICD-10-CM

## 2023-06-24 PROCEDURE — 6360000002 HC RX W HCPCS: Performed by: EMERGENCY MEDICINE

## 2023-06-24 PROCEDURE — 99284 EMERGENCY DEPT VISIT MOD MDM: CPT

## 2023-06-24 PROCEDURE — 96372 THER/PROPH/DIAG INJ SC/IM: CPT

## 2023-06-24 PROCEDURE — 6370000000 HC RX 637 (ALT 250 FOR IP): Performed by: EMERGENCY MEDICINE

## 2023-06-24 RX ORDER — KETOROLAC TROMETHAMINE 10 MG/1
10 TABLET, FILM COATED ORAL EVERY 6 HOURS PRN
Qty: 10 TABLET | Refills: 0 | Status: SHIPPED | OUTPATIENT
Start: 2023-06-24

## 2023-06-24 RX ORDER — OXYCODONE HYDROCHLORIDE AND ACETAMINOPHEN 5; 325 MG/1; MG/1
1 TABLET ORAL ONCE
Status: COMPLETED | OUTPATIENT
Start: 2023-06-24 | End: 2023-06-24

## 2023-06-24 RX ORDER — PREDNISONE 20 MG/1
40 TABLET ORAL DAILY
Qty: 10 TABLET | Refills: 0 | Status: SHIPPED | OUTPATIENT
Start: 2023-06-24 | End: 2023-06-29

## 2023-06-24 RX ORDER — KETOROLAC TROMETHAMINE 30 MG/ML
60 INJECTION, SOLUTION INTRAMUSCULAR; INTRAVENOUS ONCE
Status: COMPLETED | OUTPATIENT
Start: 2023-06-24 | End: 2023-06-24

## 2023-06-24 RX ORDER — FAMOTIDINE 20 MG/1
20 TABLET, FILM COATED ORAL 2 TIMES DAILY
Qty: 20 TABLET | Refills: 0 | Status: SHIPPED | OUTPATIENT
Start: 2023-06-24 | End: 2023-07-04

## 2023-06-24 RX ADMIN — KETOROLAC TROMETHAMINE 60 MG: 30 INJECTION, SOLUTION INTRAMUSCULAR at 20:41

## 2023-06-24 RX ADMIN — OXYCODONE HYDROCHLORIDE AND ACETAMINOPHEN 1 TABLET: 5; 325 TABLET ORAL at 20:41

## 2023-06-24 ASSESSMENT — PAIN DESCRIPTION - PAIN TYPE: TYPE: ACUTE PAIN

## 2023-06-24 ASSESSMENT — PAIN DESCRIPTION - DESCRIPTORS: DESCRIPTORS: STABBING

## 2023-06-24 ASSESSMENT — PAIN DESCRIPTION - LOCATION: LOCATION: BACK

## 2023-06-24 ASSESSMENT — PAIN - FUNCTIONAL ASSESSMENT: PAIN_FUNCTIONAL_ASSESSMENT: 0-10

## 2023-06-24 ASSESSMENT — PAIN SCALES - GENERAL: PAINLEVEL_OUTOF10: 10

## 2023-06-24 ASSESSMENT — PAIN DESCRIPTION - ORIENTATION: ORIENTATION: LOWER;MID

## 2023-08-09 ENCOUNTER — OFFICE VISIT (OUTPATIENT)
Age: 38
End: 2023-08-09
Payer: COMMERCIAL

## 2023-08-09 VITALS — BODY MASS INDEX: 44.1 KG/M2 | HEIGHT: 71 IN | WEIGHT: 315 LBS | RESPIRATION RATE: 18 BRPM

## 2023-08-09 DIAGNOSIS — M51.36 LUMBAR DEGENERATIVE DISC DISEASE: ICD-10-CM

## 2023-08-09 DIAGNOSIS — E66.01 CLASS 3 SEVERE OBESITY WITH BODY MASS INDEX (BMI) OF 40.0 TO 44.9 IN ADULT, UNSPECIFIED OBESITY TYPE, UNSPECIFIED WHETHER SERIOUS COMORBIDITY PRESENT (HCC): ICD-10-CM

## 2023-08-09 DIAGNOSIS — M54.16 LUMBAR RADICULOPATHY: Primary | ICD-10-CM

## 2023-08-09 DIAGNOSIS — M47.817 LUMBOSACRAL SPONDYLOSIS WITHOUT MYELOPATHY: ICD-10-CM

## 2023-08-09 PROCEDURE — G8417 CALC BMI ABV UP PARAM F/U: HCPCS | Performed by: STUDENT IN AN ORGANIZED HEALTH CARE EDUCATION/TRAINING PROGRAM

## 2023-08-09 PROCEDURE — G8427 DOCREV CUR MEDS BY ELIG CLIN: HCPCS | Performed by: STUDENT IN AN ORGANIZED HEALTH CARE EDUCATION/TRAINING PROGRAM

## 2023-08-09 PROCEDURE — 4004F PT TOBACCO SCREEN RCVD TLK: CPT | Performed by: STUDENT IN AN ORGANIZED HEALTH CARE EDUCATION/TRAINING PROGRAM

## 2023-08-09 PROCEDURE — 99214 OFFICE O/P EST MOD 30 MIN: CPT | Performed by: STUDENT IN AN ORGANIZED HEALTH CARE EDUCATION/TRAINING PROGRAM

## 2023-08-09 RX ORDER — CELECOXIB 100 MG/1
100 CAPSULE ORAL DAILY
Qty: 60 CAPSULE | Refills: 3 | Status: SHIPPED | OUTPATIENT
Start: 2023-08-09

## 2023-08-09 NOTE — PROGRESS NOTES
appearance. Palpation: There is tenderness to palpation along the lumbar paraspinal musculature bilaterally  Lumbar range of motion is full  NEUROMUSCULAR:  Patient ambulates unassisted  Gait is nonantalgic  Sensation to light touch is grossly intact in lower extremities  Strength is grossly intact in lower extremities  No ankle clonus    Special Tests:  Lumbar facet loading is positive bilaterally  Seated straight leg raise is positive bilaterally      DIAGNOSIS:    ICD-10-CM    1. Lumbar radiculopathy  M54.16 celecoxib (CELEBREX) 100 MG capsule     External Referral To Neurosurgery      2. Lumbar degenerative disc disease  M51.36       3. Lumbosacral spondylosis without myelopathy  M47.817       4. Class 3 severe obesity with body mass index (BMI) of 40.0 to 44.9 in adult, unspecified obesity type, unspecified whether serious comorbidity present [facility])  E66.01     Z68.41         ASSESSMENT:    Elio Barcenas is a 40 y.o.male who presents with chronic low back pain and bilateral leg pain. To review, patient has had symptoms for over 1 year. He denies low back surgeries. The patient's history and physical examination are consistent with lumbar radiculopathy as the patient has pain starting in the low back radiating down into the bilateral legs. Patient has positive neural tension signs on examination with a positive seated straight leg raise. Additionally the lumbar MRI on 12/21/2022 reveals multilevel degenerative changes with L5-S1 grade 1 retrolisthesis, central disc protrusion and moderate bilateral neuroforaminal narrowing. That protruded disc contacts bilateral descending S1 nerve roots. He underwent a  lumbar L5-S1 interlaminar epidural steroid injection on 6/1/2023 with minimal improvement in pain and function. I will refer him to Neurosurgery and start him on celebrex 100 mg twice daily due to minimal improvement in symptoms.     Neurologically, it appears the patient has full strength and

## 2023-12-04 ENCOUNTER — HOSPITAL ENCOUNTER (EMERGENCY)
Age: 38
Discharge: HOME | End: 2023-12-04
Attending: EMERGENCY MEDICINE
Payer: COMMERCIAL

## 2023-12-04 VITALS
SYSTOLIC BLOOD PRESSURE: 150 MMHG | HEIGHT: 71 IN | TEMPERATURE: 97.8 F | RESPIRATION RATE: 20 BRPM | DIASTOLIC BLOOD PRESSURE: 100 MMHG | BODY MASS INDEX: 44.1 KG/M2 | HEART RATE: 90 BPM | OXYGEN SATURATION: 99 % | WEIGHT: 315 LBS

## 2023-12-04 DIAGNOSIS — M54.40 LUMBAGO OF LUMBAR REGION WITH SCIATICA: Primary | ICD-10-CM

## 2023-12-04 PROCEDURE — 6360000002 HC RX W HCPCS: Performed by: EMERGENCY MEDICINE

## 2023-12-04 PROCEDURE — 99284 EMERGENCY DEPT VISIT MOD MDM: CPT

## 2023-12-04 PROCEDURE — 96372 THER/PROPH/DIAG INJ SC/IM: CPT

## 2023-12-04 RX ORDER — KETOROLAC TROMETHAMINE 30 MG/ML
30 INJECTION, SOLUTION INTRAMUSCULAR; INTRAVENOUS ONCE
Status: COMPLETED | OUTPATIENT
Start: 2023-12-04 | End: 2023-12-04

## 2023-12-04 RX ORDER — TIZANIDINE 4 MG/1
4 TABLET ORAL NIGHTLY PRN
Qty: 30 TABLET | Refills: 0 | Status: SHIPPED | OUTPATIENT
Start: 2023-12-04

## 2023-12-04 RX ORDER — PREDNISONE 20 MG/1
TABLET ORAL
Qty: 18 TABLET | Refills: 0 | Status: SHIPPED | OUTPATIENT
Start: 2023-12-04

## 2023-12-04 RX ORDER — ORPHENADRINE CITRATE 30 MG/ML
60 INJECTION INTRAMUSCULAR; INTRAVENOUS ONCE
Status: COMPLETED | OUTPATIENT
Start: 2023-12-04 | End: 2023-12-04

## 2023-12-04 RX ADMIN — KETOROLAC TROMETHAMINE 30 MG: 30 INJECTION, SOLUTION INTRAMUSCULAR at 21:58

## 2023-12-04 RX ADMIN — ORPHENADRINE CITRATE 60 MG: 60 INJECTION INTRAMUSCULAR; INTRAVENOUS at 21:58

## 2023-12-04 ASSESSMENT — PAIN DESCRIPTION - ORIENTATION: ORIENTATION: LOWER

## 2023-12-04 ASSESSMENT — PAIN DESCRIPTION - PAIN TYPE: TYPE: CHRONIC PAIN

## 2023-12-04 ASSESSMENT — PAIN SCALES - GENERAL: PAINLEVEL_OUTOF10: 10

## 2023-12-04 ASSESSMENT — PAIN DESCRIPTION - LOCATION: LOCATION: BACK

## 2023-12-04 ASSESSMENT — PAIN DESCRIPTION - DESCRIPTORS: DESCRIPTORS: SHOOTING;SHARP

## 2023-12-04 ASSESSMENT — PAIN - FUNCTIONAL ASSESSMENT: PAIN_FUNCTIONAL_ASSESSMENT: 0-10

## 2023-12-04 ASSESSMENT — PAIN DESCRIPTION - FREQUENCY: FREQUENCY: INTERMITTENT

## 2023-12-05 NOTE — ED PROVIDER NOTES
visit:    Medications   ketorolac (TORADOL) injection 30 mg (has no administration in time range)   orphenadrine (NORFLEX) injection 60 mg (has no administration in time range)       New Prescriptions from this visit:    New Prescriptions    PREDNISONE (DELTASONE) 20 MG TABLET    3 tablets daily x 3 days then 2 tablets daily x 3 days then 1 tablet daily x 3    TIZANIDINE (ZANAFLEX) 4 MG TABLET    Take 1 tablet by mouth nightly as needed (For low back muscle spasm)       Follow-up:  Neurosurgery              Final Impression:   1.  Lumbago of lumbar region with sciatica                   (Please note that portions of this note were completed with a voice recognition program.  Efforts were made to edit the dictations but occasionally words are mis-transcribed.)        Ronak Martinez MD  12/04/23 2035

## 2023-12-07 ENCOUNTER — TELEPHONE (OUTPATIENT)
Dept: FAMILY MEDICINE CLINIC | Age: 38
End: 2023-12-07

## 2023-12-07 NOTE — TELEPHONE ENCOUNTER
Last OV: 5/25/2023          Pt was in ER 12//04/2023 for back pain , was wrote off work until 12/06/23. Pain is still severe and needs off work note for 12/06 and 12/07 to return 12/08 please?  Nurse can call with questions :)    Thank you

## 2024-02-07 ENCOUNTER — HOSPITAL ENCOUNTER (EMERGENCY)
Age: 39
Discharge: HOME | End: 2024-02-08
Attending: FAMILY MEDICINE
Payer: COMMERCIAL

## 2024-02-07 VITALS
SYSTOLIC BLOOD PRESSURE: 162 MMHG | WEIGHT: 315 LBS | DIASTOLIC BLOOD PRESSURE: 96 MMHG | BODY MASS INDEX: 44.1 KG/M2 | TEMPERATURE: 98.5 F | HEART RATE: 88 BPM | HEIGHT: 71 IN | RESPIRATION RATE: 18 BRPM | OXYGEN SATURATION: 98 %

## 2024-02-07 DIAGNOSIS — S46.212A BICEPS TENDON RUPTURE, LEFT, INITIAL ENCOUNTER: Primary | ICD-10-CM

## 2024-02-07 PROCEDURE — 99283 EMERGENCY DEPT VISIT LOW MDM: CPT

## 2024-02-07 RX ORDER — TRAMADOL HYDROCHLORIDE 50 MG/1
50 TABLET ORAL EVERY 4 HOURS PRN
COMMUNITY
Start: 2024-01-24 | End: 2024-03-24

## 2024-02-07 ASSESSMENT — PAIN SCALES - GENERAL: PAINLEVEL_OUTOF10: 9

## 2024-02-07 ASSESSMENT — PAIN DESCRIPTION - ORIENTATION: ORIENTATION: LEFT

## 2024-02-07 ASSESSMENT — PAIN DESCRIPTION - LOCATION: LOCATION: ELBOW

## 2024-02-07 ASSESSMENT — PAIN DESCRIPTION - DESCRIPTORS: DESCRIPTORS: THROBBING;SHOOTING;STABBING

## 2024-02-07 ASSESSMENT — LIFESTYLE VARIABLES
HOW MANY STANDARD DRINKS CONTAINING ALCOHOL DO YOU HAVE ON A TYPICAL DAY: PATIENT DOES NOT DRINK
HOW OFTEN DO YOU HAVE A DRINK CONTAINING ALCOHOL: NEVER

## 2024-02-07 ASSESSMENT — PAIN DESCRIPTION - PAIN TYPE: TYPE: ACUTE PAIN

## 2024-02-07 ASSESSMENT — PAIN - FUNCTIONAL ASSESSMENT: PAIN_FUNCTIONAL_ASSESSMENT: 0-10

## 2024-02-08 ENCOUNTER — APPOINTMENT (OUTPATIENT)
Dept: GENERAL RADIOLOGY | Age: 39
End: 2024-02-08
Payer: COMMERCIAL

## 2024-02-08 PROCEDURE — 73080 X-RAY EXAM OF ELBOW: CPT

## 2024-02-08 RX ORDER — IBUPROFEN 800 MG/1
800 TABLET ORAL EVERY 8 HOURS PRN
Qty: 30 TABLET | Refills: 3 | Status: SHIPPED | OUTPATIENT
Start: 2024-02-08

## 2024-02-08 NOTE — ED PROVIDER NOTES
[facility]  EMERGENCY DEPARTMENT ENCOUNTER      Pt Name: Willis Evangelista  MRN: [id number]  Birthdate 1985  Date of evaluation: 2/7/2024  Provider: Willis Beavers MD    CHIEF COMPLAINT       Chief Complaint   Patient presents with    Elbow Pain     Patient presents to the ER tonight d/t elbow pain following attempting to catch his dog when it got loose earlier          HISTORY OF PRESENT ILLNESS      Willis Evangelista is a 38 y.o. male who presents to the emergency department via private vehicle with significant other, patient plaint of left elbow pain, indicating the volar aspect of the general elbow area, patient was at home when his 100 pound boxer dog ran away, patient tried to grab the lease with the arm somewhat extended, immediately had pain in the elbow area.  Denies any actual fall or other injury.  Pain worse with movement, noting supination and some extension.        REVIEW OF SYSTEMS       Review of Systems   All other systems reviewed and are negative.        PAST MEDICAL HISTORY     Past Medical History:   Diagnosis Date    Bipolar 1 disorder (H[facility])     Hypertension     Kidney stones     Obesity     Pre-diabetes          SURGICAL HISTORY       Past Surgical History:   Procedure Laterality Date    DENTAL SURGERY      PAIN MANAGEMENT PROCEDURE N/A 6/1/2023    LUMBAR L5-S1 INTERLAMINAR EPIDURAL STEROID INJECTION performed by Jeremias Amaral DO at [facility] OR    TESTICLE REMOVAL      TONSILLECTOMY      TYMPANOMASTOIDECTOMY Left 5/4/2021    TYMPANOMASTOIDECTOMY performed by Moises Redd MD at [facility] OR    TYMPANOSTOMY TUBE PLACEMENT      TYMPANOSTOMY TUBE PLACEMENT      UMBILICAL HERNIA REPAIR           CURRENT MEDICATIONS       Discharge Medication List as of 2/8/2024 12:21 AM        CONTINUE these medications which have NOT CHANGED    Details   traMADol (ULTRAM) 50 MG tablet Take 1 tablet by mouth every 4 hours as needed.Historical Med             ALLERGIES       Patient has no known

## 2024-02-08 NOTE — DISCHARGE INSTRUCTIONS
Heat versus ice over affected area for comfort, being careful not to burn the skin, Motrin/Tylenol pain control, would advise close follow-up with orthopedic surgery as soon as possible for evaluation.

## 2024-02-19 ENCOUNTER — HOSPITAL ENCOUNTER (OUTPATIENT)
Dept: MRI IMAGING | Age: 39
Discharge: HOME | End: 2024-02-21
Payer: COMMERCIAL

## 2024-02-19 DIAGNOSIS — M25.522 LEFT ELBOW PAIN: ICD-10-CM

## 2024-02-19 PROCEDURE — 73221 MRI JOINT UPR EXTREM W/O DYE: CPT

## 2024-02-27 ENCOUNTER — ANESTHESIA EVENT (OUTPATIENT)
Dept: OPERATING ROOM | Age: 39
End: 2024-02-27
Payer: COMMERCIAL

## 2024-02-29 ENCOUNTER — HOSPITAL ENCOUNTER (OUTPATIENT)
Age: 39
Discharge: HOME | End: 2024-02-29
Payer: COMMERCIAL

## 2024-02-29 ENCOUNTER — HOSPITAL ENCOUNTER (OUTPATIENT)
Age: 39
End: 2024-02-29
Payer: COMMERCIAL

## 2024-02-29 ENCOUNTER — HOSPITAL ENCOUNTER (OUTPATIENT)
Dept: GENERAL RADIOLOGY | Age: 39
Discharge: HOME | End: 2024-03-02
Payer: COMMERCIAL

## 2024-02-29 ENCOUNTER — HOSPITAL ENCOUNTER (OUTPATIENT)
Age: 39
Discharge: HOME | End: 2024-03-02
Payer: COMMERCIAL

## 2024-02-29 DIAGNOSIS — Z01.811 PRE-OP CHEST EXAM: ICD-10-CM

## 2024-02-29 DIAGNOSIS — S46.212A TEAR OF BICEPS MUSCLE, LEFT, INITIAL ENCOUNTER: ICD-10-CM

## 2024-02-29 LAB
ANION GAP SERPL CALCULATED.3IONS-SCNC: 10 MMOL/L (ref 9–17)
BUN SERPL-MCNC: 14 MG/DL (ref 6–20)
BUN/CREAT SERPL: 18 (ref 9–20)
CALCIUM SERPL-MCNC: 9.3 MG/DL (ref 8.6–10.4)
CHLORIDE SERPL-SCNC: 99 MMOL/L (ref 98–107)
CO2 SERPL-SCNC: 25 MMOL/L (ref 20–31)
CREAT SERPL-MCNC: 0.8 MG/DL (ref 0.7–1.2)
ERYTHROCYTE [DISTWIDTH] IN BLOOD BY AUTOMATED COUNT: 12.4 % (ref 12.1–15.2)
GFR SERPL CREATININE-BSD FRML MDRD: >60 ML/MIN/1.73M2
GLUCOSE SERPL-MCNC: 92 MG/DL (ref 70–99)
HCT VFR BLD AUTO: 45.7 % (ref 41–53)
HGB BLD-MCNC: 15.8 G/DL (ref 13.5–17.5)
MCH RBC QN AUTO: 31.9 PG (ref 26–34)
MCHC RBC AUTO-ENTMCNC: 34.6 G/DL (ref 31–37)
MCV RBC AUTO: 92.3 FL (ref 80–100)
PLATELET # BLD AUTO: 207 K/UL (ref 140–450)
POTASSIUM SERPL-SCNC: 4.3 MMOL/L (ref 3.7–5.3)
RBC # BLD AUTO: 4.95 M/UL (ref 4.5–5.9)
SODIUM SERPL-SCNC: 134 MMOL/L (ref 135–144)
WBC OTHER # BLD: 10.1 K/UL (ref 3.5–11)

## 2024-02-29 PROCEDURE — 80048 BASIC METABOLIC PNL TOTAL CA: CPT

## 2024-02-29 PROCEDURE — 87641 MR-STAPH DNA AMP PROBE: CPT

## 2024-02-29 PROCEDURE — 36415 COLL VENOUS BLD VENIPUNCTURE: CPT

## 2024-02-29 PROCEDURE — 93005 ELECTROCARDIOGRAM TRACING: CPT | Performed by: ORTHOPAEDIC SURGERY

## 2024-02-29 PROCEDURE — 85027 COMPLETE CBC AUTOMATED: CPT

## 2024-02-29 PROCEDURE — 71046 X-RAY EXAM CHEST 2 VIEWS: CPT

## 2024-03-01 LAB
EKG ATRIAL RATE: 69 BPM
EKG P AXIS: 59 DEGREES
EKG P-R INTERVAL: 160 MS
EKG Q-T INTERVAL: 406 MS
EKG QRS DURATION: 88 MS
EKG QTC CALCULATION (BAZETT): 435 MS
EKG R AXIS: 19 DEGREES
EKG T AXIS: 70 DEGREES
EKG VENTRICULAR RATE: 69 BPM
MRSA, DNA, NASAL: NEGATIVE
SPECIMEN DESCRIPTION: NORMAL

## 2024-03-04 ENCOUNTER — ANESTHESIA (OUTPATIENT)
Dept: OPERATING ROOM | Age: 39
End: 2024-03-04
Payer: COMMERCIAL

## 2024-03-04 ENCOUNTER — HOSPITAL ENCOUNTER (OUTPATIENT)
Age: 39
Setting detail: OUTPATIENT SURGERY
Discharge: HOME | End: 2024-03-04
Attending: STUDENT IN AN ORGANIZED HEALTH CARE EDUCATION/TRAINING PROGRAM | Admitting: STUDENT IN AN ORGANIZED HEALTH CARE EDUCATION/TRAINING PROGRAM
Payer: COMMERCIAL

## 2024-03-04 ENCOUNTER — APPOINTMENT (OUTPATIENT)
Dept: GENERAL RADIOLOGY | Age: 39
End: 2024-03-04
Attending: STUDENT IN AN ORGANIZED HEALTH CARE EDUCATION/TRAINING PROGRAM
Payer: COMMERCIAL

## 2024-03-04 VITALS
BODY MASS INDEX: 52.48 KG/M2 | SYSTOLIC BLOOD PRESSURE: 114 MMHG | TEMPERATURE: 97 F | WEIGHT: 315 LBS | HEART RATE: 74 BPM | DIASTOLIC BLOOD PRESSURE: 75 MMHG | RESPIRATION RATE: 10 BRPM | OXYGEN SATURATION: 94 % | HEIGHT: 65 IN

## 2024-03-04 PROCEDURE — 2709999900 HC NON-CHARGEABLE SUPPLY: Performed by: STUDENT IN AN ORGANIZED HEALTH CARE EDUCATION/TRAINING PROGRAM

## 2024-03-04 PROCEDURE — 64415 NJX AA&/STRD BRCH PLXS IMG: CPT | Performed by: NURSE ANESTHETIST, CERTIFIED REGISTERED

## 2024-03-04 PROCEDURE — 2580000003 HC RX 258: Performed by: STUDENT IN AN ORGANIZED HEALTH CARE EDUCATION/TRAINING PROGRAM

## 2024-03-04 PROCEDURE — C1713 ANCHOR/SCREW BN/BN,TIS/BN: HCPCS | Performed by: STUDENT IN AN ORGANIZED HEALTH CARE EDUCATION/TRAINING PROGRAM

## 2024-03-04 PROCEDURE — 7100000011 HC PHASE II RECOVERY - ADDTL 15 MIN: Performed by: STUDENT IN AN ORGANIZED HEALTH CARE EDUCATION/TRAINING PROGRAM

## 2024-03-04 PROCEDURE — 2500000003 HC RX 250 WO HCPCS: Performed by: NURSE ANESTHETIST, CERTIFIED REGISTERED

## 2024-03-04 PROCEDURE — 7100000010 HC PHASE II RECOVERY - FIRST 15 MIN: Performed by: STUDENT IN AN ORGANIZED HEALTH CARE EDUCATION/TRAINING PROGRAM

## 2024-03-04 PROCEDURE — 3700000001 HC ADD 15 MINUTES (ANESTHESIA): Performed by: STUDENT IN AN ORGANIZED HEALTH CARE EDUCATION/TRAINING PROGRAM

## 2024-03-04 PROCEDURE — 3700000000 HC ANESTHESIA ATTENDED CARE: Performed by: STUDENT IN AN ORGANIZED HEALTH CARE EDUCATION/TRAINING PROGRAM

## 2024-03-04 PROCEDURE — 6360000002 HC RX W HCPCS: Performed by: STUDENT IN AN ORGANIZED HEALTH CARE EDUCATION/TRAINING PROGRAM

## 2024-03-04 PROCEDURE — 6360000002 HC RX W HCPCS: Performed by: NURSE ANESTHETIST, CERTIFIED REGISTERED

## 2024-03-04 PROCEDURE — 3600000004 HC SURGERY LEVEL 4 BASE: Performed by: STUDENT IN AN ORGANIZED HEALTH CARE EDUCATION/TRAINING PROGRAM

## 2024-03-04 PROCEDURE — 2720000010 HC SURG SUPPLY STERILE: Performed by: STUDENT IN AN ORGANIZED HEALTH CARE EDUCATION/TRAINING PROGRAM

## 2024-03-04 PROCEDURE — 3600000014 HC SURGERY LEVEL 4 ADDTL 15MIN: Performed by: STUDENT IN AN ORGANIZED HEALTH CARE EDUCATION/TRAINING PROGRAM

## 2024-03-04 DEVICE — KIT IMPL DST BICEPS BTTN INSRT W/ NO2 FIBERLOOP SUT: Type: IMPLANTABLE DEVICE | Site: ARM | Status: FUNCTIONAL

## 2024-03-04 RX ORDER — VANCOMYCIN HYDROCHLORIDE 1 G/20ML
INJECTION, POWDER, LYOPHILIZED, FOR SOLUTION INTRAVENOUS PRN
Status: DISCONTINUED | OUTPATIENT
Start: 2024-03-04 | End: 2024-03-04 | Stop reason: ALTCHOICE

## 2024-03-04 RX ORDER — PROPOFOL 10 MG/ML
INJECTION, EMULSION INTRAVENOUS PRN
Status: DISCONTINUED | OUTPATIENT
Start: 2024-03-04 | End: 2024-03-04 | Stop reason: SDUPTHER

## 2024-03-04 RX ORDER — SODIUM CHLORIDE 9 MG/ML
INJECTION, SOLUTION INTRAVENOUS PRN
Status: DISCONTINUED | OUTPATIENT
Start: 2024-03-04 | End: 2024-03-04 | Stop reason: HOSPADM

## 2024-03-04 RX ORDER — ONDANSETRON 2 MG/ML
INJECTION INTRAMUSCULAR; INTRAVENOUS PRN
Status: DISCONTINUED | OUTPATIENT
Start: 2024-03-04 | End: 2024-03-04 | Stop reason: SDUPTHER

## 2024-03-04 RX ORDER — SODIUM CHLORIDE 9 MG/ML
INJECTION, SOLUTION INTRAVENOUS CONTINUOUS
Status: DISCONTINUED | OUTPATIENT
Start: 2024-03-04 | End: 2024-03-04 | Stop reason: HOSPADM

## 2024-03-04 RX ORDER — ROPIVACAINE HYDROCHLORIDE 5 MG/ML
INJECTION, SOLUTION EPIDURAL; INFILTRATION; PERINEURAL
Status: COMPLETED | OUTPATIENT
Start: 2024-03-04 | End: 2024-03-04

## 2024-03-04 RX ORDER — DEXAMETHASONE SODIUM PHOSPHATE 10 MG/ML
INJECTION, SOLUTION INTRAMUSCULAR; INTRAVENOUS PRN
Status: DISCONTINUED | OUTPATIENT
Start: 2024-03-04 | End: 2024-03-04 | Stop reason: SDUPTHER

## 2024-03-04 RX ORDER — MIDAZOLAM HYDROCHLORIDE 2 MG/2ML
INJECTION, SOLUTION INTRAMUSCULAR; INTRAVENOUS PRN
Status: DISCONTINUED | OUTPATIENT
Start: 2024-03-04 | End: 2024-03-04 | Stop reason: SDUPTHER

## 2024-03-04 RX ORDER — LIDOCAINE HYDROCHLORIDE 10 MG/ML
INJECTION, SOLUTION EPIDURAL; INFILTRATION; INTRACAUDAL; PERINEURAL PRN
Status: DISCONTINUED | OUTPATIENT
Start: 2024-03-04 | End: 2024-03-04 | Stop reason: SDUPTHER

## 2024-03-04 RX ORDER — FENTANYL CITRATE 50 UG/ML
INJECTION, SOLUTION INTRAMUSCULAR; INTRAVENOUS PRN
Status: DISCONTINUED | OUTPATIENT
Start: 2024-03-04 | End: 2024-03-04 | Stop reason: SDUPTHER

## 2024-03-04 RX ORDER — SODIUM CHLORIDE, SODIUM LACTATE, POTASSIUM CHLORIDE, CALCIUM CHLORIDE 600; 310; 30; 20 MG/100ML; MG/100ML; MG/100ML; MG/100ML
INJECTION, SOLUTION INTRAVENOUS CONTINUOUS
Status: DISCONTINUED | OUTPATIENT
Start: 2024-03-04 | End: 2024-03-04 | Stop reason: HOSPADM

## 2024-03-04 RX ORDER — SODIUM CHLORIDE 0.9 % (FLUSH) 0.9 %
5-40 SYRINGE (ML) INJECTION PRN
Status: DISCONTINUED | OUTPATIENT
Start: 2024-03-04 | End: 2024-03-04 | Stop reason: HOSPADM

## 2024-03-04 RX ORDER — SODIUM CHLORIDE 0.9 % (FLUSH) 0.9 %
5-40 SYRINGE (ML) INJECTION EVERY 12 HOURS SCHEDULED
Status: DISCONTINUED | OUTPATIENT
Start: 2024-03-04 | End: 2024-03-04 | Stop reason: HOSPADM

## 2024-03-04 RX ADMIN — PROPOFOL 200 MG: 10 INJECTION, EMULSION INTRAVENOUS at 09:55

## 2024-03-04 RX ADMIN — SODIUM CHLORIDE, POTASSIUM CHLORIDE, SODIUM LACTATE AND CALCIUM CHLORIDE: 600; 310; 30; 20 INJECTION, SOLUTION INTRAVENOUS at 08:47

## 2024-03-04 RX ADMIN — ONDANSETRON 4 MG: 2 INJECTION INTRAMUSCULAR; INTRAVENOUS at 10:02

## 2024-03-04 RX ADMIN — FENTANYL CITRATE 50 MCG: 50 INJECTION, SOLUTION INTRAMUSCULAR; INTRAVENOUS at 11:45

## 2024-03-04 RX ADMIN — DEXAMETHASONE SODIUM PHOSPHATE 10 MG: 10 INJECTION, SOLUTION INTRAMUSCULAR; INTRAVENOUS at 09:40

## 2024-03-04 RX ADMIN — SODIUM CHLORIDE 3000 MG: 9 INJECTION, SOLUTION INTRAVENOUS at 09:46

## 2024-03-04 RX ADMIN — SODIUM CHLORIDE, POTASSIUM CHLORIDE, SODIUM LACTATE AND CALCIUM CHLORIDE: 600; 310; 30; 20 INJECTION, SOLUTION INTRAVENOUS at 11:42

## 2024-03-04 RX ADMIN — FENTANYL CITRATE 50 MCG: 50 INJECTION, SOLUTION INTRAMUSCULAR; INTRAVENOUS at 09:34

## 2024-03-04 RX ADMIN — ROPIVACAINE HYDROCHLORIDE 20 ML: 5 INJECTION, SOLUTION EPIDURAL; INFILTRATION; PERINEURAL at 09:40

## 2024-03-04 RX ADMIN — MIDAZOLAM HYDROCHLORIDE 2 MG: 1 INJECTION, SOLUTION INTRAMUSCULAR; INTRAVENOUS at 09:34

## 2024-03-04 RX ADMIN — LIDOCAINE HYDROCHLORIDE 50 MG: 10 INJECTION, SOLUTION EPIDURAL; INFILTRATION; INTRACAUDAL; PERINEURAL at 09:55

## 2024-03-04 ASSESSMENT — PAIN DESCRIPTION - DESCRIPTORS: DESCRIPTORS: SORE

## 2024-03-04 ASSESSMENT — LIFESTYLE VARIABLES: SMOKING_STATUS: 1

## 2024-03-04 ASSESSMENT — PAIN - FUNCTIONAL ASSESSMENT: PAIN_FUNCTIONAL_ASSESSMENT: 0-10

## 2024-03-04 ASSESSMENT — PAIN SCALES - GENERAL
PAINLEVEL_OUTOF10: 0
PAINLEVEL_OUTOF10: 0

## 2024-03-04 NOTE — OP NOTE
drape the left upper extremity usual sterile fashion.  We then had a timeout and the patient, procedure, operative site were confirmed our agreement the OR.  We then used fluoroscopy to base our incision directly over the radial tuberosity.  We then inflated the tourniquet.  We then made an incision about 3 cm long.  We dissected through skin and subcutaneous tissue.  We then used blunt dissection to palpate the biceps tendon stump was retracted proximally into the arm.  We did have to use Metzenbaum scissors to free it from scarring.  Once we mobilized it well we removed it from the wound and then performed a [place] type suture with the Arthrex bite subtenon kit.  We did bowl ties the end which did have tendinosis.  We then proceeded to dissect down to the radial tuberosity.  We utilized fluoroscopy guidance for this as well.  We did ligate the leash of vessels with silk ties.  Once we identified the radial tuberosity we placed a guidepin bicortically.  This was confirmed in the radial tuberosity on multiplanar views.  We then proceeded to over ream at a size 8.5 tunnel.  This tunnel was drilled new cortically.  We then passed the tendon into the tunnel with the biceps button which was placed bicortically and flipped and confirmed on fluoroscopic views.  We then shuttled the tendon into the tunnel.  We then ran the suture through the tendon and tied it down to secure it.  At this point the tendon was stable within the tunnel.  We let down the tourniquet and obtained hemostasis.  We then copiously irrigated the wound.  We then closed with 3-0 Vicryl followed by a running Monocryl.  Dermabond was applied on the skin.  A long-arm splint was applied.  Patient will be nonweightbearing with a splint to the left upper extremity for 2 weeks.  Follow-up in 2 weeks for splint removal and conversion to hinged elbow brace.    Electronically signed by Yordy Sandra DO on 3/4/2024 at 11:57 AM

## 2024-03-04 NOTE — ANESTHESIA PROCEDURE NOTES
Peripheral Block    Patient location during procedure: pre-op  Reason for block: post-op pain management and at surgeon's request  Start time: 3/4/2024 9:35 AM  End time: 3/4/2024 9:45 AM  Staffing  Performed: resident/CRNA   Resident/CRNA: Nikhil Contreras APRN - CRNA  Performed by: Nikhil Contreras APRN - CRNA  Authorized by: Nikhil Contreras APRN - CRNA    Preanesthetic Checklist  Completed: patient identified, IV checked, site marked, risks and benefits discussed, surgical/procedural consents, equipment checked, pre-op evaluation, timeout performed, anesthesia consent given, oxygen available, monitors applied/VS acknowledged, fire risk safety assessment completed and verbalized and blood product R/B/A discussed and consented  Peripheral Block   Patient position: sitting  Prep: ChloraPrep  Provider prep: mask and sterile gloves  Patient monitoring: cardiac monitor, continuous pulse ox, frequent blood pressure checks, IV access and responsive to questions  Block type: Brachial plexus  Supraclavicular  Laterality: left  Injection technique: single-shot  Guidance: nerve stimulator and ultrasound guided    Needle   Needle type: Other   Needle gauge: 22 G  Needle localization: ultrasound guidance and nerve stimulator  Needle length: 8 cmOther needle type: pajunk  Assessment   Injection assessment: negative aspiration for heme, no paresthesia on injection, local visualized surrounding nerve on ultrasound and no intravascular symptoms  Paresthesia pain: none  Slow fractionated injection: yes  Hemodynamics: stable  Outcomes: patient tolerated procedure well and uncomplicated    Medications Administered  ropivacaine (NAROPIN) injection 0.5% - Perineural   20 mL - 3/4/2024 9:40:00 AM

## 2024-03-04 NOTE — DISCHARGE INSTRUCTIONS
Orthopedic Instructions:  -Weight bearing status: No weight bearing. Maintain sling and splint at all times.   -Keep dressing clean and dry.  -Dress with plastic bag and rubber band to seal and repeat with second bag and rubber band when showering.  \"Press & Seal\" wrap also works for sealing the rubber bag when showering.  -Ice (20 minutes on and off 1 hour) and elevate as needed for swelling/pain  -Drink plenty of fluids.  -Call the office or come to Emergency Room if signs of infection appear (hot, swollen, red, draining pus, fever)  -Take medications as prescribed.  -Wean off narcotics (percocet/norco) as soon as possible. Do not take tylenol if still taking narcotics.  -No alcoholic beverages or driving/operating machinery while on narcotics  -Follow up with  in his office in 10-14 days after surgery/discharge.

## 2024-03-04 NOTE — ANESTHESIA PRE PROCEDURE
Department of Anesthesiology  Preprocedure Note       Name:  Willis Evangelista   Age:  38 y.o.  :  1985                                          MRN:  [id number]         Date:  3/4/2024      Surgeon: Surgeon(s):  Yordy Sandra DO    Procedure: Procedure(s):  LEFT BICEPS TENDON REPAIR    Medications prior to admission:   Prior to Admission medications    Medication Sig Start Date End Date Taking? Authorizing Provider   ibuprofen (IBU) 800 MG tablet Take 1 tablet by mouth every 8 hours as needed for Pain 24   Willis Beavers MD   traMADol (ULTRAM) 50 MG tablet Take 1 tablet by mouth every 4 hours as needed. 1/24/24 3/24/24  Provider, MD Isidro       Current medications:    Current Facility-Administered Medications   Medication Dose Route Frequency Provider Last Rate Last Admin    sodium chloride flush 0.9 % injection 5-40 mL  5-40 mL IntraVENous 2 times per day Yordy Sandra DO        sodium chloride flush 0.9 % injection 5-40 mL  5-40 mL IntraVENous PRN Yordy Sandra DO        0.9 % sodium chloride infusion   IntraVENous PRN Yordy Sandra DO        0.9 % sodium chloride infusion   IntraVENous Continuous Yordy Sandra DO        ceFAZolin (ANCEF) 3,000 mg in sodium chloride 0.9 % 100 mL IVPB  3,000 mg IntraVENous On Call to OR Yordy Sandra DO        lactated ringers IV soln infusion   IntraVENous Continuous Yordy Sandra  mL/hr at 24 0847 New Bag at 24 0847       Allergies:  No Known Allergies    Problem List:    Patient Active Problem List   Diagnosis Code    Asymptomatic varicose veins of both lower extremities I83.93    Venous stasis dermatitis of both lower extremities I87.2    Hemosiderin pigmentation of skin 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

## 2024-03-04 NOTE — ANESTHESIA POSTPROCEDURE EVALUATION
Department of Anesthesiology  Postprocedure Note    Patient: Willis Evangelista  MRN: [id number]  YOB: 1985  Date of evaluation: 3/4/2024    Procedure Summary       Date: 03/04/24 Room / Location: [address]    Anesthesia Start: 0947 Anesthesia Stop: 1159    Procedure: LEFT BICEPS TENDON REPAIR (Left) Diagnosis:       Traumatic partial tear of left biceps tendon, initial encounter      (Traumatic partial tear of left biceps tendon, initial encounter [S46.212A])    Surgeons: Yordy Sandra DO Responsible Provider: Nikhil Contreras APRN - CRNA    Anesthesia Type: general ASA Status: 3            Anesthesia Type: No value filed.    Nika Phase I: Nika Score: 10    Nika Phase II: Nika Score: 10    Anesthesia Post Evaluation    Patient location during evaluation: PACU  Patient participation: complete - patient participated  Level of consciousness: awake and lethargic  Pain score: 0  Airway patency: patent  Nausea & Vomiting: no nausea and no vomiting  Cardiovascular status: hemodynamically stable  Respiratory status: acceptable, room air and spontaneous ventilation  Hydration status: euvolemic  Multimodal analgesia pain management approach  Pain management: adequate and satisfactory to patient    No notable events documented.

## 2024-03-04 NOTE — H&P
appropriately. Surgical risks including but not limited to: bleeding, blood clots, infection, damage to surrounding tissues/nerves/vessels, failure of fixation, failure of wounds to heal, loss of motion, stiffness, dislocation, postoperative pain, recurrence of symptoms, need for future surgery,  risks of anesthesia, loss of limb and loss of life were all discussed with the patient. Knowing these risks, the patient wishes to proceed with surgery.   -Abx OCTOR  -Site marked, Consent in chart  -AC held  -NPO since midnight  -All question answered.    Yordy Sandra, DO   Orthopedic Surgery

## 2024-03-27 ENCOUNTER — HOSPITAL ENCOUNTER (OUTPATIENT)
Dept: PHYSICAL THERAPY | Age: 39
Setting detail: THERAPIES SERIES
Discharge: HOME | End: 2024-03-27
Payer: COMMERCIAL

## 2024-03-27 PROCEDURE — 97162 PT EVAL MOD COMPLEX 30 MIN: CPT

## 2024-03-27 ASSESSMENT — PAIN SCALES - GENERAL: PAINLEVEL_OUTOF10: 4

## 2024-03-27 NOTE — THERAPY EVALUATION
Phone: [phone]                       [facility]         Fax: [phone]                      Outpatient Physical Therapy                                                                      Evaluation    Date: 3/27/2024  Patient: Willis Evangelista  : 1985  ACCT #: [id number]    Referring Practitioner: Dr. Yordy Sandra  Diagnosis: Left bicep tendon rupture with repain 3/4/24    Treatment Diagnosis: Left bicep repair;  weakness  Onset Date: 24  PT Insurance Information: CRS    Per Physician Order  Total # of Visits to Date: 1  No Show: 0  Canceled Appointment: 0     Subjective     Additional Pertinent Hx: Initially injured when attemtping to stop a running dog trying to catch it;  noted immediate swelling and bruising and heard a pop 24.  Underwent left bicep tendon repair 3/4/24.  Currently in locked hinge brace.  Limited ability to complete ADL's.  UEFS= 1/80.   PMHx otherwise negative  Pain Assessment  Pain Level: 4     IADL History  Active : No  Occupation: Full time employment  Type of occupation:  at [employer]  Leisure & Hobbies: Normally active around home    Objective  Vision  Vision: Within Functional Limits  Hearing  Hearing: Within functional limits  Observation/Palpation  Observation: Healing incisional area of distal bicep  Edema: Moderate edema throughout bicep and distal arm     Strength LUE  Comment: Not formally tested due to recent surgery and post op protocol    AROM LUE (degrees)  LUE General AROM: Limited wrist and fingers/unable to fully close hand     PROM LUE (degrees)  LUE General PROM: Left shoulder not tested;  elbow flexion to 120 deg;  extension 60 deg                                     Assessment  Assessment: Patient underwent left bicep tendon repair 3/4/24.   Plan to progress with gentle PROM as per protocol and advance with strengthening and functional activities  Therapy Prognosis: Good    Clinical

## 2024-03-27 NOTE — PLAN OF CARE
[facility]       Phone: [phone]   Date: 3/27/2024                      Outpatient Physical Therapy  Fax: [phone]    ACCT #: [id number]                     Plan of Care  [facility]#: [id number]  Patient: Willis Evangelista  : 1985    Referring Practitioner: Dr. Yordy Sandra    Diagnosis: Left bicep tendon rupture with repain 3/4/24  Onset Date: 24  Treatment Diagnosis: Left bicep repair;  weakness    Assessment: Patient underwent left bicep tendon repair 3/4/24.   Plan to progress with gentle PROM as per protocol and advance with strengthening and functional activities  Therapy Prognosis: Good    Treatment Plan :   Days: 2 times per week Weeks: 8 weeks      Patient Education/HEP, Therapeutic Exercise, and Manual Therapy     Goals  Time Frame for Short Term Goals: 6 visits  Short Term Goal 1: Educate on home program of distal wrist/hand ex for ROM and strengthening  Short Term Goal 2: PROM elbow extension to 30 deg    Time Frame for Long Term Goals : 16 visits  Long Term Goal 1: PROM elbow extension 0 deg  Long Term Goal 2: Strength left elbow flexion 3+/5 to lift coffee cup/light weights and to complete self-care tasks  Long Term Goal 3: Left shoulder ROM WFL to reach beind head/behind back  Long Term Goal 4: Left  strength 70#     GALEN ROCHA, PT   Date: 3/27/2024    ______________________________________ Date: 3/27/2024  Physician Signature  By signing above or cosigning electronically, I have reviewed this Plan of Care and certify a need for medically necessary rehabilitation services.

## 2024-04-01 ENCOUNTER — HOSPITAL ENCOUNTER (OUTPATIENT)
Dept: GENERAL RADIOLOGY | Age: 39
Discharge: HOME | End: 2024-04-03
Payer: COMMERCIAL

## 2024-04-01 ENCOUNTER — HOSPITAL ENCOUNTER (OUTPATIENT)
Age: 39
Discharge: HOME | End: 2024-04-03
Payer: COMMERCIAL

## 2024-04-01 ENCOUNTER — HOSPITAL ENCOUNTER (OUTPATIENT)
Dept: PHYSICAL THERAPY | Age: 39
Setting detail: THERAPIES SERIES
Discharge: HOME | End: 2024-04-01
Payer: COMMERCIAL

## 2024-04-01 DIAGNOSIS — S46.212A TRAUMATIC PARTIAL TEAR OF LEFT BICEPS TENDON, INITIAL ENCOUNTER: ICD-10-CM

## 2024-04-01 PROCEDURE — 73080 X-RAY EXAM OF ELBOW: CPT

## 2024-04-01 PROCEDURE — 97110 THERAPEUTIC EXERCISES: CPT

## 2024-04-01 NOTE — PROGRESS NOTES
Phone: [phone]                 [facility]      Fax: [phone]                            Outpatient Physical Therapy                                                                            Daily Note    Date: 2024  Patient Name: Willis Evangelista        MRN: [id number]   ACCT#:  [id number]  : 1985  (38 y.o.)                  Referring Practitioner: Dr. Yordy Sandra  Diagnosis: Left bicep tendon rupture with repain 3/4/24    Treatment Diagnosis: Left bicep repair;  weakness  Onset Date: 24  PT Insurance Information: CRS    Per Physician Order  Total # of Visits to Date: 2  No Show: 0  Canceled Appointment: 0              Pre-Treatment Pain:  3/10     Assessment  Assessment: Patient report soreness from having x-rays completed at MD office; brace set to 50 deg extension.  Distal ext edema persists with limited hand closure.  Issued stockinette for compression and will monitor benefits.  PROM elbow ext 50 deg. Limited full forearm supination.    Plan  Continue with current plan of care    Exercises/Modalities/Manual:  See DocFlow Sheet    Education: POC;  issued stockinette for compression          Goals  ( ) 2 visits   Short Term Goals  Time Frame for Short Term Goals: 6 visits  Short Term Goal 1: Educate on home program of distal wrist/hand ex for ROM and strengthening  Short Term Goal 2: PROM elbow extension to 30 deg     Long Term Goals  Time Frame for Long Term Goals : 16 visits  Long Term Goal 1: PROM elbow extension 0 deg  Long Term Goal 2: Strength left elbow flexion 3+/5 to lift coffee cup/light weights and to complete self-care tasks  Long Term Goal 3: Left shoulder ROM WFL to reach beind head/behind back  Long Term Goal 4: Left  strength 70#       Post Treatment Pain:  2-3/10    Time In: 1530    Time Out : 1600        Timed Code Treatment Minutes: 30 Minutes    Total Time:   30 Minutes    GALEN ROCHA PT     Date: 2024

## 2024-04-05 ENCOUNTER — HOSPITAL ENCOUNTER (OUTPATIENT)
Dept: PHYSICAL THERAPY | Age: 39
Setting detail: THERAPIES SERIES
Discharge: HOME | End: 2024-04-05
Payer: COMMERCIAL

## 2024-04-05 PROCEDURE — 97110 THERAPEUTIC EXERCISES: CPT

## 2024-04-05 NOTE — PROGRESS NOTES
Phone: [phone]                 [facility]      Fax: [phone]                            Outpatient Physical Therapy                                                                            Daily Note    Date: 2024  Patient Name: Willis Evangelista        MRN: [id number]   ACCT#:  [id number]  : 1985  (38 y.o.)    Referring Provider (secondary): Dr. Yordy Sandra         Diagnosis: Left bicep tendon rupture with repain 3/4/24  Treatment Diagnosis: Left bicep repair;  weakness    Onset Date: 24  PT Insurance Information: CRS    Per Physician Order  Total # of Visits to Date: 3  No Show: 0  Canceled Appointment: 0  Plan of Care/Certification Expiration Date: 05/15/24    Pre-Treatment Pain:  2/10     Assessment  Assessment: Decreased edema noted with use of stockinette.  PROM elbow extension to 30 deg; limied with full supination.   Issued yellow tputty for HEP.  Also educated on passive elbow ext in standing.   Patient very anxious regarding re-injury.    Plan  Continue with current plan of care    Exercises/Modalities/Manual:  See DocFlow Sheet    Education: Issued yellow tputty for  strengthening;  educated on standing passive elbow extension/hanging.          Goals  (Total # of Visits to Date: 3)   Short Term Goals  Time Frame for Short Term Goals: 6 visits  Short Term Goal 1: Educate on home program of distal wrist/hand ex for ROM and strengthening- MET  Short Term Goal 2: PROM elbow extension to 30 deg-  MET    Long Term Goals  Time Frame for Long Term Goals : 16 visits  Long Term Goal 1: PROM elbow extension 0 deg  Long Term Goal 2: Strength left elbow flexion 3+/5 to lift coffee cup/light weights and to complete self-care tasks  Long Term Goal 3: Left shoulder ROM WFL to reach beind head/behind back  Long Term Goal 4: Left  strength 70#    Post Treatment Pain:  2/10    Time In: 1350    Time Out : 1430        Timed Code Treatment Minutes: 40 Minutes    Total Time:

## 2024-04-09 ENCOUNTER — HOSPITAL ENCOUNTER (OUTPATIENT)
Dept: PHYSICAL THERAPY | Age: 39
Setting detail: THERAPIES SERIES
Discharge: HOME | End: 2024-04-09
Payer: COMMERCIAL

## 2024-04-09 PROCEDURE — 97110 THERAPEUTIC EXERCISES: CPT

## 2024-04-09 NOTE — PROGRESS NOTES
Phone: [phone]                 [facility]      Fax: [phone]                            Outpatient Physical Therapy                                                                            Daily Note    Date: 2024  Patient Name: Willis Evangelista        MRN: [id number]   ACCT#:  [id number]  : 1985  (38 y.o.)    Referring Provider (secondary): Dr. Yordy Sandra         Diagnosis: Left bicep tendon rupture with repain 3/4/24  Treatment Diagnosis: Left bicep repair;  weakness    Onset Date: 24  PT Insurance Information: CRS    Per Physician Order  Total # of Visits to Date: 4  No Show: 0  Canceled Appointment: 0  Plan of Care/Certification Expiration Date: 05/15/24    Pre-Treatment Pain:  0-1/10     Assessment  Assessment: Patient notes general soreness.   Wearing brace but decreasing use at home when not active.  Less edema noted.  Initiated UBE for AAROM and wrist/forearm active ex.  PROM in standing with patient completing arm dangling to 30 deg which is goal with protocol at 4 weeks.  Progress with forearm ROM and strengthening; progress with active movement per protocol at 6 weeks.    Plan  Continue with current plan of care    Exercises/Modalities/Manual:  See DocFlow Sheet    Education: Gentle stretching for supination;  gentle cross friction massage of scar tissue          Goals  (Total # of Visits to Date: 4)   Short Term Goals  Time Frame for Short Term Goals: 6 visits  Short Term Goal 1: Educate on home program of distal wrist/hand ex for ROM and strengthening- MET  Short Term Goal 2: PROM elbow extension to 30 deg- MET    Long Term Goals  Time Frame for Long Term Goals : 16 visits  Long Term Goal 1: PROM elbow extension 0 deg  Long Term Goal 2: Strength left elbow flexion 3+/5 to lift coffee cup/light weights and to complete self-care tasks  Long Term Goal 3: Left shoulder ROM WFL to reach beind head/behind back  Long Term Goal 4: Left  strength 70#    Post

## 2024-04-12 ENCOUNTER — HOSPITAL ENCOUNTER (OUTPATIENT)
Dept: PHYSICAL THERAPY | Age: 39
Setting detail: THERAPIES SERIES
Discharge: HOME | End: 2024-04-12
Payer: COMMERCIAL

## 2024-04-12 PROCEDURE — 97110 THERAPEUTIC EXERCISES: CPT

## 2024-04-12 NOTE — PROGRESS NOTES
Phone: [phone]                 [facility]      Fax: [phone]                            Outpatient Physical Therapy                                                                            Daily Note    Date: 2024  Patient Name: Willis Evangelista        MRN: [id number]   ACCT#:  [id number]  : 1985  (38 y.o.)    Referring Provider (secondary): Dr. Yordy Sandra         Diagnosis: Left bicep tendon rupture with repain 3/4/24  Treatment Diagnosis: Left bicep repair;  weakness    Onset Date: 24  PT Insurance Information: CRS    Per Physician Order  Total # of Visits to Date: 5  No Show: 0  Canceled Appointment: 0  Plan of Care/Certification Expiration Date: 05/15/24    Pre-Treatment Pain:  0/10     Assessment  Assessment: Patient continues to report \"soreness\".  Moderate edema noted throughout hand/fingers which limited ability to grasp.  Tightness along incisional area.  PROM 30 deg with self-stretching arm dangling;  20 deg with over pressure.  Forearm supination 25-30 deg.  Educated on weight bearing wrist flexor stretch and also use of hammer for pro/sup strengthening.  Progress per protocol with ROM and strengthening;  6 weeks post op 4/15/24    Plan  Continue with current plan of care    Exercises/Modalities/Manual:  See DocFlow Sheet    Education: Passive wrist flex stretch with weight bearing on table          Goals  (Total # of Visits to Date: 5)   Short Term Goals  Time Frame for Short Term Goals: 6 visits  Short Term Goal 1: Educate on home program of distal wrist/hand ex for ROM and strengthening- MET  Short Term Goal 2: PROM elbow extension to 30 deg- MET    Long Term Goals  Time Frame for Long Term Goals : 16 visits  Long Term Goal 1: PROM elbow extension 0 deg  Long Term Goal 2: Strength left elbow flexion 3+/5 to lift coffee cup/light weights and to complete self-care tasks  Long Term Goal 3: Left shoulder ROM WFL to reach beind head/behind back  Long Term Goal

## 2024-04-15 ENCOUNTER — HOSPITAL ENCOUNTER (OUTPATIENT)
Dept: PHYSICAL THERAPY | Age: 39
Setting detail: THERAPIES SERIES
Discharge: HOME | End: 2024-04-15
Payer: COMMERCIAL

## 2024-04-15 PROCEDURE — 97110 THERAPEUTIC EXERCISES: CPT

## 2024-04-15 ASSESSMENT — PAIN SCALES - GENERAL: PAINLEVEL_OUTOF10: 4

## 2024-04-15 NOTE — PROGRESS NOTES
Code Treatment Minutes: 40 Minutes  Total Treatment Time: 40 Minutes    Thien Mallory, NORBERTO     Date: 4/15/2024

## 2024-04-18 ENCOUNTER — HOSPITAL ENCOUNTER (OUTPATIENT)
Dept: PHYSICAL THERAPY | Age: 39
Setting detail: THERAPIES SERIES
Discharge: HOME | End: 2024-04-18
Payer: COMMERCIAL

## 2024-04-18 PROCEDURE — 97110 THERAPEUTIC EXERCISES: CPT

## 2024-04-18 ASSESSMENT — PAIN SCALES - GENERAL: PAINLEVEL_OUTOF10: 4

## 2024-04-18 NOTE — PROGRESS NOTES
Phone: [phone]                 [facility]      Fax: [phone]                            Outpatient Physical Therapy                                                                            Daily Note    Date: 2024  Patient Name: Willis Evangelista        MRN: [id number]   ACCT#:  [id number]  : 1985  (38 y.o.)    Referring Provider (secondary): Dr. Yordy Sandra         Diagnosis: Left bicep tendon rupture with repain 3/4/24  Treatment Diagnosis: Left bicep repair;  weakness    Onset Date: 24  PT Insurance Information: CRS    Per Physician Order  Total # of Visits to Date: 7  No Show: 0  Canceled Appointment: 0  Plan of Care/Certification Expiration Date: 05/15/24    Pre-Treatment Pain:  4/10     Assessment  Assessment: Patient continues to report soreness in elbow. Still has increased swelling also, but notes compression glove has helped. Following last session patient notes elbow and muscles were sore. Continued with strengthening and ROM exercises per flowsheet. L elbow PROM =  deg. Post session patient notes pain remains a 4/10.    Plan  Continue with current plan of care    Exercises/Modalities/Manual:  See DocFlow Sheet    Education: Discharging elbow brace     Goals  (Total # of Visits to Date: 7)   Short Term Goals  Time Frame for Short Term Goals: 6 visits  Short Term Goal 1: Educate on home program of distal wrist/hand ex for ROM and strengthening- MET  Short Term Goal 2: PROM elbow extension to 30 deg- MET    Long Term Goals  Time Frame for Long Term Goals : 16 visits  Long Term Goal 1: PROM elbow extension 0 deg  Long Term Goal 2: Strength left elbow flexion 3+/5 to lift coffee cup/light weights and to complete self-care tasks  Long Term Goal 3: Left shoulder ROM WFL to reach beind head/behind back  Long Term Goal 4: Left  strength 70#    Post Treatment Pain:  4/10    Time In: 1625    Time Out : 1710   Timed Code Treatment Minutes: 45 Minutes  Total

## 2024-04-22 ENCOUNTER — HOSPITAL ENCOUNTER (OUTPATIENT)
Dept: PHYSICAL THERAPY | Age: 39
Setting detail: THERAPIES SERIES
Discharge: HOME | End: 2024-04-22
Payer: COMMERCIAL

## 2024-04-22 PROCEDURE — 97110 THERAPEUTIC EXERCISES: CPT

## 2024-04-22 ASSESSMENT — PAIN SCALES - GENERAL: PAINLEVEL_OUTOF10: 4

## 2024-04-25 ENCOUNTER — HOSPITAL ENCOUNTER (OUTPATIENT)
Dept: PHYSICAL THERAPY | Age: 39
Setting detail: THERAPIES SERIES
Discharge: HOME | End: 2024-04-25
Payer: COMMERCIAL

## 2024-04-25 PROCEDURE — 97110 THERAPEUTIC EXERCISES: CPT

## 2024-04-25 ASSESSMENT — PAIN SCALES - GENERAL: PAINLEVEL_OUTOF10: 4

## 2024-04-25 NOTE — PROGRESS NOTES
Phone: [phone]                 [facility]      Fax: [phone]                            Outpatient Physical Therapy                                                                            Daily Note    Date: 2024  Patient Name: Willis Evangelista        MRN: [id number]   ACCT#:  [id number]  : 1985  (38 y.o.)    Referring Provider (secondary): Dr. Yordy Sandra         Diagnosis: Left bicep tendon rupture with repain 3/4/24  Treatment Diagnosis: Left bicep repair;  weakness    Onset Date: 24  PT Insurance Information: CRS    Per Physician Order  Total # of Visits to Date: 9  No Show: 0  Canceled Appointment: 0  Plan of Care/Certification Expiration Date: 05/15/24    Pre-Treatment Pain:  4/10     Assessment  Assessment: Patient continues to report 4/10 soreness in elbow. Continued with exercises as outlined to improve shoulder and elbow strength and ROM. L elbow PROM ext = 15 degFN. L elbow PROM supination = 35 deg. Post session patient notes L elbow pain remains a 4/10. Will continue to progress as able.    Plan  Continue with current plan of care    Exercises/Modalities/Manual:  See DocFlow Sheet    Education: Elbow PROM stretch at home     Goals  (Total # of Visits to Date: 9)   Short Term Goals  Time Frame for Short Term Goals: 6 visits  Short Term Goal 1: Educate on home program of distal wrist/hand ex for ROM and strengthening- MET  Short Term Goal 2: PROM elbow extension to 30 deg- MET    Long Term Goals  Time Frame for Long Term Goals : 16 visits  Long Term Goal 1: PROM elbow extension 0 deg  Long Term Goal 2: Strength left elbow flexion 3+/5 to lift coffee cup/light weights and to complete self-care tasks  Long Term Goal 3: Left shoulder ROM WFL to reach beind head/behind back  Long Term Goal 4: Left  strength 70#    Post Treatment Pain:  4/10    Time In: 1430    Time Out : 1510   Timed Code Treatment Minutes: 40 Minutes  Total Treatment Time: 40 Minutes    Thien

## 2024-04-30 ENCOUNTER — HOSPITAL ENCOUNTER (OUTPATIENT)
Dept: PHYSICAL THERAPY | Age: 39
Setting detail: THERAPIES SERIES
Discharge: HOME | End: 2024-04-30
Payer: COMMERCIAL

## 2024-04-30 PROCEDURE — 97110 THERAPEUTIC EXERCISES: CPT

## 2024-04-30 ASSESSMENT — PAIN SCALES - GENERAL: PAINLEVEL_OUTOF10: 4

## 2024-04-30 NOTE — PROGRESS NOTES
Phone: [phone]                 [facility]      Fax: [phone]                            Outpatient Physical Therapy                                                                            Daily Note    Date: 2024  Patient Name: Willis Evangelista        MRN: [id number]   ACCT#:  [id number]  : 1985  (38 y.o.)    Referring Provider (secondary): Dr. Yordy Sandra         Diagnosis: Left bicep tendon rupture with repain 3/4/24  Treatment Diagnosis: Left bicep repair;  weakness    Onset Date: 24  PT Insurance Information: CRS  Total # of Visits Approved: 16 Per Physician Order  Total # of Visits to Date: 10  No Show: 0  Canceled Appointment: 0  Plan of Care/Certification Expiration Date: 05/15/24    Pre-Treatment Pain:  4/10     Assessment  Assessment: Patient contines to report soreness in elbow and doesn't say a pain number. Patient feels he is able to supinate hand a little better. Continued with elbow strengthening and ROM exercises per flowsheet. L elbow PROM ext = 13 degFN. L elbow AROM supination = 50 deg. Issued green foam block to patient for him to work on  and pinch strength.    Plan  Continue with current plan of care    Exercises/Modalities/Manual:  See DocFlow Sheet    Education: Issued green foam block to work on pinch and  strength     Goals  (Total # of Visits to Date: 10)   Short Term Goals  Time Frame for Short Term Goals: 6 visits  Short Term Goal 1: Educate on home program of distal wrist/hand ex for ROM and strengthening- MET  Short Term Goal 2: PROM elbow extension to 30 deg- MET    Long Term Goals  Time Frame for Long Term Goals : 16 visits  Long Term Goal 1: PROM elbow extension 0 deg  Long Term Goal 2: Strength left elbow flexion 3+/5 to lift coffee cup/light weights and to complete self-care tasks  Long Term Goal 3: Left shoulder ROM WFL to reach beind head/behind back  Long Term Goal 4: Left  strength 70#    Post Treatment Pain:

## 2024-05-03 ENCOUNTER — HOSPITAL ENCOUNTER (OUTPATIENT)
Dept: PHYSICAL THERAPY | Age: 39
Setting detail: THERAPIES SERIES
Discharge: HOME | End: 2024-05-03
Payer: COMMERCIAL

## 2024-05-03 NOTE — PROGRESS NOTES
[facility]  Rehab and Wellness    Date: 5/3/2024  Patient Name: Bellodebbi GLORIA Jean Carlos        : 1985       Pt Cancelled Appt      Thien Mallory PTA Date: 5/3/2024

## 2024-05-07 ENCOUNTER — HOSPITAL ENCOUNTER (OUTPATIENT)
Dept: PHYSICAL THERAPY | Age: 39
Setting detail: THERAPIES SERIES
Discharge: HOME | End: 2024-05-07
Payer: COMMERCIAL

## 2024-05-07 NOTE — PROGRESS NOTES
Physical Therapy  [facility]  Rehab and Wellness    Date: 2024  Patient Name: Bellodebbi Evangelista        : 1985       Called and cancelled, will return on the next visit.      Venecia TRUONG Shock Date: 2024

## 2024-05-10 ENCOUNTER — HOSPITAL ENCOUNTER (OUTPATIENT)
Dept: PHYSICAL THERAPY | Age: 39
Setting detail: THERAPIES SERIES
Discharge: HOME | End: 2024-05-10
Payer: COMMERCIAL

## 2024-05-10 PROCEDURE — 97110 THERAPEUTIC EXERCISES: CPT

## 2024-05-10 ASSESSMENT — PAIN SCALES - GENERAL: PAINLEVEL_OUTOF10: 4

## 2024-05-10 NOTE — PROGRESS NOTES
Phone: [phone]                 [facility]      Fax: [phone]                            Outpatient Physical Therapy                                                                            Daily Note    Date: 5/10/2024  Patient Name: Willis Evangelista        MRN: [id number]   ACCT#:  [id number]  : 1985  (38 y.o.)    Referring Provider (secondary): Dr. Yordy Sandra         Diagnosis: Left bicep tendon rupture with repain 3/4/24  Treatment Diagnosis: Left bicep repair;  weakness    Onset Date: 24  PT Insurance Information: CRS  Total # of Visits Approved: 16 Per Physician Order  Total # of Visits to Date: 11  No Show: 0  Canceled Appointment: 2  Plan of Care/Certification Expiration Date: 05/15/24    Pre-Treatment Pain:  4/10     Assessment  Assessment: Patient notes L elbow/forearm continues to be sore. Patient reports following up with Dr. Sandra Monday and he is pleased with patient's progress. He told patient it is going to take some time for soreness in forearm/elbow to go away because of the way they had to move his nerves around. Patient was initially scheduled to go back to work on 6/3, but Dr. Sandra pushed back to beginning of July now. L elbow PROM =  deg and L eblow AROM =  deg. L elbow AROM supination = 60 deg. Post session patient continues to note soreness. Educated patient on rubbing towel on forearm to help with sensitivity. Issued blue foam block for increased resistance with  strengthening.    Plan  Continue with current plan of care    Exercises/Modalities/Manual:  See DocFlow Sheet    Education: Issued blue foam block to help with improving  strength     Goals  (Total # of Visits to Date: 11)   Short Term Goals  Time Frame for Short Term Goals: 6 visits  Short Term Goal 1: Educate on home program of distal wrist/hand ex for ROM and strengthening- MET  Short Term Goal 2: PROM elbow extension to 30 deg- MET    Long Term Goals  Time Frame

## 2024-05-13 ENCOUNTER — HOSPITAL ENCOUNTER (OUTPATIENT)
Dept: PHYSICAL THERAPY | Age: 39
Setting detail: THERAPIES SERIES
Discharge: HOME | End: 2024-05-13
Payer: COMMERCIAL

## 2024-05-13 NOTE — PROGRESS NOTES
Occupational Therapy  [facility]  Rehab and Wellness    Date: 2024  Patient Name: Bellodebbi GLORIA Jean Carlos        : 1985       Pt Cancelled Appt due to no reason for cancel.      Yolie Dixon Date: 2024

## 2024-05-17 ENCOUNTER — HOSPITAL ENCOUNTER (OUTPATIENT)
Dept: PHYSICAL THERAPY | Age: 39
Setting detail: THERAPIES SERIES
Discharge: HOME | End: 2024-05-17
Payer: COMMERCIAL

## 2024-05-17 PROCEDURE — 97110 THERAPEUTIC EXERCISES: CPT

## 2024-05-17 NOTE — PROGRESS NOTES
Phone: [phone]                 [facility]      Fax: [phone]                            Outpatient Physical Therapy                                                                            Daily Note    Date: 2024  Patient Name: Willis Evangelista        MRN: [id number]   ACCT#:  [id number]  : 1985  (38 y.o.)    Referring Provider (secondary): Dr. Yordy Sandra         Diagnosis: Left bicep tendon rupture with repain 3/4/24  Treatment Diagnosis: Left bicep repair;  weakness    Onset Date: 24  PT Insurance Information: CRS  Total # of Visits Approved: 16 Per Physician Order  Total # of Visits to Date: 12  No Show: 0  Canceled Appointment: 2  Plan of Care/Certification Expiration Date: 05/15/24    Pre-Treatment Pain:  3/10     Assessment  Assessment: Patient continues to reports generalized soreness and mild swelling especially of hand/forearm.   Initiated BTE Primus ex for ROM and strengthening with good tolerance.  Emphasis on PROM/stretching into supination.   Patient states he is completing more activity at home but adheres to 5# lifting restriction.  Progress as able    Plan  Continue with current plan of care    Exercises/Modalities/Manual:  See DocFlow Sheet    Education: Home use/activity          Goals  (Total # of Visits to Date: 12)   Short Term Goals  Time Frame for Short Term Goals: 6 visits  Short Term Goal 1: Educate on home program of distal wrist/hand ex for ROM and strengthening- MET  Short Term Goal 2: PROM elbow extension to 30 deg- MET    Long Term Goals  Time Frame for Long Term Goals : 16 visits  Long Term Goal 1: PROM elbow extension 0 deg  Long Term Goal 2: Strength left elbow flexion 3+/5 to lift coffee cup/light weights and to complete self-care tasks  Long Term Goal 3: Left shoulder ROM WFL to reach beind head/behind back  Long Term Goal 4: Left  strength 70#    Post Treatment Pain:  3-4/10    Time In: 1110    Time Out : 1155        Timed Code

## 2024-05-20 ENCOUNTER — HOSPITAL ENCOUNTER (OUTPATIENT)
Dept: PHYSICAL THERAPY | Age: 39
Setting detail: THERAPIES SERIES
Discharge: HOME | End: 2024-05-20
Payer: COMMERCIAL

## 2024-05-20 PROCEDURE — 97110 THERAPEUTIC EXERCISES: CPT

## 2024-05-20 ASSESSMENT — PAIN SCALES - GENERAL: PAINLEVEL_OUTOF10: 4

## 2024-05-20 NOTE — PROGRESS NOTES
Phone: [phone]                 [facility]      Fax: [phone]                            Outpatient Physical Therapy                                                                            Daily Note    Date: 2024  Patient Name: Willis Evangelista        MRN: [id number]   ACCT#:  [id number]  : 1985  (38 y.o.)    Referring Provider (secondary): Dr. Yordy Sandra         Diagnosis: Left bicep tendon rupture with repain 3/4/24  Treatment Diagnosis: Left bicep repair;  weakness    Onset Date: 24  PT Insurance Information: CRS  Total # of Visits Approved: 16 Per Physician Order  Total # of Visits to Date: 13  No Show: 0  Canceled Appointment: 2  Plan of Care/Certification Expiration Date: 05/15/24    Pre-Treatment Pain:  4/10     Assessment  Assessment: Patient states L elbow pain continues to be sore. Progressed Primus exercises to improve elbow strength with good tolerance from patient. L elbow PROM ext = 15 deg and AROM ext = 19 deg. Post session patient continues to note soreness.    Plan  Continue with current plan of care    Exercises/Modalities/Manual:  See DocFlow Sheet    Education: HEP     Goals  (Total # of Visits to Date: 13)   Short Term Goals  Time Frame for Short Term Goals: 6 visits  Short Term Goal 1: Educate on home program of distal wrist/hand ex for ROM and strengthening- MET  Short Term Goal 2: PROM elbow extension to 30 deg- MET    Long Term Goals  Time Frame for Long Term Goals : 16 visits  Long Term Goal 1: PROM elbow extension 0 deg  Long Term Goal 2: Strength left elbow flexion 3+/5 to lift coffee cup/light weights and to complete self-care tasks  Long Term Goal 3: Left shoulder ROM WFL to reach beind head/behind back  Long Term Goal 4: Left  strength 70#    Post Treatment Pain:  4/10    Time In: 1420    Time Out : 1500   Timed Code Treatment Minutes: 40 Minutes  Total Treatment Time: 40 Minutes    Thien Mallory PTA     Date: 2024

## 2024-05-23 ENCOUNTER — HOSPITAL ENCOUNTER (OUTPATIENT)
Dept: PHYSICAL THERAPY | Age: 39
Setting detail: THERAPIES SERIES
Discharge: HOME | End: 2024-05-23
Payer: COMMERCIAL

## 2024-05-23 PROCEDURE — 97110 THERAPEUTIC EXERCISES: CPT

## 2024-05-23 ASSESSMENT — PAIN SCALES - GENERAL: PAINLEVEL_OUTOF10: 4

## 2024-05-23 NOTE — PROGRESS NOTES
Phone: [phone]                 [facility]      Fax: [phone]                            Outpatient Physical Therapy                                                                            Daily Note    Date: 2024  Patient Name: Willis Evangelista        MRN: [id number]   ACCT#:  [id number]  : 1985  (38 y.o.)    Referring Provider (secondary): Dr. Yordy Sandra         Diagnosis: Left bicep tendon rupture with repain 3/4/24  Treatment Diagnosis: Left bicep repair;  weakness    Onset Date: 24  PT Insurance Information: CRS  Total # of Visits Approved: 16 Per Physician Order  Total # of Visits to Date: 14  No Show: 0  Canceled Appointment: 2  Plan of Care/Certification Expiration Date: 05/15/24    Pre-Treatment Pain:  4/10     Assessment  Assessment: Patient continues to report L elbow soreness. Progressed resistance with strengthening exercise this afternoon. Patient reports no increased pain, but does note some muscle soreness following session. Will continue to progress as able.    Plan  Continue with current plan of care    Exercises/Modalities/Manual:  See DocFlow Sheet    Education: HEP     Goals  (Total # of Visits to Date: 14)   Short Term Goals  Time Frame for Short Term Goals: 6 visits  Short Term Goal 1: Educate on home program of distal wrist/hand ex for ROM and strengthening- MET  Short Term Goal 2: PROM elbow extension to 30 deg- MET    Long Term Goals  Time Frame for Long Term Goals : 16 visits  Long Term Goal 1: PROM elbow extension 0 deg  Long Term Goal 2: Strength left elbow flexion 3+/5 to lift coffee cup/light weights and to complete self-care tasks  Long Term Goal 3: Left shoulder ROM WFL to reach beind head/behind back  Long Term Goal 4: Left  strength 70#    Post Treatment Pain:  4/10    Time In: 1337    Time Out : 1409   Timed Code Treatment Minutes: 32 Minutes  Total Treatment Time: 32 Minutes    Thien Mallory PTA     Date: 2024

## 2024-05-28 ENCOUNTER — HOSPITAL ENCOUNTER (OUTPATIENT)
Dept: PHYSICAL THERAPY | Age: 39
Setting detail: THERAPIES SERIES
Discharge: HOME | End: 2024-05-28
Payer: COMMERCIAL

## 2024-05-28 PROCEDURE — 97110 THERAPEUTIC EXERCISES: CPT

## 2024-05-28 ASSESSMENT — PAIN SCALES - GENERAL: PAINLEVEL_OUTOF10: 4

## 2024-05-28 NOTE — PROGRESS NOTES
Phone: [phone]                 [facility]      Fax: [phone]                            Outpatient Physical Therapy                                                                            Daily Note    Date: 2024  Patient Name: Willis Evangelista        MRN: [id number]   ACCT#:  [id number]  : 1985  (38 y.o.)    Referring Provider (secondary): Dr. Yordy Sandra         Diagnosis: Left bicep tendon rupture with repain 3/4/24  Treatment Diagnosis: Left bicep repair;  weakness    Onset Date: 24  PT Insurance Information: CRS  Total # of Visits Approved: 16 Per Physician Order  Total # of Visits to Date: 15  No Show: 0  Canceled Appointment: 2  Plan of Care/Certification Expiration Date: 05/15/24    Pre-Treatment Pain:  4/10     Assessment  Assessment: Patient continues to have some L bicep soreness, but patient notes he has been having muscle spasms in becip and around to tricep over the past two days. Patient reports no increased pain, but feels he isn't able to extend his elbow as far since the spasms. L elbow PROM ext = 15 degFN and AROM ext = 19 degFN. L elbow AROM flex = 140 deg and AROM supination = 75 deg. Post session patint continues to note soreness.    Plan  Continue with current plan of care    Exercises/Modalities/Manual:  See DocFlow Sheet    Education: Exercise form     Goals  (Total # of Visits to Date: 15)   Short Term Goals  Time Frame for Short Term Goals: 6 visits  Short Term Goal 1: Educate on home program of distal wrist/hand ex for ROM and strengthening- MET  Short Term Goal 2: PROM elbow extension to 30 deg- MET    Long Term Goals  Time Frame for Long Term Goals : 16 visits  Long Term Goal 1: PROM elbow extension 0 deg  Long Term Goal 2: Strength left elbow flexion 3+/5 to lift coffee cup/light weights and to complete self-care tasks  Long Term Goal 3: Left shoulder ROM WFL to reach beind head/behind back  Long Term Goal 4: Left  strength 70#    Post

## 2024-05-30 ENCOUNTER — HOSPITAL ENCOUNTER (OUTPATIENT)
Dept: PHYSICAL THERAPY | Age: 39
Setting detail: THERAPIES SERIES
Discharge: HOME | End: 2024-05-30
Payer: COMMERCIAL

## 2024-05-30 PROCEDURE — 97110 THERAPEUTIC EXERCISES: CPT

## 2024-05-30 ASSESSMENT — PAIN SCALES - GENERAL: PAINLEVEL_OUTOF10: 4

## 2024-05-30 NOTE — PROGRESS NOTES
Phone: [phone]                  [facility]            Fax: [phone]                             Outpatient Physical Therapy                                                                      Progress Report    Date: 2024   MRN: [id number]    ACCT#: [id number]  Patient: Willis Evangelista  : 1985  Referring Provider (secondary): Dr. Yordy Sandra         Diagnosis: Left bicep tendon rupture with repain 3/4/24      Treatment Diagnosis: Left bicep repair;  weakness    Onset Date: 24  PT Insurance Information: CRS  Total # of Visits Approved: 16 Per Physician Order  Total # of Visits to Date: 16  No Show: 0  Canceled Appointment: 2    Assessment  Patient has been compliant with therapy appointments and HEP. Patient continues to note soreness feeling. Lately, patient has been having more muscle spasms also. L elbow PROM = 15 deg from neutral and AROM elbow ext = 19 deg from neutral. L elbow AROM flex= 140 deg. AROM supination = 75 deg. Will continue to progress strength and ROM.     Therapy Prognosis: Good    Plan  Continue with current plan of care    Goals  Short Term Goals  Time Frame for Short Term Goals: 6 visits  Short Term Goal 1: Educate on home program of distal wrist/hand ex for ROM and strengthening- MET  Short Term Goal 2: PROM elbow extension to 30 deg- MET    Long Term Goals  Time Frame for Long Term Goals : 16 visits  Long Term Goal 1: PROM elbow extension 0 deg  Long Term Goal 2: Strength left elbow flexion 3+/5 to lift coffee cup/light weights and to complete self-care tasks  Long Term Goal 3: Left shoulder ROM WFL to reach beind head/behind back  Long Term Goal 4: Left  strength 70#    Thien Mallory, NORBERTO    Date: 2024

## 2024-05-30 NOTE — PROGRESS NOTES
Phone: [phone]                 [facility]      Fax: [phone]                            Outpatient Physical Therapy                                                                            Daily Note    Date: 2024  Patient Name: Willis Evangelista        MRN: [id number]   ACCT#:  [id number]  : 1985  (38 y.o.)    Referring Provider (secondary): Dr. Yordy Sandra         Diagnosis: Left bicep tendon rupture with repain 3/4/24  Treatment Diagnosis: Left bicep repair;  weakness    Onset Date: 24  PT Insurance Information: CRS  Total # of Visits Approved: 16 Per Physician Order  Total # of Visits to Date: 16  No Show: 0  Canceled Appointment: 2  Plan of Care/Certification Expiration Date: 05/15/24    Pre-Treatment Pain:  4/10     Assessment  Assessment: Patient continues to note soreness feeling. Since last visit patient notes muscle spasms have lessened. Completed strengthening exercises per flowsheet with good tolerance from patient. Patient reports having days mixed up re: follow up with Dr. Sandra. Patient sees him this coming Monday instead of Thursday. Post session patient continues to note some soreness. Will send progress note.    Plan  Continue with current plan of care    Exercises/Modalities/Manual:  See DocFlow Sheet    Education: HEP     Goals  (Total # of Visits to Date: 16)   Short Term Goals  Time Frame for Short Term Goals: 6 visits  Short Term Goal 1: Educate on home program of distal wrist/hand ex for ROM and strengthening- MET  Short Term Goal 2: PROM elbow extension to 30 deg- MET    Long Term Goals  Time Frame for Long Term Goals : 16 visits  Long Term Goal 1: PROM elbow extension 0 deg  Long Term Goal 2: Strength left elbow flexion 3+/5 to lift coffee cup/light weights and to complete self-care tasks  Long Term Goal 3: Left shoulder ROM WFL to reach beind head/behind back  Long Term Goal 4: Left  strength 70#    Post Treatment Pain:  4/10    Time In:

## 2024-05-31 NOTE — THERAPY RECERTIFICATION
[facility]  Phone: [phone]   Date: 2024                  Outpatient Physical Therapy Fax: [phone]    ACCT #: [id number]                  Recertification  [facility]#: [id number]  Patient: Willis Evangelista  : 1985    Referring Provider: Dr. Yordy Sandra           Diagnosis: Left bicep tendon rupture with repain 3/4/24  Onset Date: 24  Treatment Diagnosis: Left bicep repair;  weakness    Assessment  Assessment: Patient showing gradual progress but is still limited by soreness/msucle spasms of the left arm.  Limited terminal elbow extension and full supination limit functional use.  Plan to continue with strengthening and ROM.  Therapy Prognosis: Good    Treatment Plan :   Days: 2 times per week     Weeks: 8 weeks Total # of Visits Approved: 26 (10 additional with recertification)  Patient Education/HEP and Therapeutic Exercise    Goals  STG= LTG    Long Term Goals  Time Frame for Long Term Goals : 10 visit - expires 24  Long Term Goal 1: PROM elbow extension 0 deg  Long Term Goal 2: Strength left elbow flexion 4/5 to lift gallon of milk and to complete self-care tasks  Long Term Goal 3: Left  strength 70#  Long Term Goal 4: Finger dexterity and grasp to complete self- care and ADL's such as dressing    GALEN SHADE, PT    Date: 2024        ______________________________________ Date: 2024  Physician Signature

## 2024-06-04 ENCOUNTER — HOSPITAL ENCOUNTER (OUTPATIENT)
Dept: PHYSICAL THERAPY | Age: 39
Setting detail: THERAPIES SERIES
Discharge: HOME | End: 2024-06-04
Payer: COMMERCIAL

## 2024-06-04 PROCEDURE — 97110 THERAPEUTIC EXERCISES: CPT

## 2024-06-04 ASSESSMENT — PAIN SCALES - GENERAL: PAINLEVEL_OUTOF10: 4

## 2024-06-04 NOTE — PROGRESS NOTES
Phone: [phone]                 [facility]      Fax: [phone]                            Outpatient Physical Therapy                                                                            Daily Note    Date: 2024  Patient Name: Willis Evangelista        MRN: [id number]   ACCT#:  [id number]  : 1985  (38 y.o.)    Referring Provider (secondary): Dr. Yordy Sandra         Diagnosis: Left bicep tendon rupture with repain 3/4/24  Treatment Diagnosis: Left bicep repair;  weakness    Onset Date: 24  PT Insurance Information: CRS  Total # of Visits Approved: 26 (10 additional with recertification) Per Physician Order  Total # of Visits to Date: 17  No Show: 0  Canceled Appointment: 2  Plan of Care/Certification Expiration Date: 24    Pre-Treatment Pain:  4/10     Assessment  Assessment: Patient continues to note L elbow soreness. Patient followed up with Dr. Sandra yesterday and he told patient the muscle spams will take time to go away just like the cracking in his elbow. Dr. Sandra prescribed patient a steroid to help with soreness and tightness also. Progressed resistance and reps with exercises this afternoon. R hand  strength = 40, 25, and 30 pounds this afternoon. Post session patient notes soreness remains the same.    Plan  Continue with current plan of care    Exercises/Modalities/Manual:  See DocFlow Sheet    Education: HEP     Goals  (Total # of Visits to Date: 17)   Short Term Goals  Time Frame for Short Term Goals: -  Short Term Goal 1: STG= LTG  Short Term Goal 2: -    Long Term Goals  Time Frame for Long Term Goals : 10 visit - expires 24  Long Term Goal 1: PROM elbow extension 0 deg  Long Term Goal 2: Strength left elbow flexion 4/5 to lift gallon of milk and to complete self-care tasks  Long Term Goal 3: Left  strength 70#  Long Term Goal 4: Finger dexterity and grasp to complete self- care and ADL's such as dressing    Post Treatment Pain:

## 2024-06-07 ENCOUNTER — HOSPITAL ENCOUNTER (OUTPATIENT)
Dept: PHYSICAL THERAPY | Age: 39
Setting detail: THERAPIES SERIES
Discharge: HOME | End: 2024-06-07
Payer: COMMERCIAL

## 2024-06-07 PROCEDURE — 97110 THERAPEUTIC EXERCISES: CPT

## 2024-06-07 ASSESSMENT — PAIN SCALES - GENERAL: PAINLEVEL_OUTOF10: 1

## 2024-06-11 ENCOUNTER — HOSPITAL ENCOUNTER (OUTPATIENT)
Dept: PHYSICAL THERAPY | Age: 39
Setting detail: THERAPIES SERIES
Discharge: HOME | End: 2024-06-11
Payer: COMMERCIAL

## 2024-06-11 PROCEDURE — 97110 THERAPEUTIC EXERCISES: CPT

## 2024-06-11 ASSESSMENT — PAIN SCALES - GENERAL: PAINLEVEL_OUTOF10: 1

## 2024-06-11 NOTE — PROGRESS NOTES
Phone: [phone]                 [facility]      Fax: [phone]                            Outpatient Physical Therapy                                                                            Daily Note    Date: 2024  Patient Name: Willis Evangelista        MRN: [id number]   ACCT#:  [id number]  : 1985  (38 y.o.)    Referring Provider (secondary): Dr. Yordy Sandra         Diagnosis: Left bicep tendon rupture with repain 3/4/24  Treatment Diagnosis: Left bicep repair;  weakness    Onset Date: 24  PT Insurance Information: CRS  Total # of Visits Approved: 26 (10 additional with recertification) Per Physician Order  Total # of Visits to Date: 18  No Show: 0  Canceled Appointment: 2  Plan of Care/Certification Expiration Date: 24    Pre-Treatment Pain:  1/10     Assessment  Assessment: Patient continues to report soreness this afternoon. Patient continues to increase use of L UE around the house. Progressed resistance with  strength exercises this afternoon. Patient reports no increased pain with this. L elbow PROM flex = 11 degFN and AROM supination = 75 deg. Educated patient on elbow hangs to improve elbow extension.    Plan  Continue with current plan of care    Exercises/Modalities/Manual:  See DocFlow Sheet    Education: HEP     Goals  (Total # of Visits to Date: 18)   Short Term Goals  Time Frame for Short Term Goals: -  Short Term Goal 1: STG= LTG  Short Term Goal 2: -    Long Term Goals  Time Frame for Long Term Goals : 10 visit (26 total) - expires 24  Long Term Goal 1: PROM elbow extension 0 deg  Long Term Goal 2: Strength left elbow flexion 4/5 to lift gallon of milk and to complete self-care tasks  Long Term Goal 3: Left  strength 70#  Long Term Goal 4: Finger dexterity and grasp to complete self- care and ADL's such as dressing    Post Treatment Pain:  1/10    Time In: 1302    Time Out : 1342   Timed Code Treatment Minutes: 40 Minutes  Total Treatment

## 2024-06-13 ENCOUNTER — HOSPITAL ENCOUNTER (OUTPATIENT)
Dept: PHYSICAL THERAPY | Age: 39
Setting detail: THERAPIES SERIES
Discharge: HOME | End: 2024-06-13
Payer: COMMERCIAL

## 2024-06-13 NOTE — PROGRESS NOTES
Physical Therapy  [facility]  Rehab and Wellness    Date: 2024  Patient Name: Willis GLORIA Jean Carlos        : 1985       Patient is not feeling well today and will not be able to make this appointment.  He will return on the next appointment        Venecia Anand Date: 2024

## 2024-06-18 ENCOUNTER — HOSPITAL ENCOUNTER (OUTPATIENT)
Dept: PHYSICAL THERAPY | Age: 39
Setting detail: THERAPIES SERIES
Discharge: HOME | End: 2024-06-18
Payer: COMMERCIAL

## 2024-06-18 PROCEDURE — 97110 THERAPEUTIC EXERCISES: CPT

## 2024-06-18 ASSESSMENT — PAIN SCALES - GENERAL: PAINLEVEL_OUTOF10: 1

## 2024-06-18 NOTE — PROGRESS NOTES
Phone: [phone]                 [facility]      Fax: [phone]                            Outpatient Physical Therapy                                                                            Daily Note    Date: 2024  Patient Name: Willis Evangelista        MRN: [id number]   ACCT#:  [id number]  : 1985  (38 y.o.)    Referring Provider (secondary): Dr. Yordy Sandra         Diagnosis: Left bicep tendon rupture with repain 3/4/24  Treatment Diagnosis: Left bicep repair;  weakness    Onset Date: 24  PT Insurance Information: CRS  Total # of Visits Approved: 26 (10 additional with recertification) Per Physician Order  Total # of Visits to Date: 19  No Show: 0  Canceled Appointment: 2  Plan of Care/Certification Expiration Date: 24    Pre-Treatment Pain:  1/10     Assessment  Assessment: Patient continues to note distal bicep soreness. Continued with shoulder and elbow strengthening exercises per flowsheet. L elbow PROM ext = 8 degFN. Pinch  strength = 18, 17, and 16 pounds.  strength also improved from last measurement; today was 57, 50, and 45 pounds. Post session patient notes no change in soreness. Will continue to progress as able.    Plan  Continue with current plan of care    Exercises/Modalities/Manual:  See DocFlow Sheet    Education: HEP     Goals  (Total # of Visits to Date: 19)   Short Term Goals  Time Frame for Short Term Goals: -  Short Term Goal 1: STG= LTG  Short Term Goal 2: -    Long Term Goals  Time Frame for Long Term Goals : 10 visit (26 total) - expires 24  Long Term Goal 1: PROM elbow extension 0 deg  Long Term Goal 2: Strength left elbow flexion 4/5 to lift gallon of milk and to complete self-care tasks  Long Term Goal 3: Left  strength 70#  Long Term Goal 4: Finger dexterity and grasp to complete self- care and ADL's such as dressing    Post Treatment Pain:  1/10    Time In: 1331    Time Out : 1416   Timed Code Treatment Minutes: 45

## 2024-06-21 ENCOUNTER — HOSPITAL ENCOUNTER (OUTPATIENT)
Dept: PHYSICAL THERAPY | Age: 39
Setting detail: THERAPIES SERIES
Discharge: HOME | End: 2024-06-21
Payer: COMMERCIAL

## 2024-06-21 PROCEDURE — 97110 THERAPEUTIC EXERCISES: CPT

## 2024-06-21 ASSESSMENT — PAIN SCALES - GENERAL: PAINLEVEL_OUTOF10: 1

## 2024-06-21 NOTE — PROGRESS NOTES
Phone: [phone]                 [facility]      Fax: [phone]                            Outpatient Physical Therapy                                                                            Daily Note    Date: 2024  Patient Name: Willis Evangelista        MRN: [id number]   ACCT#:  [id number]  : 1985  (38 y.o.)    Referring Provider (secondary): Dr. Yordy Sandra         Diagnosis: Left bicep tendon rupture with repain 3/4/24  Treatment Diagnosis: Left bicep repair;  weakness    Onset Date: 24  PT Insurance Information: CRS  Total # of Visits Approved: 26 (10 additional with recertification) Per Physician Order  Total # of Visits to Date: 20  No Show: 0  Canceled Appointment: 2  Plan of Care/Certification Expiration Date: 24    Pre-Treatment Pain:  1/10     Assessment  Assessment: Patient states L bicep continues to be sore. Progressed  and pinch  resistance on Primus again this afternoon. Completed remaining shoulder and elbow strengthening exercises with good tolerance from patient. L elbow PROM ext = 8 degFN.    Plan  Continue with current plan of care    Exercises/Modalities/Manual:  See DocFlow Sheet    Education: HEP     Goals  (Total # of Visits to Date: 20)   Short Term Goals  Time Frame for Short Term Goals: -  Short Term Goal 1: STG= LTG  Short Term Goal 2: -    Long Term Goals  Time Frame for Long Term Goals : 10 visit (26 total) - expires 24  Long Term Goal 1: PROM elbow extension 0 deg  Long Term Goal 2: Strength left elbow flexion 4/5 to lift gallon of milk and to complete self-care tasks  Long Term Goal 3: Left  strength 70#  Long Term Goal 4: Finger dexterity and grasp to complete self- care and ADL's such as dressing    Post Treatment Pain:  1/10    Time In: 1252    Time Out : 1332   Timed Code Treatment Minutes: 40 Minutes  Total Treatment Time: 40 Minutes    Thien Mallory PTA     Date: 2024

## 2024-06-25 ENCOUNTER — HOSPITAL ENCOUNTER (OUTPATIENT)
Dept: PHYSICAL THERAPY | Age: 39
Setting detail: THERAPIES SERIES
Discharge: HOME | End: 2024-06-25
Payer: COMMERCIAL

## 2024-06-25 NOTE — PROGRESS NOTES
Physical Therapy  [facility]  Rehab and Wellness    Date: 2024  Patient Name: Willis FAIZAN Jean Carlos        : 1985       Patient called and is not able to make his appointment, he will return on the next visit.    Venecia TRUONG Shock Date: 2024

## 2024-06-27 ENCOUNTER — HOSPITAL ENCOUNTER (OUTPATIENT)
Dept: PHYSICAL THERAPY | Age: 39
Setting detail: THERAPIES SERIES
Discharge: HOME | End: 2024-06-27
Payer: COMMERCIAL

## 2024-06-27 NOTE — PROGRESS NOTES
Physical Therapy  [facility]  Rehab and Wellness    Date: 2024  Patient Name: Bellodebbi Evangelista        : 1985       Called and stated that he is going to be done with his therapy.  No reason given.      Venecia S Shock Date: 2024

## 2024-08-05 ENCOUNTER — HOSPITAL ENCOUNTER (OUTPATIENT)
Dept: GENERAL RADIOLOGY | Age: 39
Discharge: HOME | End: 2024-08-07
Payer: COMMERCIAL

## 2024-08-05 ENCOUNTER — HOSPITAL ENCOUNTER (OUTPATIENT)
Age: 39
Discharge: HOME | End: 2024-08-07
Payer: COMMERCIAL

## 2024-08-05 DIAGNOSIS — S46.212D BICEPS STRAIN, LEFT, SUBSEQUENT ENCOUNTER: ICD-10-CM

## 2024-08-05 PROCEDURE — 73080 X-RAY EXAM OF ELBOW: CPT

## 2024-08-08 ENCOUNTER — TELEPHONE (OUTPATIENT)
Dept: FAMILY MEDICINE CLINIC | Age: 39
End: 2024-08-08

## 2024-08-08 DIAGNOSIS — Z13.220 SCREENING FOR HYPERLIPIDEMIA: ICD-10-CM

## 2024-08-08 DIAGNOSIS — E88.819 INSULIN RESISTANCE: Primary | ICD-10-CM

## 2024-08-08 NOTE — TELEPHONE ENCOUNTER
Willis canceled his appointment for this afternoon. He is wanting to know if Dr. MARCIAL could order labs for him to have done and then he will reschedule his appointment. Please let Willis know.      Health Maintenance   Topic Date Due    Hepatitis B vaccine (1 of 3 - 3-dose series) Never done    COVID-19 Vaccine (1) Never done    Varicella vaccine (1 of 2 - 2-dose childhood series) Never done    HIV screen  Never done    Hepatitis C screen  Never done    Depression Screen  05/25/2024    Flu vaccine (1) 08/01/2024    DTaP/Tdap/Td vaccine (2 - Td or Tdap) 12/20/2029    Pneumococcal 0-64 years Vaccine (3 of 3 - PPSV23 or PCV20) 11/04/2050    Hepatitis A vaccine  Aged Out    Hib vaccine  Aged Out    HPV vaccine  Aged Out    Polio vaccine  Aged Out    Meningococcal (ACWY) vaccine  Aged Out             (applicable per patient's age: Cancer Screenings, Depression Screening, Fall Risk Screening, Immunizations)    AST (U/L)   Date Value   05/28/2022 22     ALT (U/L)   Date Value   05/28/2022 39     BUN (mg/dL)   Date Value   02/29/2024 14      (goal A1C is < 7)   (goal LDL is <100) need 30-50% reduction from baseline     BP Readings from Last 3 Encounters:   03/04/24 114/75   02/07/24 (!) 162/96   12/04/23 (!) 150/100    (goal /80)      All Future Testing planned in [facility]:  Lab Frequency Next Occurrence       Next Visit Date:  Future Appointments   Date Time Provider Department Center   8/8/2024  2:40 PM Lucas Toro DO [facility] DE[facility]   8/13/2024  8:40 AM Lucas Toro DO [facility]            Patient Active Problem List:     Asymptomatic varicose veins of both lower extremities     Venous stasis dermatitis of both lower extremities     Hemosiderin pigmentation of skin     Insulin resistance     Folliculitis

## 2024-08-16 ENCOUNTER — HOSPITAL ENCOUNTER (OUTPATIENT)
Age: 39
Discharge: HOME | End: 2024-08-16
Attending: STUDENT IN AN ORGANIZED HEALTH CARE EDUCATION/TRAINING PROGRAM
Payer: COMMERCIAL

## 2024-08-16 ENCOUNTER — HOSPITAL ENCOUNTER (OUTPATIENT)
Dept: MRI IMAGING | Age: 39
End: 2024-08-16
Attending: STUDENT IN AN ORGANIZED HEALTH CARE EDUCATION/TRAINING PROGRAM
Payer: COMMERCIAL

## 2024-08-16 DIAGNOSIS — Z13.220 SCREENING FOR HYPERLIPIDEMIA: ICD-10-CM

## 2024-08-16 DIAGNOSIS — S46.212D STRAIN OF MUSCLE, FASCIA AND TENDON OF OTHER PARTS OF BICEPS, LEFT ARM, SUBSEQUENT ENCOUNTER: ICD-10-CM

## 2024-08-16 DIAGNOSIS — E88.819 INSULIN RESISTANCE: ICD-10-CM

## 2024-08-16 LAB
ANION GAP SERPL CALCULATED.3IONS-SCNC: 13 MMOL/L (ref 9–17)
BASOPHILS # BLD: ABNORMAL K/UL (ref 0–0.2)
BASOPHILS NFR BLD: ABNORMAL % (ref 0–2)
BUN SERPL-MCNC: 11 MG/DL (ref 6–20)
BUN/CREAT SERPL: 14 (ref 9–20)
CALCIUM SERPL-MCNC: 10.2 MG/DL (ref 8.6–10.4)
CHLORIDE SERPL-SCNC: 98 MMOL/L (ref 98–107)
CHOLEST SERPL-MCNC: 274 MG/DL (ref 0–199)
CHOLESTEROL/HDL RATIO: 6
CO2 SERPL-SCNC: 25 MMOL/L (ref 20–31)
CREAT SERPL-MCNC: 0.8 MG/DL (ref 0.7–1.2)
EOSINOPHIL # BLD: ABNORMAL K/UL (ref 0–0.4)
EOSINOPHILS RELATIVE PERCENT: ABNORMAL % (ref 0–5)
ERYTHROCYTE [DISTWIDTH] IN BLOOD BY AUTOMATED COUNT: 12.7 % (ref 12.1–15.2)
GFR, ESTIMATED: >90 ML/MIN/1.73M2
GLUCOSE SERPL-MCNC: 88 MG/DL (ref 70–99)
HCT VFR BLD AUTO: 47.6 % (ref 41–53)
HDLC SERPL-MCNC: 44 MG/DL
HGB BLD-MCNC: 16.1 G/DL (ref 13.5–17.5)
IMM GRANULOCYTES # BLD AUTO: ABNORMAL K/UL (ref 0–0.3)
IMM GRANULOCYTES NFR BLD: ABNORMAL %
LDLC SERPL CALC-MCNC: 168 MG/DL (ref 0–100)
LYMPHOCYTES NFR BLD: 5.15 K/UL (ref 1–4.8)
LYMPHOCYTES RELATIVE PERCENT: 39 % (ref 13–44)
MCH RBC QN AUTO: 30.8 PG (ref 26–34)
MCHC RBC AUTO-ENTMCNC: 33.8 G/DL (ref 31–37)
MCV RBC AUTO: 91 FL (ref 80–100)
MONOCYTES NFR BLD: 0.92 K/UL (ref 0–1)
MONOCYTES NFR BLD: 7 % (ref 5–9)
MORPHOLOGY: ABNORMAL
NEUTROPHILS NFR BLD: 54 % (ref 39–75)
NEUTS SEG NFR BLD: 7.13 K/UL (ref 2.1–6.5)
PLATELET # BLD AUTO: 223 K/UL (ref 140–450)
PMV BLD AUTO: 11.4 FL (ref 6–12)
POTASSIUM SERPL-SCNC: 4.5 MMOL/L (ref 3.7–5.3)
RBC # BLD AUTO: 5.23 M/UL (ref 4.5–5.9)
SODIUM SERPL-SCNC: 136 MMOL/L (ref 135–144)
TRIGL SERPL-MCNC: 315 MG/DL
VLDLC SERPL CALC-MCNC: 63 MG/DL
WBC OTHER # BLD: 13.2 K/UL (ref 3.5–11)

## 2024-08-16 PROCEDURE — 83036 HEMOGLOBIN GLYCOSYLATED A1C: CPT

## 2024-08-16 PROCEDURE — 80048 BASIC METABOLIC PNL TOTAL CA: CPT

## 2024-08-16 PROCEDURE — 80061 LIPID PANEL: CPT

## 2024-08-16 PROCEDURE — 36415 COLL VENOUS BLD VENIPUNCTURE: CPT

## 2024-08-16 PROCEDURE — 85025 COMPLETE CBC W/AUTO DIFF WBC: CPT

## 2024-08-17 LAB
EST. AVERAGE GLUCOSE BLD GHB EST-MCNC: 126 MG/DL
HBA1C MFR BLD: 6 % (ref 4–6)

## 2024-08-19 ENCOUNTER — HOSPITAL ENCOUNTER (OUTPATIENT)
Dept: MRI IMAGING | Age: 39
Discharge: HOME | End: 2024-08-21
Attending: STUDENT IN AN ORGANIZED HEALTH CARE EDUCATION/TRAINING PROGRAM
Payer: COMMERCIAL

## 2024-08-19 PROCEDURE — 73221 MRI JOINT UPR EXTREM W/O DYE: CPT

## 2024-08-20 ENCOUNTER — OFFICE VISIT (OUTPATIENT)
Dept: FAMILY MEDICINE CLINIC | Age: 39
End: 2024-08-20
Payer: COMMERCIAL

## 2024-08-20 VITALS
WEIGHT: 315 LBS | SYSTOLIC BLOOD PRESSURE: 136 MMHG | HEART RATE: 78 BPM | OXYGEN SATURATION: 98 % | BODY MASS INDEX: 56.58 KG/M2 | DIASTOLIC BLOOD PRESSURE: 78 MMHG

## 2024-08-20 DIAGNOSIS — R06.81 WITNESSED APNEIC SPELLS: ICD-10-CM

## 2024-08-20 DIAGNOSIS — F17.210 CIGARETTE NICOTINE DEPENDENCE WITHOUT COMPLICATION: ICD-10-CM

## 2024-08-20 DIAGNOSIS — G47.19 EXCESSIVE DAYTIME SLEEPINESS: ICD-10-CM

## 2024-08-20 DIAGNOSIS — E78.2 MIXED HYPERLIPIDEMIA: ICD-10-CM

## 2024-08-20 DIAGNOSIS — R06.83 SNORING: ICD-10-CM

## 2024-08-20 DIAGNOSIS — E88.819 INSULIN RESISTANCE: Primary | ICD-10-CM

## 2024-08-20 PROCEDURE — G8427 DOCREV CUR MEDS BY ELIG CLIN: HCPCS | Performed by: STUDENT IN AN ORGANIZED HEALTH CARE EDUCATION/TRAINING PROGRAM

## 2024-08-20 PROCEDURE — G8417 CALC BMI ABV UP PARAM F/U: HCPCS | Performed by: STUDENT IN AN ORGANIZED HEALTH CARE EDUCATION/TRAINING PROGRAM

## 2024-08-20 PROCEDURE — 4004F PT TOBACCO SCREEN RCVD TLK: CPT | Performed by: STUDENT IN AN ORGANIZED HEALTH CARE EDUCATION/TRAINING PROGRAM

## 2024-08-20 PROCEDURE — 99214 OFFICE O/P EST MOD 30 MIN: CPT | Performed by: STUDENT IN AN ORGANIZED HEALTH CARE EDUCATION/TRAINING PROGRAM

## 2024-08-20 RX ORDER — VARENICLINE TARTRATE 0.5 MG/1
.5-1 TABLET, FILM COATED ORAL SEE ADMIN INSTRUCTIONS
Qty: 57 TABLET | Refills: 0 | Status: SHIPPED | OUTPATIENT
Start: 2024-08-20

## 2024-08-20 SDOH — ECONOMIC STABILITY: FOOD INSECURITY: WITHIN THE PAST 12 MONTHS, YOU WORRIED THAT YOUR FOOD WOULD RUN OUT BEFORE YOU GOT MONEY TO BUY MORE.: NEVER TRUE

## 2024-08-20 SDOH — ECONOMIC STABILITY: FOOD INSECURITY: WITHIN THE PAST 12 MONTHS, THE FOOD YOU BOUGHT JUST DIDN'T LAST AND YOU DIDN'T HAVE MONEY TO GET MORE.: NEVER TRUE

## 2024-08-20 SDOH — ECONOMIC STABILITY: INCOME INSECURITY: HOW HARD IS IT FOR YOU TO PAY FOR THE VERY BASICS LIKE FOOD, HOUSING, MEDICAL CARE, AND HEATING?: NOT HARD AT ALL

## 2024-08-20 ASSESSMENT — PATIENT HEALTH QUESTIONNAIRE - PHQ9
SUM OF ALL RESPONSES TO PHQ9 QUESTIONS 1 & 2: 0
SUM OF ALL RESPONSES TO PHQ QUESTIONS 1-9: 0
2. FEELING DOWN, DEPRESSED OR HOPELESS: NOT AT ALL
SUM OF ALL RESPONSES TO PHQ QUESTIONS 1-9: 0
1. LITTLE INTEREST OR PLEASURE IN DOING THINGS: NOT AT ALL

## 2024-08-20 ASSESSMENT — ENCOUNTER SYMPTOMS
DIARRHEA: 0
VOMITING: 0
ABDOMINAL PAIN: 0
NAUSEA: 0
BACK PAIN: 0
SINUS PAIN: 0
COUGH: 0
WHEEZING: 0
SORE THROAT: 0

## 2024-08-20 NOTE — PATIENT INSTRUCTIONS
SURVEY:    You may be receiving a [facility].    You may get this in the mail, through your MyChart or in your email.     Please complete the survey to enable us to provide the highest quality of care to you and your family.    If you cannot score us as very good ( 5 Stars) on any question, please feel free to call the office to discuss how we could have made your experience exceptional.     Thank you.    Clinical Care Team:  Dr. Lucas Toro, DO Viola Tovar LPN    Triage:  Laure Connor CMA    Clerical Team:  Laure Ngo

## 2024-08-20 NOTE — PROGRESS NOTES
pain.   Skin:  Negative for rash.   Hematological:  Negative for adenopathy.   Psychiatric/Behavioral:  Positive for sleep disturbance.            Physical Exam:     Vitals:  /78   Pulse 78   Wt (!) 154.2 kg (340 lb)   SpO2 98%   BMI 56.58 kg/m²       Physical Exam  Vitals and nursing note reviewed.   Constitutional:       General: He is not in acute distress.     Appearance: Normal appearance. He is obese.   Musculoskeletal:         General: No swelling or tenderness. Normal range of motion.   Skin:     General: Skin is warm and dry.      Capillary Refill: Capillary refill takes less than 2 seconds.   Neurological:      General: No focal deficit present.      Mental Status: He is alert and oriented to person, place, and time. Mental status is at baseline.   Psychiatric:         Mood and Affect: Mood normal.         Behavior: Behavior normal.         Thought Content: Thought content normal.                 Data:     Lab Results   Component Value Date/Time     08/16/2024 02:50 PM    K 4.5 08/16/2024 02:50 PM    CL 98 08/16/2024 02:50 PM    CO2 25 08/16/2024 02:50 PM    BUN 11 08/16/2024 02:50 PM    CREATININE 0.8 08/16/2024 02:50 PM    GLUCOSE 88 08/16/2024 02:50 PM    BILITOT 0.16 05/28/2022 07:15 PM    ALKPHOS 74 05/28/2022 07:15 PM    AST 22 05/28/2022 07:15 PM    ALT 39 05/28/2022 07:15 PM     Lab Results   Component Value Date/Time    WBC 13.2 08/16/2024 02:50 PM    RBC 5.23 08/16/2024 02:50 PM    RBC 5.29 05/02/2016 04:35 PM    HGB 16.1 08/16/2024 02:50 PM    HCT 47.6 08/16/2024 02:50 PM    MCV 91.0 08/16/2024 02:50 PM    MCH 30.8 08/16/2024 02:50 PM    MCHC 33.8 08/16/2024 02:50 PM    RDW 12.7 08/16/2024 02:50 PM     08/16/2024 02:50 PM    MPV 11.4 08/16/2024 02:50 PM     No results found for: \"TSH\"  Lab Results   Component Value Date/Time    CHOL 274 08/16/2024 02:50 PM     08/16/2024 02:50 PM    HDL 44 08/16/2024 02:50 PM    LABA1C 6.0 08/16/2024 02:50 PM          Assessment &

## 2024-08-28 ENCOUNTER — HOSPITAL ENCOUNTER (OUTPATIENT)
Dept: SLEEP CENTER | Age: 39
Discharge: HOME | End: 2024-08-28

## 2024-08-28 DIAGNOSIS — R06.83 SNORING: ICD-10-CM

## 2024-08-28 DIAGNOSIS — R06.81 WITNESSED APNEIC SPELLS: ICD-10-CM

## 2024-08-28 DIAGNOSIS — G47.19 EXCESSIVE DAYTIME SLEEPINESS: ICD-10-CM

## 2024-08-28 ASSESSMENT — SLEEP AND FATIGUE QUESTIONNAIRES
HOW OFTEN DO YOU SNORE: ALMOST DAILY
HAVE YOU EVER NODDED OFF OR FALLEN ASLEEP WHILE DRIVING: NO
USUAL AMOUNT OF TIME TO FALL ASLEEP (MIN): 1
WHAT TIME DO YOU USUALLY WAKE UP: 18000
HOW LIKELY ARE YOU TO NOD OFF OR FALL ASLEEP WHILE SITTING QUIETLY AFTER LUNCH WITHOUT ALCOHOL: WOULD NEVER DOZE
HOW LIKELY ARE YOU TO NOD OFF OR FALL ASLEEP WHILE SITTING AND READING: HIGH CHANCE OF DOZING
DO YOU HAVE HIGH BLOOD PRESSURE: YES
NUMBER OF TIMES YOU WAKE PER NIGHT: 2
WHAT TIME DO YOU USUALLY GO TO BED: 81000
HOW LIKELY ARE YOU TO NOD OFF OR FALL ASLEEP WHILE WATCHING TV: HIGH CHANCE OF DOZING
FUNCTION BEST IN: MORNING
ESS TOTAL SCORE: 10
HOW LIKELY ARE YOU TO NOD OFF OR FALL ASLEEP WHEN YOU ARE A PASSENGER IN A CAR FOR AN HOUR WITHOUT A BREAK: SLIGHT CHANCE OF DOZING
HOW LIKELY ARE YOU TO NOD OFF OR FALL ASLEEP WHILE SITTING INACTIVE IN A PUBLIC PLACE: WOULD NEVER DOZE
NORMAL AMOUNT OF SLEEP PER NIGHT: 6
HOW LIKELY ARE YOU TO NOD OFF OR FALL ASLEEP WHILE SITTING AND TALKING TO SOMEONE: WOULD NEVER DOZE
SNORING VOLUME: AS LOUD AS TALKING
ARE YOU TIRED DURING WAKE TIME: ALMOST DAILY
ARE YOU TIRED AFTER SLEEPING: ALMOST DAILY
DO YOU SNORE: YES
HAS ANYONE NOTICED THAT YOU QUIT BREATHING DURING SLEEP: ALMOST DAILY
HOW LIKELY ARE YOU TO NOD OFF OR FALL ASLEEP WHILE LYING DOWN TO REST IN THE AFTERNOON WHEN CIRCUMSTANCES PERMIT: HIGH CHANCE OF DOZING
HOW LIKELY ARE YOU TO NOD OFF OR FALL ASLEEP IN A CAR, WHILE STOPPED FOR A FEW MINUTES IN TRAFFIC: WOULD NEVER DOZE
DOES YOUR SNORING BOTHER OTHERS: YES
HOW MANY NAPS DO YOU TAKE PER WEEK: 6

## 2024-08-29 VITALS — WEIGHT: 315 LBS | HEIGHT: 65 IN | BODY MASS INDEX: 52.48 KG/M2

## 2024-08-29 NOTE — PROGRESS NOTES
Patient arrived for home sleep testing. Testing explained, all questions answered, pt voices understanding.   Pt signed out unit #7 and will return tomorrow to rehab

## 2024-09-05 DIAGNOSIS — G47.33 OSA (OBSTRUCTIVE SLEEP APNEA): Primary | ICD-10-CM

## 2024-09-05 LAB — STATUS: NORMAL

## 2024-09-16 ENCOUNTER — OFFICE VISIT (OUTPATIENT)
Dept: FAMILY MEDICINE CLINIC | Age: 39
End: 2024-09-16
Payer: COMMERCIAL

## 2024-09-16 VITALS
HEART RATE: 104 BPM | SYSTOLIC BLOOD PRESSURE: 130 MMHG | DIASTOLIC BLOOD PRESSURE: 88 MMHG | OXYGEN SATURATION: 95 % | WEIGHT: 315 LBS | BODY MASS INDEX: 56.41 KG/M2

## 2024-09-16 DIAGNOSIS — G47.33 OSA (OBSTRUCTIVE SLEEP APNEA): ICD-10-CM

## 2024-09-16 DIAGNOSIS — J06.9 VIRAL URI: ICD-10-CM

## 2024-09-16 DIAGNOSIS — R09.81 NASAL CONGESTION: ICD-10-CM

## 2024-09-16 DIAGNOSIS — R05.9 COUGH IN ADULT: ICD-10-CM

## 2024-09-16 DIAGNOSIS — F17.210 CIGARETTE NICOTINE DEPENDENCE WITHOUT COMPLICATION: Primary | ICD-10-CM

## 2024-09-16 PROCEDURE — G8417 CALC BMI ABV UP PARAM F/U: HCPCS | Performed by: STUDENT IN AN ORGANIZED HEALTH CARE EDUCATION/TRAINING PROGRAM

## 2024-09-16 PROCEDURE — 4004F PT TOBACCO SCREEN RCVD TLK: CPT | Performed by: STUDENT IN AN ORGANIZED HEALTH CARE EDUCATION/TRAINING PROGRAM

## 2024-09-16 PROCEDURE — G8427 DOCREV CUR MEDS BY ELIG CLIN: HCPCS | Performed by: STUDENT IN AN ORGANIZED HEALTH CARE EDUCATION/TRAINING PROGRAM

## 2024-09-16 PROCEDURE — 99214 OFFICE O/P EST MOD 30 MIN: CPT | Performed by: STUDENT IN AN ORGANIZED HEALTH CARE EDUCATION/TRAINING PROGRAM

## 2024-09-16 RX ORDER — VARENICLINE TARTRATE 1 MG/1
1 TABLET, FILM COATED ORAL 2 TIMES DAILY
Qty: 180 TABLET | Refills: 1 | Status: SHIPPED | OUTPATIENT
Start: 2024-09-16

## 2024-09-16 RX ORDER — BENZONATATE 100 MG/1
100-200 CAPSULE ORAL 3 TIMES DAILY PRN
Qty: 60 CAPSULE | Refills: 0 | Status: SHIPPED | OUTPATIENT
Start: 2024-09-16 | End: 2024-09-23

## 2024-09-16 ASSESSMENT — ENCOUNTER SYMPTOMS
DIARRHEA: 0
ABDOMINAL PAIN: 0
VOMITING: 0
RHINORRHEA: 1
COUGH: 1
NAUSEA: 0
SINUS PAIN: 0
SINUS PRESSURE: 1
SORE THROAT: 1
BACK PAIN: 0
WHEEZING: 0

## 2025-02-27 NOTE — PROGRESS NOTES
PLAN:      Assessment & Plan  1. Resistant hypertension.  His blood pressure remains elevated despite current medications, indicating resistant hypertension. He has experienced side effects with spironolactone, amlodipine, and lisinopril, but is tolerating Bystolic and losartan well. Chlorthalidone will be added to his regimen. He is advised to discontinue spironolactone and start chlorthalidone tonight. He should take all his medications tomorrow before his cardiology appointment. An EKG will be performed to rule out any cardiac blockages. He is instructed to use [employer] for communication regarding any issues.    2. Hypercholesterolemia.  He is currently taking atorvastatin for cholesterol management and reports no issues with this medication. Labs will be rechecked in April around the 17th to monitor his cholesterol levels.    3. Asthma.  His asthma is well-controlled with no recent issues.    4. Health maintenance.  He is due for a tetanus shot and will receive the Tdap vaccine today, which includes a pertussis booster.    Problem List Items Addressed This Visit       Mild intermittent asthma (Chronic)    Essential hypertension - Primary (Chronic)    Start chlorthalidone.  Continue losartan and Bystolic.  Monitor labs in four weeks.  Patient will see Cardiology tomorrow.  Counseled on importance of hypertension disease course, I recommend ongoing Education for DASH-diet and exercise.  Counseled on medication regimen importance of treating high blood pressure.  Please be advised of risk of untreated blood pressure as discussed.  Please advised of ER precautions were given for symptoms of hypertensive urgency and emergency.           Relevant Medications    chlorthalidone (HYGROTEN) 25 MG Tab    Other Relevant Orders    CBC Without Differential    Comprehensive Metabolic Panel    TSH    Hemoglobin A1C    Lipid Panel    Encounter for long-term (current) use of medications (Chronic)    Complete history and physical  Phone: [phone]                 [facility]      Fax: [phone]                            Outpatient Physical Therapy                                                                            Daily Note    Date: 2024  Patient Name: Willis Evangelista        MRN: [id number]   ACCT#:  [id number]  : 1985  (38 y.o.)    Referring Provider (secondary): Dr. Yordy Sandra         Diagnosis: Left bicep tendon rupture with repain 3/4/24  Treatment Diagnosis: Left bicep repair;  weakness    Onset Date: 24  PT Insurance Information: CRS  Total # of Visits Approved: 26 (10 additional with recertification) Per Physician Order  Total # of Visits to Date: 17  No Show: 0  Canceled Appointment: 2  Plan of Care/Certification Expiration Date: 24    Pre-Treatment Pain:  1/10     Assessment  Assessment: Patient reports general soreness.  Is attempting to increase use of LUE with daily tasks.  MD pleased with progress and increased lifting limit to 20#.  End range tightness full supination and lacking 10 deg elbow exension    Plan  Continue with current plan of care    Exercises/Modalities/Manual:  See DocFlow Sheet    Education:           Goals  (Total # of Visits to Date: 17)   Short Term Goals  Time Frame for Short Term Goals: -  Short Term Goal 1: STG= LTG  Short Term Goal 2: -    Long Term Goals  Time Frame for Long Term Goals : 10 visit (26 total) - expires 24  Long Term Goal 1: PROM elbow extension 0 deg  Long Term Goal 2: Strength left elbow flexion 4/5 to lift gallon of milk and to complete self-care tasks  Long Term Goal 3: Left  strength 70#  Long Term Goal 4: Finger dexterity and grasp to complete self- care and ADL's such as dressing    Post Treatment Pain:  1/10    Time In: 1300    Time Out : 1345        Timed Code Treatment Minutes: 40 Minutes  Total Treatment Time: 45 Minutes    GALEN ROCHA PT     Date: 2024 1   was completed today.  Complete and thorough medication reconciliation was performed.  Discussed risks and benefits of medications.  Advised patient on orders and health maintenance.  We discussed old records and old labs if available.  Will request any records not available through epic.  Continue current medications listed on your summary sheet.           Relevant Orders    CBC Without Differential    Comprehensive Metabolic Panel    TSH    Hemoglobin A1C    Lipid Panel    Hyperlipidemia LDL goal <100 (Chronic)    Establish care with Cardiology continue atorvastatin.Counseled on hyperlipidemia disease course, healthy diet and increased need for exercise.  Please be advised of the risk of cardiovascular disease, increase stroke and heart attack risk with uncontrolled/untreated hyperlipidemia.     Patient voiced understanding and understood the treatment plan. All questions were answered.            Relevant Orders    CBC Without Differential    Comprehensive Metabolic Panel    TSH    Hemoglobin A1C    Lipid Panel    Metabolic syndrome (Chronic)    Monitoring risk factors.  See problems.         Relevant Orders    CBC Without Differential    Comprehensive Metabolic Panel    TSH    Hemoglobin A1C    Lipid Panel     Other Visit Diagnoses         Need for vaccination        Relevant Medications    DIPH,PERTUSS(ACEL),TET VAC(PF)(ADULT)(ADACEL)(TDaP)        Discussed risk and benefits of Tdap.  Patient agrees to proceed.  Future Appointments       Date Provider Specialty Appt Notes    2/28/2025  Cardiology susi    2/28/2025 Rubin Araya MD Cardiology Essential hypertension    4/8/2025  Lab lab           Medication Management for assessment above:   Medication List with Changes/Refills   New Medications    CHLORTHALIDONE (HYGROTEN) 25 MG TAB    Take 1 tablet (25 mg total) by mouth once daily.   Current Medications    ALBUTEROL (PROVENTIL/VENTOLIN HFA) 90 MCG/ACTUATION INHALER    Inhale 2 puffs into the lungs every 6  (six) hours as needed for Wheezing. PRN    ASPIRIN (ECOTRIN) 81 MG EC TABLET    Take 1 tablet (81 mg total) by mouth once daily.    ATORVASTATIN (LIPITOR) 40 MG TABLET    Take 1 tablet (40 mg total) by mouth once daily.    LOSARTAN (COZAAR) 100 MG TABLET    Take 1 tablet (100 mg total) by mouth once daily.    NEBIVOLOL (BYSTOLIC) 20 MG TAB    Take 1 tablet (20 mg total) by mouth once daily.    TADALAFIL (CIALIS) 20 MG TAB    Take 1 tablet (20 mg total) by mouth once daily.   Discontinued Medications    SPIRONOLACTONE (ALDACTONE) 25 MG TABLET    Take 1 tablet (25 mg total) by mouth once daily.       Rubin Serrano M.D.  ==========================================================================  Subjective:   Patient ID: Todd Mejia is a 55 y.o. male.  has a past medical history of Asthma, Asthma with allergic rhinitis (10/31/2013), Epilepsy, Essential hypertension (01/26/2022), and Seizure disorder.   Chief Complaint: Follow-up      History of Present Illness  The patient is a 55-year-old male who presents for a hypertension follow-up.    He reports an initial episode of severe hypertension, the cause of which remains unknown to him. He has been on various antihypertensive medications, including Bystolic, and his blood pressure is elevated today. He discontinued spironolactone due to adverse effects, describing a sensation akin to a fatty substance within his knee. Despite attempts to contact the office, he was unable to reach us. He also experienced tachycardia following the consumption of caffeine-containing products. He recalls initiating spironolactone therapy on 01/31/2025, as recommended during a blood pressure check, but had to discontinue it due to the aforementioned side effects. He has a scheduled appointment with Dr. Perez tomorrow and has undergone an EKG. He has previously tried amlodipine, which resulted in complications, and lisinopril, which induced a cough. Currently, he is tolerating losartan  well without any associated cough. He has not previously been prescribed chlorthalidone.    He is currently on atorvastatin for cholesterol management, initiated approximately a month ago, and reports no issues with this medication. He is making dietary modifications, including reducing sugar intake and monitoring carbohydrate consumption. He has a family history of high sugar consumption on his maternal side. 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 He does not require any medication refills at this time.    His asthma is well-controlled, and he reports no current issues.    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    FAMILY HISTORY  His grandfather and uncle on his mother's side ate a lot of candy.    MEDICATIONS  Current: Bystolic, losartan, atorvastatin  Discontinued: spironolactone, amlodipine, lisinopril    Problem List Items Addressed This Visit       Mild intermittent asthma (Chronic)    Overview   - Chronic, stable  - Reports no recent flares. Denies wheezing, shortness of breath  - occasional smoker, smokes cigars sparingly   - Uses albuterol inhaler PRN for rescue          Essential hypertension - Primary (Chronic)    Overview   February 2025:  Patient here today to follow-up on high blood pressure his blood pressure is still elevated reports improvement with losartan med Bystolic could not take spironolactone due to a bad reaction he had with his head burning.  Patient has also had reactions to amlodipine and cough for lisinopril.  Patient has a appointment with Cardiology tomorrow.  He does have family history of heart disease.  January 2025:  Uncontrolled blood pressure reading on Bystolic 10 milligrams.  Losartan 100.  Hypertension Medications               losartan (COZAAR) 100 MG tablet Take 1 tablet (100 mg total) by mouth once daily.    nebivoloL (BYSTOLIC) 20 mg Tab Take 1 tablet (20 mg total) by mouth once daily.        CHRONIC. STABLE. BP Reviewed.  Compliant with BP  medications. No SE reported.   (-) CP, SOB, palpitations, dizziness, lightheadedness, HA, arm numbness, tingling or weakness, syncope.  Creatinine   Date Value Ref Range Status   01/17/2025 1.1 0.5 - 1.4 mg/dL Final     BP Readings from Last 3 Encounters:   02/27/25 (!) 158/94   01/31/25 (!) 158/92   01/16/25 (!) 166/100   No results found for this or any previous visit.           Current Assessment & Plan   Start chlorthalidone.  Continue losartan and Bystolic.  Monitor labs in four weeks.  Patient will see Cardiology tomorrow.  Counseled on importance of hypertension disease course, I recommend ongoing Education for DASH-diet and exercise.  Counseled on medication regimen importance of treating high blood pressure.  Please be advised of risk of untreated blood pressure as discussed.  Please advised of ER precautions were given for symptoms of hypertensive urgency and emergency.           Encounter for long-term (current) use of medications (Chronic)    Overview   February 2025:  Reviewed labs.  January 2025:  Reviewed labs.  March 2023: Reviewed labs.  Patient is on CHRONIC long-term drug therapy for managed conditions. See medication list. Reports compliance.  No side effects reported.  Routine lab work is being monitored.  Patient does need refills today.     Lab Results   Component Value Date    WBC 5.98 01/17/2025    HGB 16.6 01/17/2025    HCT 49.9 01/17/2025    MCV 93 01/17/2025     01/17/2025         Chemistry        Component Value Date/Time     01/17/2025 0815    K 4.3 01/17/2025 0815     01/17/2025 0815    CO2 25 01/17/2025 0815    BUN 12 01/17/2025 0815    CREATININE 1.1 01/17/2025 0815    GLU 87 01/17/2025 0815        Component Value Date/Time    CALCIUM 9.0 01/17/2025 0815    ALKPHOS 63 01/17/2025 0815    AST 34 01/17/2025 0815    ALT 38 01/17/2025 0815    BILITOT 1.4 (H) 01/17/2025 0815    ESTGFRAFRICA >60.0 02/02/2022 0933    EGFRNONAA >60.0 02/02/2022 0933        Lab Results    Component Value Date    TSH 1.791 01/17/2025            Current Assessment & Plan   Complete history and physical was completed today.  Complete and thorough medication reconciliation was performed.  Discussed risks and benefits of medications.  Advised patient on orders and health maintenance.  We discussed old records and old labs if available.  Will request any records not available through epic.  Continue current medications listed on your summary sheet.           Hyperlipidemia LDL goal <100 (Chronic)    Overview   February 2025:  Reviewed labs.  Patient reports that he has been tolerating atorvastatin 40 milligrams daily.  No side effects reported.  January 2025:     CHRONIC.  Uncontrolled. Lab analysis reviewed.   (-) CP, SOB, abdominal pain, N/V/D, constipation, jaundice, skin changes.  (-) Myalgias  Lab Results   Component Value Date    CHOL 177 01/17/2025    CHOL 178 06/23/2023    CHOL 177 03/10/2023     Lab Results   Component Value Date    HDL 40 01/17/2025    HDL 41 06/23/2023    HDL 43 03/10/2023     Lab Results   Component Value Date    LDLCALC 123.4 01/17/2025    LDLCALC 120.4 06/23/2023    LDLCALC 123.0 03/10/2023     Lab Results   Component Value Date    TRIG 68 01/17/2025    TRIG 83 06/23/2023    TRIG 55 03/10/2023     Lab Results   Component Value Date    CHOLHDL 22.6 01/17/2025    CHOLHDL 23.0 06/23/2023    CHOLHDL 24.3 03/10/2023     Lab Results   Component Value Date    TOTALCHOLEST 4.4 01/17/2025    TOTALCHOLEST 4.3 06/23/2023    TOTALCHOLEST 4.1 03/10/2023     Lab Results   Component Value Date    ALT 38 01/17/2025    AST 34 01/17/2025    ALKPHOS 63 01/17/2025    BILITOT 1.4 (H) 01/17/2025     ======================================================  The 10-year ASCVD risk score (Sisi MCKENNA, et al., 2019) is: 16.4%    Values used to calculate the score:      Age: 55 years      Sex: Male      Is Non- : Yes      Diabetic: No      Tobacco smoker: No      Systolic Blood  "Pressure: 158 mmHg      Is BP treated: Yes      HDL Cholesterol: 40 mg/dL      Total Cholesterol: 177 mg/dL           Current Assessment & Plan   Establish care with Cardiology continue atorvastatin.Counseled on hyperlipidemia disease course, healthy diet and increased need for exercise.  Please be advised of the risk of cardiovascular disease, increase stroke and heart attack risk with uncontrolled/untreated hyperlipidemia.     Patient voiced understanding and understood the treatment plan. All questions were answered.            Metabolic syndrome (Chronic)    Overview   See other problems.  Patient has history of prediabetes, hypertension, hyperlipidemia, BMI 38    Diabetes Management Status    Statin: Taking  ACE/ARB: Taking    Screening or Prevention Patient's value Goal Complete/Controlled?   HgA1C Testing and Control   Lab Results   Component Value Date    HGBA1C 5.8 (H) 01/17/2025      Annually/Less than 8% Yes   Lipid profile : 01/17/2025 Annually Yes   LDL control Lab Results   Component Value Date    LDLCALC 123.4 01/17/2025    Annually/Less than 100 mg/dl  No   Nephropathy screening No results found for: "LABMICR"  No results found for: "PROTEINUA"  No results found for: "UTPCR"   Annually No   Blood pressure BP Readings from Last 1 Encounters:   02/27/25 (!) 158/94    Less than 140/90 No   Dilated retinal exam Most Recent Eye Exam Date: Not Found Annually No   Foot exam   Most Recent Foot Exam Date: Not Found Annually No              Current Assessment & Plan   Monitoring risk factors.  See problems.          Other Visit Diagnoses         Need for vaccination                 Review of patient's allergies indicates:   Allergen Reactions    Ace inhibitors Other (See Comments)     cough    Amlodipine     Spironolactone      Current Outpatient Medications   Medication Instructions    albuterol (PROVENTIL/VENTOLIN HFA) 90 mcg/actuation inhaler 2 puffs, Inhalation, Every 6 hours PRN, PRN    aspirin (ECOTRIN) " "81 mg, Oral, Daily    atorvastatin (LIPITOR) 40 mg, Oral, Daily    chlorthalidone (HYGROTEN) 25 mg, Oral, Daily    losartan (COZAAR) 100 mg, Oral, Daily    nebivoloL (BYSTOLIC) 20 mg, Oral, Daily    tadalafiL (CIALIS) 20 mg, Oral, Daily      I have reviewed the PMH, social history, FamilyHx, surgical history, allergies and medications documented / confirmed by the patient at the time of this visit.  Review of Systems   Constitutional:  Negative for chills, fatigue, fever and unexpected weight change.   HENT:  Negative for ear pain and sore throat.    Eyes:  Negative for redness and visual disturbance.   Respiratory:  Negative for cough and shortness of breath.    Cardiovascular:  Negative for chest pain and palpitations.   Gastrointestinal:  Negative for nausea and vomiting.   Endocrine: Negative for cold intolerance and heat intolerance.   Genitourinary:  Negative for difficulty urinating and hematuria.        +ED   Musculoskeletal:  Negative for arthralgias and myalgias.   Skin:  Negative for rash and wound.   Allergic/Immunologic: Negative for environmental allergies and food allergies.   Neurological:  Negative for weakness and headaches.   Hematological:  Negative for adenopathy. Does not bruise/bleed easily.   Psychiatric/Behavioral:  Positive for sleep disturbance (chronic; works shift work/night shift + [employer]). The patient is not nervous/anxious.      Objective:   BP (!) 158/94   Pulse 65   Ht 5' 9" (1.753 m)   Wt 117.6 kg (259 lb 4.8 oz)   SpO2 95%   BMI 38.29 kg/m²   Physical Exam  Vitals and nursing note reviewed.   Constitutional:       General: He is not in acute distress.     Appearance: He is well-developed. He is obese. He is not diaphoretic.   HENT:      Head: Normocephalic and atraumatic.      Right Ear: Tympanic membrane, ear canal and external ear normal. There is no impacted cerumen.      Left Ear: Tympanic membrane, ear canal and external ear normal. There is no impacted cerumen.      Nose: " Nose normal. No rhinorrhea.      Mouth/Throat:      Comments: Narrow airway  Eyes:      Extraocular Movements: Extraocular movements intact.      Pupils: Pupils are equal, round, and reactive to light.   Neck:      Vascular: No carotid bruit.      Comments: Enlarged neck circumference  Cardiovascular:      Rate and Rhythm: Normal rate.      Pulses: Normal pulses.   Pulmonary:      Effort: Pulmonary effort is normal. No respiratory distress.      Breath sounds: Normal breath sounds.   Abdominal:      General: Bowel sounds are normal.      Palpations: Abdomen is soft.   Musculoskeletal:         General: Normal range of motion.      Cervical back: Normal range of motion and neck supple.   Lymphadenopathy:      Cervical: No cervical adenopathy.   Skin:     General: Skin is warm and dry.      Capillary Refill: Capillary refill takes less than 2 seconds.      Findings: No rash.   Neurological:      General: No focal deficit present.      Mental Status: He is alert and oriented to person, place, and time.      Cranial Nerves: No cranial nerve deficit.      Motor: No weakness.      Gait: Gait normal.   Psychiatric:         Attention and Perception: He is attentive.         Mood and Affect: Mood normal. Mood is not anxious or depressed. Affect is not labile, blunt, angry or inappropriate.         Speech: He is communicative. Speech is not rapid and pressured, delayed, slurred or tangential.         Behavior: Behavior normal. Behavior is not agitated, slowed, aggressive, withdrawn, hyperactive or combative.         Thought Content: Thought content normal. Thought content is not paranoid or delusional. Thought content does not include homicidal or suicidal ideation. Thought content does not include homicidal or suicidal plan.         Cognition and Memory: Memory is not impaired.         Judgment: Judgment normal. Judgment is not impulsive or inappropriate.       Physical Exam  Lungs were auscultated.  Heart sounds  normal.    Results      Assessment:     1. Essential hypertension    2. Encounter for long-term (current) use of medications    3. Metabolic syndrome    4. Hyperlipidemia LDL goal <100    5. Need for vaccination    6. Mild intermittent asthma without complication      MDM:   Moderate medical complexity.  Moderate risk.  Total time: 31 minutes.  This includes total time spent on the encounter, which includes face to face time and non-face to face time preparing to see the patient (eg, review of previous medical records, tests), Obtaining and/or reviewing separately obtained history, documenting clinical information in the electronic or other health record, independently interpreting results (not separately reported)/communicating results to the patient/family/caregiver, and/or care coordination (not separately reported).    I have Reviewed and summarized old records.  I have performed thorough medication reconciliation today and discussed risk and benefits of medications.  I have reviewed labs and discussed with patient.  All questions were answered.  I am requesting old records and will review them once they are available.  Cardiology  Visit today included increased complexity associated with the care of the episodic problem see above assessment addressed and managing the longitudinal care of the patient due to the serious and/or complex managed problem(s) see above.    I have signed for the following orders AND/OR meds.  Orders Placed This Encounter   Procedures    CBC Without Differential     Standing Status:   Future     Expected Date:   2/27/2025     Expiration Date:   4/28/2026    Comprehensive Metabolic Panel     Standing Status:   Future     Expected Date:   2/27/2025     Expiration Date:   4/28/2026    TSH     Standing Status:   Future     Expected Date:   2/27/2025     Expiration Date:   4/28/2026    Hemoglobin A1C     Standing Status:   Future     Expected Date:   2/27/2025     Expiration Date:   4/28/2026     Lipid Panel     Standing Status:   Future     Expected Date:   2/27/2025     Expiration Date:   4/28/2026     Medications Ordered This Encounter   Medications    chlorthalidone (HYGROTEN) 25 MG Tab     Sig: Take 1 tablet (25 mg total) by mouth once daily.     Dispense:  30 tablet     Refill:  11     .    DIPH,PERTUSS(ACEL),TET VAC(PF)(ADULT)(ADACEL)(TDaP)        Follow up in about 4 weeks (around 3/27/2025), or if symptoms worsen or fail to improve.  Future Appointments       Date Provider Specialty Appt Notes    2/28/2025  Cardiology susi    2/28/2025 Rubin Araya MD Cardiology Essential hypertension    4/8/2025  Lab lab          If no improvement in symptoms or symptoms worsen, advised to call/follow-up at clinic or go to ER. Patient voiced understanding and all questions/concerns were addressed.   DISCLAIMER: This note was compiled by using a speech recognition dictation system and therefore please be aware that typographical / speech recognition errors can and do occur.  Please contact me if you see any errors specifically.  Consent was obtained for [facility] recording system prior to the visit.    This note was generated with the assistance of ambient listening technology. Verbal consent was obtained by the patient and accompanying visitor(s) for the recording of patient appointment to facilitate this note. I attest to having reviewed and edited the generated note for accuracy, though some syntax or spelling errors may persist. Please contact the author of this note for any clarification.    Rubin Serrano M.D.       Office: [phone] [address] [place] [address]  FAX: [phone]

## (undated) DEVICE — CUSTOM PACK: Brand: UNBRANDED

## (undated) DEVICE — NEEDLE SUT L24MM MINI ROT CUF HALF CIR

## (undated) DEVICE — PADDING UNDERCAST W3INXL12FT RAYON POLY SYN NONADHESIVE

## (undated) DEVICE — PENCIL ES L3M BTTN SWCH HOLSTER W/ BLDE ELECTRD EDGE

## (undated) DEVICE — PADDING,UNDERCAST,COTTON, 3X4YD STERILE: Brand: MEDLINE

## (undated) DEVICE — SUTURE VCRL SZ 3-0 L27IN ABSRB UD L26MM SH 1/2 CIR J416H

## (undated) DEVICE — GLOVE SURG SZ 65 CRM LTX FREE POLYISOPRENE POLYMER BEAD ANTI

## (undated) DEVICE — STOCKINETTE COMPRESSION W9XL48IN IMPERVIOUS WITH PULL TAB

## (undated) DEVICE — SUTURE PERMA-HAND SZ 3-0 L144IN NONABSORBABLE BLK LIGAPAK LA54G

## (undated) DEVICE — E-Z CLEAN, NON-STICK, PTFE COATED, ELECTROSURGICAL BLADE ELECTRODE, MODIFIED EXTENDED INSULATION, 2.5 INCH (6.35 CM): Brand: MEGADYNE

## (undated) DEVICE — SPONGE,PEANUT,XRAY,ST,SM,3/8",5/CARD: Brand: MEDLINE INDUSTRIES, INC.

## (undated) DEVICE — GLOVE SURG SZ 75 CRM LTX FREE POLYISOPRENE POLYMER BEAD ANTI

## (undated) DEVICE — PACK,EENT,TURBAN DRAPE,PK II: Brand: MEDLINE

## (undated) DEVICE — SKIN MARKER,REGULAR TIP WITH RULER AND LABELS: Brand: DEVON

## (undated) DEVICE — BANDAGE COMPR W4INXL9FT EXSANG SGL LAYERED NO CLSR ESMARCH

## (undated) DEVICE — TOOL MR8-7BA40D MR8 7CM BALL DIAMOND 4MM: Brand: MIDAS REX MR8

## (undated) DEVICE — GLOVE SURG SZ 75 L12IN FNGR THK79MIL GRN LTX FREE

## (undated) DEVICE — BANDAGE,SELF ADHRNT,COFLEX,4"X5YD,STRL: Brand: COLABEL

## (undated) DEVICE — 3M™ STERI-STRIP™ COMPOUND BENZOIN TINCTURE 40 BAGS/CARTON 4 CARTONS/CASE C1544: Brand: 3M™ STERI-STRIP™

## (undated) DEVICE — GOWN,SIRUS,POLYRNF,BRTHSLV,XL,30/CS: Brand: MEDLINE

## (undated) DEVICE — SYRINGE 10CC LOSS OF RESISTANCE PLAS LS

## (undated) DEVICE — APPLICATOR MEDICATED 10.5 CC SOLUTION HI LT ORNG CHLORAPREP

## (undated) DEVICE — 20GX4.5 TUOHY EPIDURAL NEEDLE: Brand: MEDLINE

## (undated) DEVICE — MEDI-VAC NON-CONDUCTIVE SUCTION TUBING 6MM X 6.1M (20 FT.) L: Brand: CARDINAL HEALTH

## (undated) DEVICE — 3M™ STERI-STRIP™ REINFORCED ADHESIVE SKIN CLOSURES, R1541, 1/4 IN X 3 IN (6 MM X 75 MM), 3 STRIPS/ENVELOPE: Brand: 3M™ STERI-STRIP™

## (undated) DEVICE — SUTURE MCRYL SZ 4-0 L18IN ABSRB UD L19MM PS-2 3/8 CIR PRIM Y496G

## (undated) DEVICE — TOWEL,OR,DSP,ST,NATURAL,DLX,4/PK,20PK/CS: Brand: MEDLINE

## (undated) DEVICE — 1000 S-DRAPE TOWEL DRAPE 10/BX: Brand: STERI-DRAPE™

## (undated) DEVICE — SUTURE ETHLN SZ 9-0 L5IN NONABSORBABLE BLK BV130-3 L19MM 2819G

## (undated) DEVICE — ELECTRODE PT RET AD L9FT HI MOIST COND ADH HYDRGEL CORDED

## (undated) DEVICE — CARDINAL HEALTH VESSEL LOOPS MAXI RED: Brand: DEVON

## (undated) DEVICE — SPLINT CAST W5XL30IN GRN STRENGTH PLSTR OF PARIS FAST SET

## (undated) DEVICE — DRESSING PETRO W3XL8IN OIL EMUL N ADH GZ KNIT IMPREG CELOS

## (undated) DEVICE — SYRINGE,EAR/ULCER, 2 OZ, STERILE: Brand: MEDLINE

## (undated) DEVICE — Z DISCONTINUED BY MEDLINE USE 2711682 TRAY SKIN PREP DRY W/ PREM GLV

## (undated) DEVICE — CONVERTORS STOCKINETTE: Brand: CONVERTORS

## (undated) DEVICE — ZIMMER® STERILE DISPOSABLE TOURNIQUET CUFF WITH PLC, SINGLE PORT, SINGLE BLADDER, 18 IN. (46 CM)

## (undated) DEVICE — APPLICATOR MEDICATED 26 CC SOLUTION HI LT ORNG CHLORAPREP

## (undated) DEVICE — INTENDED FOR TISSUE SEPARATION, AND OTHER PROCEDURES THAT REQUIRE A SHARP SURGICAL BLADE TO PUNCTURE OR CUT.: Brand: BARD-PARKER ® CARBON RIB-BACK BLADES

## (undated) DEVICE — SURGICAL NERVE STIMULATOR/LOCATOR: Brand: CHECKPOINT HEAD & NECK

## (undated) DEVICE — BANDAGE ADH W1XL3IN NAT FAB WVN FLX DURABLE N ADH PD SEAL

## (undated) DEVICE — SPONGE GZ W4XL4IN COT 12 PLY TYP VII WVN C FLD DSGN STERILE

## (undated) DEVICE — 3M™ COBAN™ NL STERILE NON-LATEX SELF-ADHERENT WRAP, 2084S, 4 IN X 5 YD (10 CM X 4,5 M), 18 ROLLS/CASE: Brand: 3M™ COBAN™

## (undated) DEVICE — NEEDLE HYPO 18GA L1IN PNK POLYPR HUB S STL REG BVL STR W/O

## (undated) DEVICE — DRAPE,U/ SHT,SPLIT,PLAS,STERIL: Brand: MEDLINE

## (undated) DEVICE — PLATE ES AD W 9FT CRD 2

## (undated) DEVICE — NDLCTR: FOAM/MAG 40CT 64/CS: Brand: MEDICAL ACTION INDUSTRIES

## (undated) DEVICE — STANDARD HYPODERMIC NEEDLE,POLYPROPYLENE HUB: Brand: MONOJECT

## (undated) DEVICE — SOLUTION IV IRRIG 1000ML POUR BTL 2F7114

## (undated) DEVICE — DEVICE SIZING GRAFT FIX 0 1.3 MM KT TENODESIS SUTURETAPE W/ NDL

## (undated) DEVICE — ZIMMER® STERILE DISPOSABLE TOURNIQUET CUFF WITH PLC, SINGLE PORT, SINGLE BLADDER, 24 IN. (61 CM)

## (undated) DEVICE — PACKING 400354 20PK AMBRUS EAR: Brand: MEROCEL®

## (undated) DEVICE — GAUZE,SPONGE,4"X4",16PLY,XRAY,STRL,LF: Brand: MEDLINE

## (undated) DEVICE — APPLIER CLP L9.375IN APER 2.1MM CLS L3.8MM 20 SM TI CLP

## (undated) DEVICE — SOLUTION IRRIG 1000ML 0.9% SOD CHL USP POUR PLAS BTL

## (undated) DEVICE — MERCY TIFFIN HAND: Brand: MEDLINE INDUSTRIES, INC.

## (undated) DEVICE — TOWEL,OR,DSP,ST,BLUE,STD,4/PK,20PK/CS: Brand: MEDLINE

## (undated) DEVICE — SUTURE PERMAHAND SZ 0 L30IN NONABSORBABLE BLK SILK UNIDIR SA86G

## (undated) DEVICE — TIP SUCT FLNG TIP ON OFF CTRL YANK

## (undated) DEVICE — NERVE BLOCK TRAY: Brand: MEDLINE INDUSTRIES, INC.

## (undated) DEVICE — SYRINGE, LUER LOCK, 10ML: Brand: MEDLINE

## (undated) DEVICE — SOLUTION IV IRRIG 0.9% NACL 3000ML BAG 2B7477

## (undated) DEVICE — CORD,CAUTERY,BIPOLAR,STERILE: Brand: MEDLINE

## (undated) DEVICE — NEEDLE EPI 18GA L3.5IN CLR HUB TUOHY W/ WNG PERIFIX

## (undated) DEVICE — INTENDED TO AID IN THE PASSING OF SUTURES THROUGH BONE AND SOFT TISSUE DURING ORTHOPEDIC SURGERY: Brand: HOFFEE SUTURE RETRIEVER

## (undated) DEVICE — TOOL MR8-7BA60 MR8 7CM BALL 6MM: Brand: MIDAS REX MR8

## (undated) DEVICE — SUTURE FIBERWIRE SZ 2 W/ TAPERED NEEDLE BLUE L38IN NONABSORB BLU L26.5MM 1/2 CIRCLE AR7200

## (undated) DEVICE — DRAPE,INSTRUMENT,MAGNETIC,10X16: Brand: MEDLINE

## (undated) DEVICE — CLEANER ES TIP W2XL2IN ADH BK RADPQ FOR S STL ELECTRD

## (undated) DEVICE — SYRINGE MED 10ML LUERLOCK TIP W/O SFTY DISP

## (undated) DEVICE — PADDING,UNDERCAST,COTTON, 4"X4YD STERILE: Brand: MEDLINE

## (undated) DEVICE — SUTURE MCRYL SZ 5-0 L18IN ABSRB UD L19MM PS-2 3/8 CIR PRIM Y495G

## (undated) DEVICE — GLOVE ORANGE PI 7 1/2   MSG9075

## (undated) DEVICE — SUTURE PERMAHAND SZ 3-0 L30IN NONABSORBABLE BLK SH L26MM K832H

## (undated) DEVICE — GLOVE,SURG,SENSICARE,ALOE,LF,PF,6: Brand: MEDLINE

## (undated) DEVICE — SPONGE LAP W18XL18IN WHT COT 4 PLY FLD STRUNG RADPQ DISP ST 2 PER PACK

## (undated) DEVICE — PADDING CAST W6INXL4YD COT LO LINTING WYTEX

## (undated) DEVICE — Device

## (undated) DEVICE — SURGIFOAM SPNG SZ 100

## (undated) DEVICE — SOLUTION PREP 16OZ 10% POVIDONE IOD SCR CAP BTL

## (undated) DEVICE — SYRINGE EAR 2OZ ULC SLIMMER TIP FLAT BTM SUCT PWR DISP FOR

## (undated) DEVICE — STRIP,CLOSURE,WOUND,MEDI-STRIP,1/2X4: Brand: MEDLINE

## (undated) DEVICE — BANDAGE COMPR W3INXL5YD WHT BGE POLY COT M E WRP WV HK AND

## (undated) DEVICE — GOWN,SIRUS,POLYRNF,BRTHSLV,2XL,18/CS: Brand: MEDLINE

## (undated) DEVICE — SINGLE BASIN PLUS 3-LF: Brand: MEDLINE INDUSTRIES, INC.

## (undated) DEVICE — BANDAGE COMPR M W4INXL10YD WHT BGE VELC E MTRX HK AND LOOP

## (undated) DEVICE — BANDAGE COMPR W6INXL10YD ST M E WHITE/BEIGE

## (undated) DEVICE — SUTURE MCRYL SZ 3-0 L27IN ABSRB UD L24MM PS-1 3/8 CIR PRIM Y936H

## (undated) DEVICE — GLOVE SURG SZ 8 CRM LTX FREE POLYISOPRENE POLYMER BEAD ANTI

## (undated) DEVICE — PENCIL SMOKE EVAC PUSH BUTTON COATED

## (undated) DEVICE — SHOULDER ARTHROSCOPY PACK: Brand: MEDLINE INDUSTRIES, INC.

## (undated) DEVICE — 4-PORT MANIFOLD: Brand: NEPTUNE 2